# Patient Record
Sex: FEMALE | Race: WHITE | NOT HISPANIC OR LATINO
[De-identification: names, ages, dates, MRNs, and addresses within clinical notes are randomized per-mention and may not be internally consistent; named-entity substitution may affect disease eponyms.]

---

## 2017-02-13 ENCOUNTER — MEDICATION RENEWAL (OUTPATIENT)
Age: 82
End: 2017-02-13

## 2017-02-15 ENCOUNTER — APPOINTMENT (OUTPATIENT)
Dept: COLORECTAL SURGERY | Facility: CLINIC | Age: 82
End: 2017-02-15

## 2017-03-09 ENCOUNTER — OTHER (OUTPATIENT)
Age: 82
End: 2017-03-09

## 2017-03-09 ENCOUNTER — MEDICATION RENEWAL (OUTPATIENT)
Age: 82
End: 2017-03-09

## 2017-03-09 RX ORDER — LIDOCAINE 5 G/100G
5 OINTMENT TOPICAL
Qty: 1 | Refills: 3 | Status: ACTIVE | COMMUNITY
Start: 2017-03-09 | End: 1900-01-01

## 2017-04-05 ENCOUNTER — APPOINTMENT (OUTPATIENT)
Dept: INTERNAL MEDICINE | Facility: CLINIC | Age: 82
End: 2017-04-05

## 2017-04-05 VITALS
OXYGEN SATURATION: 97 % | BODY MASS INDEX: 23.39 KG/M2 | SYSTOLIC BLOOD PRESSURE: 160 MMHG | HEART RATE: 76 BPM | HEIGHT: 63 IN | DIASTOLIC BLOOD PRESSURE: 88 MMHG | WEIGHT: 132 LBS | RESPIRATION RATE: 14 BRPM

## 2017-04-05 LAB
INR PPP: 1.9 RATIO
POCT-PROTHROMBIN TIME: 22.4 SECS
QUALITY CONTROL: YES

## 2017-04-06 LAB
ALBUMIN SERPL ELPH-MCNC: 4.4 G/DL
ALP BLD-CCNC: 132 U/L
ALT SERPL-CCNC: 17 U/L
ANION GAP SERPL CALC-SCNC: 14 MMOL/L
AST SERPL-CCNC: 19 U/L
BASOPHILS # BLD AUTO: 0.03 K/UL
BASOPHILS NFR BLD AUTO: 0.3 %
BILIRUB SERPL-MCNC: 1.2 MG/DL
BUN SERPL-MCNC: 17 MG/DL
CALCIUM SERPL-MCNC: 9.2 MG/DL
CHLORIDE SERPL-SCNC: 102 MMOL/L
CHOLEST SERPL-MCNC: 123 MG/DL
CHOLEST/HDLC SERPL: 2.5 RATIO
CO2 SERPL-SCNC: 26 MMOL/L
CREAT SERPL-MCNC: 0.97 MG/DL
EOSINOPHIL # BLD AUTO: 0.02 K/UL
EOSINOPHIL NFR BLD AUTO: 0.2 %
FERRITIN SERPL-MCNC: 32.8 NG/ML
GLUCOSE SERPL-MCNC: 137 MG/DL
HBA1C MFR BLD HPLC: 6.5 %
HCT VFR BLD CALC: 51.9 %
HDLC SERPL-MCNC: 50 MG/DL
HGB BLD-MCNC: 15.7 G/DL
IMM GRANULOCYTES NFR BLD AUTO: 0.2 %
IRON SATN MFR SERPL: 6 %
IRON SERPL-MCNC: 42 UG/DL
LDLC SERPL CALC-MCNC: 59 MG/DL
LYMPHOCYTES # BLD AUTO: 1.25 K/UL
LYMPHOCYTES NFR BLD AUTO: 14 %
MAN DIFF?: NORMAL
MCHC RBC-ENTMCNC: 29 PG
MCHC RBC-ENTMCNC: 30.3 GM/DL
MCV RBC AUTO: 95.8 FL
MONOCYTES # BLD AUTO: 0.58 K/UL
MONOCYTES NFR BLD AUTO: 6.5 %
NEUTROPHILS # BLD AUTO: 7.01 K/UL
NEUTROPHILS NFR BLD AUTO: 78.8 %
PLATELET # BLD AUTO: 190 K/UL
POTASSIUM SERPL-SCNC: 4.9 MMOL/L
PROT SERPL-MCNC: 6.5 G/DL
RBC # BLD: 5.42 M/UL
RBC # FLD: 14.6 %
SODIUM SERPL-SCNC: 142 MMOL/L
TIBC SERPL-MCNC: 366 UG/DL
TRIGL SERPL-MCNC: 69 MG/DL
TSH SERPL-ACNC: 2.86 UIU/ML
UIBC SERPL-MCNC: 324 UG/DL
WBC # FLD AUTO: 8.91 K/UL

## 2017-04-11 ENCOUNTER — APPOINTMENT (OUTPATIENT)
Dept: COLORECTAL SURGERY | Facility: CLINIC | Age: 82
End: 2017-04-11

## 2017-04-11 VITALS
WEIGHT: 132 LBS | SYSTOLIC BLOOD PRESSURE: 162 MMHG | TEMPERATURE: 97.9 F | HEIGHT: 63 IN | BODY MASS INDEX: 23.39 KG/M2 | HEART RATE: 74 BPM | DIASTOLIC BLOOD PRESSURE: 84 MMHG

## 2017-04-11 RX ORDER — LIDOCAINE HYDROCHLORIDE 20 MG/ML
2 JELLY TOPICAL
Qty: 1 | Refills: 5 | Status: ACTIVE | COMMUNITY
Start: 2017-04-11 | End: 1900-01-01

## 2017-04-11 RX ORDER — HYDROCORTISONE 25 MG/G
2.5 CREAM TOPICAL
Qty: 1 | Refills: 5 | Status: ACTIVE | COMMUNITY
Start: 2017-04-11 | End: 1900-01-01

## 2017-04-14 LAB
VZV AB TITR SER: POSITIVE
VZV IGG SER IF-ACNC: 2166 INDEX

## 2017-04-15 ENCOUNTER — OTHER (OUTPATIENT)
Age: 82
End: 2017-04-15

## 2017-04-15 RX ORDER — NITROGLYCERIN 4 MG/G
0.4 OINTMENT RECTAL TWICE DAILY
Qty: 30 | Refills: 0 | Status: ACTIVE | COMMUNITY
Start: 2017-04-15 | End: 1900-01-01

## 2017-04-17 ENCOUNTER — MEDICATION RENEWAL (OUTPATIENT)
Age: 82
End: 2017-04-17

## 2017-05-12 ENCOUNTER — MEDICATION RENEWAL (OUTPATIENT)
Age: 82
End: 2017-05-12

## 2017-07-19 ENCOUNTER — APPOINTMENT (OUTPATIENT)
Dept: COLORECTAL SURGERY | Facility: CLINIC | Age: 82
End: 2017-07-19

## 2017-07-19 VITALS
HEIGHT: 63 IN | DIASTOLIC BLOOD PRESSURE: 64 MMHG | BODY MASS INDEX: 23.39 KG/M2 | WEIGHT: 132 LBS | SYSTOLIC BLOOD PRESSURE: 110 MMHG | TEMPERATURE: 98.1 F | HEART RATE: 72 BPM

## 2017-07-19 DIAGNOSIS — K64.8 OTHER HEMORRHOIDS: ICD-10-CM

## 2017-07-28 ENCOUNTER — APPOINTMENT (OUTPATIENT)
Dept: INTERNAL MEDICINE | Facility: CLINIC | Age: 82
End: 2017-07-28
Payer: MEDICARE

## 2017-07-28 VITALS
DIASTOLIC BLOOD PRESSURE: 72 MMHG | HEART RATE: 87 BPM | SYSTOLIC BLOOD PRESSURE: 118 MMHG | HEIGHT: 63 IN | BODY MASS INDEX: 23.57 KG/M2 | RESPIRATION RATE: 14 BRPM | WEIGHT: 133 LBS | TEMPERATURE: 98.2 F | OXYGEN SATURATION: 97 %

## 2017-07-28 LAB
INR PPP: 2.6 RATIO
POCT-PROTHROMBIN TIME: 30.7 SECS
QUALITY CONTROL: YES

## 2017-07-28 PROCEDURE — 99214 OFFICE O/P EST MOD 30 MIN: CPT

## 2017-07-31 ENCOUNTER — OUTPATIENT (OUTPATIENT)
Dept: OUTPATIENT SERVICES | Facility: HOSPITAL | Age: 82
LOS: 1 days | Discharge: HOME | End: 2017-07-31
Payer: MEDICARE

## 2017-07-31 VITALS
HEART RATE: 76 BPM | SYSTOLIC BLOOD PRESSURE: 170 MMHG | RESPIRATION RATE: 20 BRPM | WEIGHT: 132.06 LBS | HEIGHT: 63 IN | TEMPERATURE: 97 F | OXYGEN SATURATION: 99 % | DIASTOLIC BLOOD PRESSURE: 90 MMHG

## 2017-07-31 DIAGNOSIS — Z96.7 PRESENCE OF OTHER BONE AND TENDON IMPLANTS: Chronic | ICD-10-CM

## 2017-07-31 DIAGNOSIS — Z98.49 CATARACT EXTRACTION STATUS, UNSPECIFIED EYE: Chronic | ICD-10-CM

## 2017-07-31 DIAGNOSIS — K60.2 ANAL FISSURE, UNSPECIFIED: ICD-10-CM

## 2017-07-31 LAB
ABO RH CONFIRMATION: SIGNIFICANT CHANGE UP
ANION GAP SERPL CALC-SCNC: 6 MMOL/L — SIGNIFICANT CHANGE UP (ref 5–17)
BASOPHILS # BLD AUTO: 0.1 K/UL — SIGNIFICANT CHANGE UP (ref 0–0.2)
BASOPHILS NFR BLD AUTO: 1.1 % — SIGNIFICANT CHANGE UP (ref 0–2)
BLD GP AB SCN SERPL QL: SIGNIFICANT CHANGE UP
BUN SERPL-MCNC: 22 MG/DL — SIGNIFICANT CHANGE UP (ref 7–23)
CALCIUM SERPL-MCNC: 9.4 MG/DL — SIGNIFICANT CHANGE UP (ref 8.5–10.1)
CHLORIDE SERPL-SCNC: 104 MMOL/L — SIGNIFICANT CHANGE UP (ref 96–108)
CO2 SERPL-SCNC: 28 MMOL/L — SIGNIFICANT CHANGE UP (ref 22–31)
CREAT SERPL-MCNC: 1.13 MG/DL — SIGNIFICANT CHANGE UP (ref 0.5–1.3)
EOSINOPHIL # BLD AUTO: 0 K/UL — SIGNIFICANT CHANGE UP (ref 0–0.5)
EOSINOPHIL NFR BLD AUTO: 0.3 % — SIGNIFICANT CHANGE UP (ref 0–6)
GLUCOSE SERPL-MCNC: 305 MG/DL — HIGH (ref 70–99)
HCT VFR BLD CALC: 51.9 % — HIGH (ref 34.5–45)
HGB BLD-MCNC: 15.8 G/DL — HIGH (ref 11.5–15.5)
LYMPHOCYTES # BLD AUTO: 1 K/UL — SIGNIFICANT CHANGE UP (ref 1–3.3)
LYMPHOCYTES # BLD AUTO: 9.7 % — LOW (ref 13–44)
MCHC RBC-ENTMCNC: 27.3 PG — SIGNIFICANT CHANGE UP (ref 27–34)
MCHC RBC-ENTMCNC: 30.5 GM/DL — LOW (ref 32–36)
MCV RBC AUTO: 89.7 FL — SIGNIFICANT CHANGE UP (ref 80–100)
MONOCYTES # BLD AUTO: 0.7 K/UL — SIGNIFICANT CHANGE UP (ref 0–0.9)
MONOCYTES NFR BLD AUTO: 6.7 % — SIGNIFICANT CHANGE UP (ref 2–14)
NEUTROPHILS # BLD AUTO: 8.4 K/UL — HIGH (ref 1.8–7.4)
NEUTROPHILS NFR BLD AUTO: 82.2 % — HIGH (ref 43–77)
PLATELET # BLD AUTO: 210 K/UL — SIGNIFICANT CHANGE UP (ref 150–400)
POTASSIUM SERPL-MCNC: 4.4 MMOL/L — SIGNIFICANT CHANGE UP (ref 3.5–5.3)
POTASSIUM SERPL-SCNC: 4.4 MMOL/L — SIGNIFICANT CHANGE UP (ref 3.5–5.3)
RBC # BLD: 5.78 M/UL — HIGH (ref 3.8–5.2)
RBC # FLD: 16.2 % — HIGH (ref 10.3–14.5)
SODIUM SERPL-SCNC: 138 MMOL/L — SIGNIFICANT CHANGE UP (ref 135–145)
TYPE + AB SCN PNL BLD: SIGNIFICANT CHANGE UP
WBC # BLD: 10.3 K/UL — SIGNIFICANT CHANGE UP (ref 3.8–10.5)
WBC # FLD AUTO: 10.3 K/UL — SIGNIFICANT CHANGE UP (ref 3.8–10.5)

## 2017-07-31 PROCEDURE — 93010 ELECTROCARDIOGRAM REPORT: CPT

## 2017-07-31 NOTE — H&P PST ADULT - ASSESSMENT
88 y/o female with hemorrhoids, anal fissure. Scheduled for hemorrhoidectomy, fissurectomy, sphincterotomy, proctoplasty with Dr. Quesada.    Plan  1. Stop all NSAIDS, herbal supplements and vitamins for 7 days.  2. NPO at midnight.  3. Hold coumadin and aspirin as instructed by surgeon and PMD.

## 2017-07-31 NOTE — H&P PST ADULT - NSANTHOSAYNRD_GEN_A_CORE
No. BAO screening performed.  STOP BANG Legend: 0-2 = LOW Risk; 3-4 = INTERMEDIATE Risk; 5-8 = HIGH Risk

## 2017-07-31 NOTE — H&P PST ADULT - PMH
Atrial fibrillation    Elevated red blood cell count    Hearing loss    HTN (hypertension)    Hypercholesterolemia    Myocardial infarction  8 yrs ago  Skin cancer  of back - removed  Type 2 diabetes mellitus Anal fissure    Atrial fibrillation    Elevated red blood cell count    Hearing loss    HTN (hypertension)    Hypercholesterolemia    Myocardial infarction  8 yrs ago  Skin cancer  of back - removed  Type 2 diabetes mellitus

## 2017-07-31 NOTE — H&P PST ADULT - PSH
History of cataract surgery  03/2016,  06/2016  S/P ORIF (open reduction internal fixation) fracture  right hip pinning  - 5 yrs ago

## 2017-07-31 NOTE — H&P PST ADULT - HISTORY OF PRESENT ILLNESS
88 y/o female with hemorrhoids, anal fissure. Complain of rectal pain. Here today for PST for hemorrhoidectomy, fissurectomy, sphincterotomy, proctoplasty.

## 2017-07-31 NOTE — H&P PST ADULT - FAMILY HISTORY
Mother  Still living? No  Family history of cancer, Age at diagnosis: Age Unknown     Father  Still living? No  Family history of heart failure, Age at diagnosis: Age Unknown     Sibling  Still living? No  Family history of cancer, Age at diagnosis: Age Unknown

## 2017-08-03 ENCOUNTER — RESULT REVIEW (OUTPATIENT)
Age: 82
End: 2017-08-03

## 2017-08-03 ENCOUNTER — APPOINTMENT (OUTPATIENT)
Dept: COLORECTAL SURGERY | Facility: HOSPITAL | Age: 82
End: 2017-08-03
Payer: MEDICARE

## 2017-08-03 ENCOUNTER — OUTPATIENT (OUTPATIENT)
Dept: OUTPATIENT SERVICES | Facility: HOSPITAL | Age: 82
LOS: 1 days | Discharge: HOME | End: 2017-08-03
Payer: MEDICARE

## 2017-08-03 VITALS
HEART RATE: 97 BPM | SYSTOLIC BLOOD PRESSURE: 132 MMHG | RESPIRATION RATE: 18 BRPM | OXYGEN SATURATION: 98 % | DIASTOLIC BLOOD PRESSURE: 73 MMHG | TEMPERATURE: 98 F

## 2017-08-03 VITALS
TEMPERATURE: 97 F | HEART RATE: 89 BPM | SYSTOLIC BLOOD PRESSURE: 147 MMHG | OXYGEN SATURATION: 100 % | RESPIRATION RATE: 17 BRPM | HEIGHT: 63 IN | WEIGHT: 133.38 LBS | DIASTOLIC BLOOD PRESSURE: 68 MMHG

## 2017-08-03 DIAGNOSIS — Z96.7 PRESENCE OF OTHER BONE AND TENDON IMPLANTS: Chronic | ICD-10-CM

## 2017-08-03 DIAGNOSIS — Z98.49 CATARACT EXTRACTION STATUS, UNSPECIFIED EYE: Chronic | ICD-10-CM

## 2017-08-03 LAB
APTT BLD: 43 SEC — HIGH (ref 27.5–37.4)
GLUCOSE SERPL-MCNC: 154 MG/DL — HIGH (ref 70–99)
INR BLD: 1.29 RATIO — HIGH (ref 0.88–1.16)
PROTHROM AB SERPL-ACNC: 14 SEC — HIGH (ref 9.8–12.7)

## 2017-08-03 PROCEDURE — 45990 SURG DX EXAM ANORECTAL: CPT | Mod: 59

## 2017-08-03 PROCEDURE — 46260 REMOVE IN/EX HEM GROUPS 2+: CPT

## 2017-08-03 PROCEDURE — 88304 TISSUE EXAM BY PATHOLOGIST: CPT | Mod: 26

## 2017-08-03 PROCEDURE — 46200 REMOVAL OF ANAL FISSURE: CPT | Mod: 59

## 2017-08-03 RX ORDER — OXYCODONE HYDROCHLORIDE 5 MG/1
1 TABLET ORAL
Qty: 20 | Refills: 0
Start: 2017-08-03

## 2017-08-03 RX ORDER — ONDANSETRON 8 MG/1
4 TABLET, FILM COATED ORAL ONCE
Qty: 0 | Refills: 0 | Status: DISCONTINUED | OUTPATIENT
Start: 2017-08-03 | End: 2017-08-03

## 2017-08-03 RX ORDER — FENTANYL CITRATE 50 UG/ML
50 INJECTION INTRAVENOUS
Qty: 0 | Refills: 0 | Status: DISCONTINUED | OUTPATIENT
Start: 2017-08-03 | End: 2017-08-03

## 2017-08-03 RX ORDER — OXYCODONE HYDROCHLORIDE 5 MG/1
10 TABLET ORAL EVERY 6 HOURS
Qty: 0 | Refills: 0 | Status: DISCONTINUED | OUTPATIENT
Start: 2017-08-03 | End: 2017-08-03

## 2017-08-03 RX ORDER — HYDRALAZINE HCL 50 MG
5 TABLET ORAL
Qty: 0 | Refills: 0 | Status: DISCONTINUED | OUTPATIENT
Start: 2017-08-03 | End: 2017-08-18

## 2017-08-03 RX ORDER — SODIUM CHLORIDE 9 MG/ML
1000 INJECTION INTRAMUSCULAR; INTRAVENOUS; SUBCUTANEOUS
Qty: 0 | Refills: 0 | Status: DISCONTINUED | OUTPATIENT
Start: 2017-08-03 | End: 2017-08-18

## 2017-08-03 RX ORDER — SODIUM CHLORIDE 9 MG/ML
3 INJECTION INTRAMUSCULAR; INTRAVENOUS; SUBCUTANEOUS EVERY 8 HOURS
Qty: 0 | Refills: 0 | Status: DISCONTINUED | OUTPATIENT
Start: 2017-08-03 | End: 2017-08-03

## 2017-08-03 RX ADMIN — SODIUM CHLORIDE 75 MILLILITER(S): 9 INJECTION INTRAMUSCULAR; INTRAVENOUS; SUBCUTANEOUS at 11:51

## 2017-08-03 RX ADMIN — Medication 5 MILLIGRAM(S): at 11:12

## 2017-08-03 RX ADMIN — Medication 5 MILLIGRAM(S): at 11:01

## 2017-08-03 NOTE — ASU DISCHARGE PLAN (ADULT/PEDIATRIC). - MEDICATION SUMMARY - MEDICATIONS TO TAKE
I will START or STAY ON the medications listed below when I get home from the hospital:    metamucil 1 teaspoon  --   once a day  -- Indication: For home med    aspirin 81 mg oral tablet  -- 1 tab(s) by mouth once a day    hold/last dose 07/27/2017  -- Indication: For home med    acetaminophen-oxycodone 325 mg-5 mg oral tablet  -- 1 tab(s) by mouth every 6 hours, As Needed -for moderate pain [id number]  -- Caution federal law prohibits the transfer of this drug to any person other  than the person for whom it was prescribed.  May cause drowsiness.  Alcohol may intensify this effect.  Use care when operating dangerous machinery.  This prescription cannot be refilled.  This product contains acetaminophen.  Do not use  with any other product containing acetaminophen to prevent possible liver damage.  Using more of this medication than prescribed may cause serious breathing problems.    -- Indication: For pain    ramipril 5 mg oral capsule  -- 1 cap(s) by mouth once a day  -- Indication: For home med    warfarin 2 mg oral tablet  -- 1 tab(s) by mouth once a day    Mon - Wed - Fri    last dose 07/27/2017  -- Indication: For home med    warfarin 3 mg oral tablet  -- 1 tab(s) by mouth 4 times a week    Tues- Thurs- Sat  - Sun    last dose 07/27/2017  -- Indication: For home med    glimepiride 1 mg oral tablet  -- 1 tab(s) by mouth once a day  -- Indication: For home med    atorvastatin 40 mg oral tablet  -- 1 tab(s) by mouth once a day  -- Indication: For home med    hydroxyurea 500 mg oral capsule  --  by mouth once a day  -- Indication: For home med    Colace 100 mg oral capsule  -- 1 cap(s) by mouth once a day  -- Indication: For home med    lansoprazole 30 mg oral delayed release capsule  -- 1 cap(s) by mouth once a day  -- Indication: For home med    Vitamin D3 1000 intl units oral capsule  -- 1 cap(s) by mouth once a day  -- Indication: For home med

## 2017-08-03 NOTE — ASU PATIENT PROFILE, ADULT - PMH
Anal fissure    Atrial fibrillation    Elevated red blood cell count    Hearing loss    HTN (hypertension)    Hypercholesterolemia    Myocardial infarction  8 yrs ago  Skin cancer  of back - removed  Type 2 diabetes mellitus

## 2017-08-03 NOTE — BRIEF OPERATIVE NOTE - PROCEDURE
Hemorrhoidectomy, in prone position  08/03/2017    Active  MBERRONESJR  Fissurectomy, anal, including sphincterotomy  08/03/2017    Active  MBERRONESJR

## 2017-08-03 NOTE — CHART NOTE - NSCHARTNOTEFT_GEN_A_CORE
Patient seen and evaluated in PACU for rectal bleeding following an EUA, LIS, fissurectomy, hemorrhoidectomy.  Nurses in PACU called about rectal bleeding  VS: 161/70 86 99%  NAD  rectal exam revealed bleeding at sphincterotomy site.  a vicryl suture applied and bleeding stopped, gauze applied.  Pt stable, will observe in PACU. if stable dc home today.    Pt seen and examined at bedside and procedure performed with Dr. Quesada

## 2017-08-03 NOTE — BRIEF OPERATIVE NOTE - POST-OP DX
Anal fissure  08/03/2017    Active  Gildardo Nicole  Anal pain  08/03/2017    Active  Gildardo Nicole  Hemorrhoids, unspecified hemorrhoid type  08/03/2017    Active  Gildardo Nicole

## 2017-08-03 NOTE — ASU DISCHARGE PLAN (ADULT/PEDIATRIC). - NOTIFY
Bleeding that does not stop/Fever greater than 101/Pain not relieved by Medications/Persistent Nausea and Vomiting/Excessive Diarrhea

## 2017-08-03 NOTE — ASU DISCHARGE PLAN (ADULT/PEDIATRIC). - SPECIAL INSTRUCTIONS
May apply ice packs to area. RESUME COUMADIN ON 8/6/17. PLEASE TAKE MILK OF MAGNESIA AND COLACE FOR CONSTIPATION.

## 2017-08-04 LAB — SURGICAL PATHOLOGY FINAL REPORT - CH: SIGNIFICANT CHANGE UP

## 2017-08-09 DIAGNOSIS — E11.9 TYPE 2 DIABETES MELLITUS WITHOUT COMPLICATIONS: ICD-10-CM

## 2017-08-09 DIAGNOSIS — I48.91 UNSPECIFIED ATRIAL FIBRILLATION: ICD-10-CM

## 2017-08-09 DIAGNOSIS — I10 ESSENTIAL (PRIMARY) HYPERTENSION: ICD-10-CM

## 2017-08-09 DIAGNOSIS — K60.2 ANAL FISSURE, UNSPECIFIED: ICD-10-CM

## 2017-08-09 DIAGNOSIS — I25.2 OLD MYOCARDIAL INFARCTION: ICD-10-CM

## 2017-08-09 DIAGNOSIS — K64.8 OTHER HEMORRHOIDS: ICD-10-CM

## 2017-08-09 DIAGNOSIS — Z79.01 LONG TERM (CURRENT) USE OF ANTICOAGULANTS: ICD-10-CM

## 2017-08-09 DIAGNOSIS — Z79.82 LONG TERM (CURRENT) USE OF ASPIRIN: ICD-10-CM

## 2017-08-09 DIAGNOSIS — J44.9 CHRONIC OBSTRUCTIVE PULMONARY DISEASE, UNSPECIFIED: ICD-10-CM

## 2017-08-09 DIAGNOSIS — I25.10 ATHEROSCLEROTIC HEART DISEASE OF NATIVE CORONARY ARTERY WITHOUT ANGINA PECTORIS: ICD-10-CM

## 2017-08-14 ENCOUNTER — RX RENEWAL (OUTPATIENT)
Age: 82
End: 2017-08-14

## 2017-08-21 ENCOUNTER — OTHER (OUTPATIENT)
Age: 82
End: 2017-08-21

## 2017-09-05 ENCOUNTER — RX RENEWAL (OUTPATIENT)
Age: 82
End: 2017-09-05

## 2017-09-06 ENCOUNTER — APPOINTMENT (OUTPATIENT)
Dept: COLORECTAL SURGERY | Facility: CLINIC | Age: 82
End: 2017-09-06
Payer: MEDICARE

## 2017-09-06 VITALS
HEIGHT: 63 IN | WEIGHT: 134 LBS | TEMPERATURE: 98.1 F | SYSTOLIC BLOOD PRESSURE: 124 MMHG | BODY MASS INDEX: 23.74 KG/M2 | DIASTOLIC BLOOD PRESSURE: 74 MMHG | HEART RATE: 84 BPM

## 2017-09-06 DIAGNOSIS — K64.4 RESIDUAL HEMORRHOIDAL SKIN TAGS: ICD-10-CM

## 2017-09-06 PROCEDURE — 99024 POSTOP FOLLOW-UP VISIT: CPT

## 2017-09-07 ENCOUNTER — MEDICATION RENEWAL (OUTPATIENT)
Age: 82
End: 2017-09-07

## 2017-09-12 ENCOUNTER — RX RENEWAL (OUTPATIENT)
Age: 82
End: 2017-09-12

## 2017-09-20 ENCOUNTER — APPOINTMENT (OUTPATIENT)
Dept: CARDIOLOGY | Facility: CLINIC | Age: 82
End: 2017-09-20
Payer: MEDICARE

## 2017-09-20 ENCOUNTER — NON-APPOINTMENT (OUTPATIENT)
Age: 82
End: 2017-09-20

## 2017-09-20 VITALS
BODY MASS INDEX: 23.39 KG/M2 | OXYGEN SATURATION: 95 % | SYSTOLIC BLOOD PRESSURE: 178 MMHG | DIASTOLIC BLOOD PRESSURE: 82 MMHG | HEIGHT: 63 IN | WEIGHT: 132 LBS | HEART RATE: 80 BPM

## 2017-09-20 VITALS — DIASTOLIC BLOOD PRESSURE: 80 MMHG | SYSTOLIC BLOOD PRESSURE: 140 MMHG

## 2017-09-20 LAB
INR PPP: 2 RATIO
QUALITY CONTROL: YES

## 2017-09-20 PROCEDURE — 93000 ELECTROCARDIOGRAM COMPLETE: CPT

## 2017-09-20 PROCEDURE — 85610 PROTHROMBIN TIME: CPT | Mod: QW

## 2017-09-20 PROCEDURE — 99215 OFFICE O/P EST HI 40 MIN: CPT

## 2017-10-03 VITALS — BODY MASS INDEX: 23.39 KG/M2 | WEIGHT: 132 LBS | HEIGHT: 63 IN | HEART RATE: 80 BPM

## 2017-10-04 ENCOUNTER — APPOINTMENT (OUTPATIENT)
Dept: COLORECTAL SURGERY | Facility: CLINIC | Age: 82
End: 2017-10-04
Payer: MEDICARE

## 2017-10-04 DIAGNOSIS — K60.2 ANAL FISSURE, UNSPECIFIED: ICD-10-CM

## 2017-10-04 PROCEDURE — 99024 POSTOP FOLLOW-UP VISIT: CPT

## 2017-10-05 PROBLEM — K60.2 ANAL FISSURE: Status: ACTIVE | Noted: 2017-04-14

## 2017-11-10 ENCOUNTER — RX RENEWAL (OUTPATIENT)
Age: 82
End: 2017-11-10

## 2017-12-27 ENCOUNTER — APPOINTMENT (OUTPATIENT)
Dept: INTERNAL MEDICINE | Facility: CLINIC | Age: 82
End: 2017-12-27
Payer: MEDICARE

## 2017-12-27 VITALS
BODY MASS INDEX: 23.57 KG/M2 | DIASTOLIC BLOOD PRESSURE: 64 MMHG | TEMPERATURE: 97.6 F | HEART RATE: 98 BPM | HEIGHT: 63 IN | RESPIRATION RATE: 14 BRPM | OXYGEN SATURATION: 97 % | SYSTOLIC BLOOD PRESSURE: 120 MMHG | WEIGHT: 133 LBS

## 2017-12-27 PROCEDURE — 99214 OFFICE O/P EST MOD 30 MIN: CPT | Mod: 25

## 2017-12-27 PROCEDURE — 90686 IIV4 VACC NO PRSV 0.5 ML IM: CPT

## 2017-12-27 PROCEDURE — G0008: CPT

## 2018-01-05 ENCOUNTER — APPOINTMENT (OUTPATIENT)
Dept: COLORECTAL SURGERY | Facility: CLINIC | Age: 83
End: 2018-01-05
Payer: MEDICARE

## 2018-01-05 VITALS
DIASTOLIC BLOOD PRESSURE: 78 MMHG | WEIGHT: 133 LBS | HEIGHT: 63 IN | TEMPERATURE: 98.1 F | SYSTOLIC BLOOD PRESSURE: 132 MMHG | BODY MASS INDEX: 23.57 KG/M2 | HEART RATE: 96 BPM

## 2018-01-05 DIAGNOSIS — K59.09 OTHER CONSTIPATION: ICD-10-CM

## 2018-01-05 PROCEDURE — 99214 OFFICE O/P EST MOD 30 MIN: CPT | Mod: 25

## 2018-01-05 PROCEDURE — 46600 DIAGNOSTIC ANOSCOPY SPX: CPT

## 2018-01-08 ENCOUNTER — MEDICATION RENEWAL (OUTPATIENT)
Age: 83
End: 2018-01-08

## 2018-01-08 LAB
INR PPP: 2.5 RATIO
POCT-PROTHROMBIN TIME: 30.1 SECS
QUALITY CONTROL: YES

## 2018-01-09 ENCOUNTER — RX RENEWAL (OUTPATIENT)
Age: 83
End: 2018-01-09

## 2018-02-07 ENCOUNTER — RX RENEWAL (OUTPATIENT)
Age: 83
End: 2018-02-07

## 2018-05-01 ENCOUNTER — RX RENEWAL (OUTPATIENT)
Age: 83
End: 2018-05-01

## 2018-05-06 ENCOUNTER — RX RENEWAL (OUTPATIENT)
Age: 83
End: 2018-05-06

## 2018-05-21 ENCOUNTER — RX RENEWAL (OUTPATIENT)
Age: 83
End: 2018-05-21

## 2018-06-18 ENCOUNTER — APPOINTMENT (OUTPATIENT)
Dept: INTERNAL MEDICINE | Facility: CLINIC | Age: 83
End: 2018-06-18
Payer: MEDICARE

## 2018-06-18 VITALS
HEART RATE: 84 BPM | RESPIRATION RATE: 14 BRPM | TEMPERATURE: 98.3 F | HEIGHT: 63 IN | SYSTOLIC BLOOD PRESSURE: 144 MMHG | DIASTOLIC BLOOD PRESSURE: 72 MMHG | WEIGHT: 133 LBS | BODY MASS INDEX: 23.57 KG/M2 | OXYGEN SATURATION: 98 %

## 2018-06-18 PROCEDURE — 99214 OFFICE O/P EST MOD 30 MIN: CPT

## 2018-06-18 NOTE — HISTORY OF PRESENT ILLNESS
[Other: _____] : [unfilled] [FreeTextEntry1] : left eye pain [de-identified] : Pt c/o waking up with pain around left eye with redness. No blurred vision.\par \par Her anal area is much better since most recent procedure for her hemorrhoids.

## 2018-06-18 NOTE — PHYSICAL EXAM
[No Acute Distress] : no acute distress [Well Nourished] : well nourished [Well Developed] : well developed [Normal Sclera/Conjunctiva] : normal sclera/conjunctiva [PERRL] : pupils equal round and reactive to light [EOMI] : extraocular movements intact [No Respiratory Distress] : no respiratory distress  [Clear to Auscultation] : lungs were clear to auscultation bilaterally [Normal Rate] : normal rate  [Regular Rhythm] : with a regular rhythm [No Edema] : there was no peripheral edema [Soft] : abdomen soft [Non Tender] : non-tender [Normal Gait] : normal gait [Coordination Grossly Intact] : coordination grossly intact [Normal Affect] : the affect was normal [Normal Insight/Judgement] : insight and judgment were intact [de-identified] : erythema under left eye with soft edema upper cheek

## 2018-06-26 LAB
INR PPP: 2.6 RATIO
POCT-PROTHROMBIN TIME: 31.2 SECS
QUALITY CONTROL: YES

## 2018-07-16 ENCOUNTER — RX RENEWAL (OUTPATIENT)
Age: 83
End: 2018-07-16

## 2018-08-14 ENCOUNTER — RX RENEWAL (OUTPATIENT)
Age: 83
End: 2018-08-14

## 2018-08-28 PROBLEM — I21.3 ST ELEVATION (STEMI) MYOCARDIAL INFARCTION OF UNSPECIFIED SITE: Chronic | Status: ACTIVE | Noted: 2017-07-31

## 2018-08-28 PROBLEM — H91.90 UNSPECIFIED HEARING LOSS, UNSPECIFIED EAR: Chronic | Status: ACTIVE | Noted: 2017-07-31

## 2018-08-28 PROBLEM — R71.8 OTHER ABNORMALITY OF RED BLOOD CELLS: Chronic | Status: ACTIVE | Noted: 2017-07-31

## 2018-08-28 PROBLEM — C44.90 UNSPECIFIED MALIGNANT NEOPLASM OF SKIN, UNSPECIFIED: Chronic | Status: ACTIVE | Noted: 2017-07-31

## 2018-08-28 PROBLEM — I10 ESSENTIAL (PRIMARY) HYPERTENSION: Chronic | Status: ACTIVE | Noted: 2017-07-31

## 2018-08-28 PROBLEM — E78.00 PURE HYPERCHOLESTEROLEMIA, UNSPECIFIED: Chronic | Status: ACTIVE | Noted: 2017-07-31

## 2018-08-28 PROBLEM — K60.2 ANAL FISSURE, UNSPECIFIED: Chronic | Status: ACTIVE | Noted: 2017-07-31

## 2018-08-30 ENCOUNTER — APPOINTMENT (OUTPATIENT)
Dept: INTERNAL MEDICINE | Facility: CLINIC | Age: 83
End: 2018-08-30
Payer: MEDICARE

## 2018-08-30 ENCOUNTER — RX RENEWAL (OUTPATIENT)
Age: 83
End: 2018-08-30

## 2018-08-30 VITALS
SYSTOLIC BLOOD PRESSURE: 130 MMHG | BODY MASS INDEX: 22.68 KG/M2 | TEMPERATURE: 98.1 F | HEIGHT: 63 IN | HEART RATE: 88 BPM | RESPIRATION RATE: 14 BRPM | OXYGEN SATURATION: 98 % | WEIGHT: 128 LBS | DIASTOLIC BLOOD PRESSURE: 64 MMHG

## 2018-08-30 DIAGNOSIS — H00.011 HORDEOLUM EXTERNUM RIGHT UPPER EYELID: ICD-10-CM

## 2018-08-30 PROCEDURE — 99214 OFFICE O/P EST MOD 30 MIN: CPT

## 2018-08-30 NOTE — HISTORY OF PRESENT ILLNESS
[FreeTextEntry8] : CC: eyelid swelling\par \par c/o right upper lid with edema. no pain or itch. has been like this for one week. \par \par Had it on left and went away with antibiotics. \par \par Has DM and glucose has been high.\par \par c/o fatigue.  [Other: _____] : [unfilled]

## 2018-08-30 NOTE — PHYSICAL EXAM
[No Acute Distress] : no acute distress [Well Nourished] : well nourished [Well Developed] : well developed [Well-Appearing] : well-appearing [Normal Sclera/Conjunctiva] : normal sclera/conjunctiva [PERRL] : pupils equal round and reactive to light [EOMI] : extraocular movements intact [No Respiratory Distress] : no respiratory distress  [Clear to Auscultation] : lungs were clear to auscultation bilaterally [Normal Rate] : normal rate  [Regular Rhythm] : with a regular rhythm [No Edema] : there was no peripheral edema [Soft] : abdomen soft [Non Tender] : non-tender [Normal Affect] : the affect was normal [Normal Insight/Judgement] : insight and judgment were intact [de-identified] : right upper lid with edema, nontender, erythema of lid.

## 2018-09-07 ENCOUNTER — RX RENEWAL (OUTPATIENT)
Age: 83
End: 2018-09-07

## 2018-09-12 LAB
INR PPP: 4.3 RATIO
POCT-PROTHROMBIN TIME: 51 SECS
QUALITY CONTROL: YES

## 2018-09-26 ENCOUNTER — RX RENEWAL (OUTPATIENT)
Age: 83
End: 2018-09-26

## 2018-10-24 ENCOUNTER — RX RENEWAL (OUTPATIENT)
Age: 83
End: 2018-10-24

## 2018-10-24 ENCOUNTER — MEDICATION RENEWAL (OUTPATIENT)
Age: 83
End: 2018-10-24

## 2018-10-25 NOTE — ASU PATIENT PROFILE, ADULT - NS PRO AD PATIENT TYPE ON CHART
Patient has abnormal cell on her pap smear that are not cancerous and likely related to her being post-menopausal and having a vaginal area that is just a little bit drier than is used to be. Dr. Fierro tested for virus called HPV that can cause cervical cancer and this was negative. I would not worry about this, but should repeat pap in one year or sooner if bleeding, pelvic pain or other issues. Health Care Proxy (HCP)

## 2018-10-29 ENCOUNTER — APPOINTMENT (OUTPATIENT)
Dept: INTERNAL MEDICINE | Facility: CLINIC | Age: 83
End: 2018-10-29
Payer: MEDICARE

## 2018-10-29 ENCOUNTER — MEDICATION RENEWAL (OUTPATIENT)
Age: 83
End: 2018-10-29

## 2018-10-29 VITALS
TEMPERATURE: 97.4 F | SYSTOLIC BLOOD PRESSURE: 130 MMHG | DIASTOLIC BLOOD PRESSURE: 70 MMHG | RESPIRATION RATE: 14 BRPM | HEIGHT: 63 IN | OXYGEN SATURATION: 95 % | HEART RATE: 78 BPM | BODY MASS INDEX: 22.68 KG/M2 | WEIGHT: 128 LBS

## 2018-10-29 LAB
INR PPP: 3.2 RATIO
POCT-PROTHROMBIN TIME: 39 SECS
QUALITY CONTROL: YES

## 2018-10-29 PROCEDURE — 99214 OFFICE O/P EST MOD 30 MIN: CPT

## 2018-10-29 NOTE — REVIEW OF SYSTEMS
[Fatigue] : fatigue [Chest Pain] : chest pain [Palpitations] : palpitations [Shortness Of Breath] : shortness of breath [Cough] : cough [Dyspnea on Exertion] : dyspnea on exertion [Negative] : Heme/Lymph

## 2018-10-29 NOTE — PHYSICAL EXAM
[Well Nourished] : well nourished [Well Developed] : well developed [Normal TMs] : both tympanic membranes were normal [No Respiratory Distress] : no respiratory distress  [Clear to Auscultation] : lungs were clear to auscultation bilaterally [Normal Rate] : normal rate  [Regular Rhythm] : with a regular rhythm [No Edema] : there was no peripheral edema [Soft] : abdomen soft [Non Tender] : non-tender [Normal Affect] : the affect was normal [Normal Insight/Judgement] : insight and judgment were intact [de-identified] : mild dyspnea

## 2018-10-29 NOTE — HISTORY OF PRESENT ILLNESS
[Other: _____] : [unfilled] [FreeTextEntry1] : sob [de-identified] : Pt c/o trouble breathing on and off for a week or so. Sometimes she is fine. Does not sleep well, but not new. She does fall asleep during the day. Always sleeps with 3 pillows. No pain. Coughs which is not new. Has a lot of mucus in her throat.

## 2018-10-30 LAB
ALBUMIN SERPL ELPH-MCNC: 4.4 G/DL
ALP BLD-CCNC: 133 U/L
ALT SERPL-CCNC: 7 U/L
ANION GAP SERPL CALC-SCNC: 14 MMOL/L
AST SERPL-CCNC: 17 U/L
BILIRUB SERPL-MCNC: 0.8 MG/DL
BUN SERPL-MCNC: 17 MG/DL
CALCIUM SERPL-MCNC: 9.7 MG/DL
CHLORIDE SERPL-SCNC: 103 MMOL/L
CHOLEST SERPL-MCNC: 101 MG/DL
CHOLEST/HDLC SERPL: 3.2 RATIO
CO2 SERPL-SCNC: 23 MMOL/L
CREAT SERPL-MCNC: 0.9 MG/DL
CREAT SPEC-SCNC: 120 MG/DL
GLUCOSE SERPL-MCNC: 256 MG/DL
HBA1C MFR BLD HPLC: 7.9 %
HDLC SERPL-MCNC: 32 MG/DL
LDLC SERPL CALC-MCNC: 52 MG/DL
MICROALBUMIN 24H UR DL<=1MG/L-MCNC: 16.2 MG/DL
MICROALBUMIN/CREAT 24H UR-RTO: 135 MG/G
POTASSIUM SERPL-SCNC: 4.9 MMOL/L
PROT SERPL-MCNC: 6.8 G/DL
SODIUM SERPL-SCNC: 140 MMOL/L
TRIGL SERPL-MCNC: 85 MG/DL

## 2018-11-20 ENCOUNTER — FORM ENCOUNTER (OUTPATIENT)
Age: 83
End: 2018-11-20

## 2018-11-21 ENCOUNTER — APPOINTMENT (OUTPATIENT)
Dept: RADIOLOGY | Facility: CLINIC | Age: 83
End: 2018-11-21

## 2018-11-21 ENCOUNTER — OUTPATIENT (OUTPATIENT)
Dept: OUTPATIENT SERVICES | Facility: HOSPITAL | Age: 83
LOS: 1 days | End: 2018-11-21
Payer: COMMERCIAL

## 2018-11-21 DIAGNOSIS — Z98.49 CATARACT EXTRACTION STATUS, UNSPECIFIED EYE: Chronic | ICD-10-CM

## 2018-11-21 DIAGNOSIS — Z00.8 ENCOUNTER FOR OTHER GENERAL EXAMINATION: ICD-10-CM

## 2018-11-21 DIAGNOSIS — Z96.7 PRESENCE OF OTHER BONE AND TENDON IMPLANTS: Chronic | ICD-10-CM

## 2018-11-21 PROCEDURE — 71046 X-RAY EXAM CHEST 2 VIEWS: CPT | Mod: 26

## 2018-11-21 PROCEDURE — 71046 X-RAY EXAM CHEST 2 VIEWS: CPT

## 2018-11-29 ENCOUNTER — RX RENEWAL (OUTPATIENT)
Age: 83
End: 2018-11-29

## 2019-01-21 ENCOUNTER — RX RENEWAL (OUTPATIENT)
Age: 84
End: 2019-01-21

## 2019-04-13 ENCOUNTER — RX RENEWAL (OUTPATIENT)
Age: 84
End: 2019-04-13

## 2019-04-25 ENCOUNTER — FORM ENCOUNTER (OUTPATIENT)
Age: 84
End: 2019-04-25

## 2019-04-26 ENCOUNTER — OUTPATIENT (OUTPATIENT)
Dept: OUTPATIENT SERVICES | Facility: HOSPITAL | Age: 84
LOS: 1 days | End: 2019-04-26
Payer: COMMERCIAL

## 2019-04-26 ENCOUNTER — APPOINTMENT (OUTPATIENT)
Dept: RADIOLOGY | Facility: CLINIC | Age: 84
End: 2019-04-26
Payer: MEDICARE

## 2019-04-26 ENCOUNTER — APPOINTMENT (OUTPATIENT)
Dept: INTERNAL MEDICINE | Facility: CLINIC | Age: 84
End: 2019-04-26
Payer: MEDICARE

## 2019-04-26 VITALS
WEIGHT: 125 LBS | OXYGEN SATURATION: 90 % | HEIGHT: 63 IN | RESPIRATION RATE: 14 BRPM | BODY MASS INDEX: 22.15 KG/M2 | DIASTOLIC BLOOD PRESSURE: 78 MMHG | HEART RATE: 93 BPM | SYSTOLIC BLOOD PRESSURE: 140 MMHG | TEMPERATURE: 97.9 F

## 2019-04-26 VITALS — OXYGEN SATURATION: 95 %

## 2019-04-26 DIAGNOSIS — Z96.7 PRESENCE OF OTHER BONE AND TENDON IMPLANTS: Chronic | ICD-10-CM

## 2019-04-26 DIAGNOSIS — Z98.49 CATARACT EXTRACTION STATUS, UNSPECIFIED EYE: Chronic | ICD-10-CM

## 2019-04-26 DIAGNOSIS — Z00.8 ENCOUNTER FOR OTHER GENERAL EXAMINATION: ICD-10-CM

## 2019-04-26 LAB
INR PPP: 33.4 RATIO
POCT-PROTHROMBIN TIME: 2.8 SECS
QUALITY CONTROL: YES

## 2019-04-26 PROCEDURE — 85610 PROTHROMBIN TIME: CPT | Mod: QW

## 2019-04-26 PROCEDURE — 71046 X-RAY EXAM CHEST 2 VIEWS: CPT | Mod: 26

## 2019-04-26 PROCEDURE — 99214 OFFICE O/P EST MOD 30 MIN: CPT | Mod: 25

## 2019-04-26 PROCEDURE — 71046 X-RAY EXAM CHEST 2 VIEWS: CPT

## 2019-04-26 NOTE — PHYSICAL EXAM
[Normal Oropharynx] : the oropharynx was normal [Supple] : supple [Normal TMs] : both tympanic membranes were normal [No Lymphadenopathy] : no lymphadenopathy [Clear to Auscultation] : lungs were clear to auscultation bilaterally [No Respiratory Distress] : no respiratory distress  [Normal Rate] : normal rate  [No Edema] : there was no peripheral edema [Non Tender] : non-tender [Soft] : abdomen soft [Normal Affect] : the affect was normal [Normal Insight/Judgement] : insight and judgment were intact [de-identified] : appears dyspneic [de-identified] : irreg, irreg

## 2019-04-26 NOTE — HISTORY OF PRESENT ILLNESS
[FreeTextEntry8] : cc: sob and cough\par \par pt c/o a week of sob and coughing which is productive. Sleeping a lot during the day and has trouble sleeping at night which has been her pattern. Her son is with her and he says a week ago she was fine, the next day she was fine. sob seems to come and go.

## 2019-04-26 NOTE — REVIEW OF SYSTEMS
[Shortness Of Breath] : shortness of breath [Cough] : cough [Dyspnea on Exertion] : dyspnea on exertion [Negative] : Gastrointestinal

## 2019-05-09 ENCOUNTER — NON-APPOINTMENT (OUTPATIENT)
Age: 84
End: 2019-05-09

## 2019-05-09 ENCOUNTER — APPOINTMENT (OUTPATIENT)
Dept: CARDIOLOGY | Facility: CLINIC | Age: 84
End: 2019-05-09
Payer: MEDICARE

## 2019-05-09 VITALS
OXYGEN SATURATION: 99 % | HEART RATE: 81 BPM | HEIGHT: 63 IN | DIASTOLIC BLOOD PRESSURE: 79 MMHG | SYSTOLIC BLOOD PRESSURE: 157 MMHG | BODY MASS INDEX: 21.26 KG/M2 | WEIGHT: 120 LBS

## 2019-05-09 VITALS — DIASTOLIC BLOOD PRESSURE: 80 MMHG | SYSTOLIC BLOOD PRESSURE: 140 MMHG

## 2019-05-09 PROCEDURE — 99215 OFFICE O/P EST HI 40 MIN: CPT

## 2019-05-09 PROCEDURE — 93000 ELECTROCARDIOGRAM COMPLETE: CPT

## 2019-05-09 RX ORDER — CEFDINIR 300 MG/1
300 CAPSULE ORAL TWICE DAILY
Qty: 14 | Refills: 0 | Status: DISCONTINUED | COMMUNITY
Start: 2019-04-29 | End: 2019-05-09

## 2019-05-09 RX ORDER — CEPHALEXIN 500 MG/1
500 CAPSULE ORAL 3 TIMES DAILY
Qty: 21 | Refills: 0 | Status: DISCONTINUED | COMMUNITY
Start: 2018-06-18 | End: 2019-05-09

## 2019-05-09 RX ORDER — CEFUROXIME AXETIL 500 MG/1
500 TABLET ORAL
Qty: 14 | Refills: 0 | Status: DISCONTINUED | COMMUNITY
Start: 2019-04-26 | End: 2019-05-09

## 2019-05-18 ENCOUNTER — RX RENEWAL (OUTPATIENT)
Age: 84
End: 2019-05-18

## 2019-05-20 ENCOUNTER — APPOINTMENT (OUTPATIENT)
Dept: CARDIOLOGY | Facility: CLINIC | Age: 84
End: 2019-05-20
Payer: MEDICARE

## 2019-05-20 PROCEDURE — 93306 TTE W/DOPPLER COMPLETE: CPT

## 2019-05-31 ENCOUNTER — APPOINTMENT (OUTPATIENT)
Dept: CARDIOLOGY | Facility: CLINIC | Age: 84
End: 2019-05-31
Payer: MEDICARE

## 2019-05-31 PROCEDURE — 93015 CV STRESS TEST SUPVJ I&R: CPT

## 2019-05-31 PROCEDURE — A9500: CPT

## 2019-05-31 PROCEDURE — 78452 HT MUSCLE IMAGE SPECT MULT: CPT

## 2019-06-03 ENCOUNTER — RX RENEWAL (OUTPATIENT)
Age: 84
End: 2019-06-03

## 2019-06-03 ENCOUNTER — MEDICATION RENEWAL (OUTPATIENT)
Age: 84
End: 2019-06-03

## 2019-06-18 ENCOUNTER — APPOINTMENT (OUTPATIENT)
Dept: CARDIOLOGY | Facility: CLINIC | Age: 84
End: 2019-06-18
Payer: MEDICARE

## 2019-06-18 ENCOUNTER — NON-APPOINTMENT (OUTPATIENT)
Age: 84
End: 2019-06-18

## 2019-06-18 VITALS
WEIGHT: 122 LBS | HEIGHT: 63 IN | HEART RATE: 76 BPM | BODY MASS INDEX: 21.62 KG/M2 | OXYGEN SATURATION: 96 % | DIASTOLIC BLOOD PRESSURE: 76 MMHG | SYSTOLIC BLOOD PRESSURE: 158 MMHG

## 2019-06-18 PROCEDURE — 93000 ELECTROCARDIOGRAM COMPLETE: CPT

## 2019-06-18 PROCEDURE — 99215 OFFICE O/P EST HI 40 MIN: CPT

## 2019-07-09 ENCOUNTER — APPOINTMENT (OUTPATIENT)
Dept: INTERNAL MEDICINE | Facility: CLINIC | Age: 84
End: 2019-07-09
Payer: MEDICARE

## 2019-07-09 ENCOUNTER — INPATIENT (INPATIENT)
Facility: HOSPITAL | Age: 84
LOS: 5 days | Discharge: ANOTHER HOSPITAL | DRG: 303 | End: 2019-07-15
Attending: FAMILY MEDICINE | Admitting: HOSPITALIST
Payer: COMMERCIAL

## 2019-07-09 VITALS
HEART RATE: 75 BPM | OXYGEN SATURATION: 95 % | DIASTOLIC BLOOD PRESSURE: 80 MMHG | TEMPERATURE: 98 F | RESPIRATION RATE: 17 BRPM | WEIGHT: 122 LBS | BODY MASS INDEX: 21.61 KG/M2 | SYSTOLIC BLOOD PRESSURE: 164 MMHG

## 2019-07-09 VITALS
HEIGHT: 64 IN | DIASTOLIC BLOOD PRESSURE: 73 MMHG | RESPIRATION RATE: 26 BRPM | TEMPERATURE: 98 F | WEIGHT: 121.92 LBS | OXYGEN SATURATION: 99 % | SYSTOLIC BLOOD PRESSURE: 171 MMHG | HEART RATE: 76 BPM

## 2019-07-09 DIAGNOSIS — I48.91 UNSPECIFIED ATRIAL FIBRILLATION: ICD-10-CM

## 2019-07-09 DIAGNOSIS — R06.02 SHORTNESS OF BREATH: ICD-10-CM

## 2019-07-09 DIAGNOSIS — E11.9 TYPE 2 DIABETES MELLITUS WITHOUT COMPLICATIONS: ICD-10-CM

## 2019-07-09 DIAGNOSIS — Z29.9 ENCOUNTER FOR PROPHYLACTIC MEASURES, UNSPECIFIED: ICD-10-CM

## 2019-07-09 DIAGNOSIS — D72.829 ELEVATED WHITE BLOOD CELL COUNT, UNSPECIFIED: ICD-10-CM

## 2019-07-09 DIAGNOSIS — Z98.49 CATARACT EXTRACTION STATUS, UNSPECIFIED EYE: Chronic | ICD-10-CM

## 2019-07-09 DIAGNOSIS — D47.1 CHRONIC MYELOPROLIFERATIVE DISEASE: ICD-10-CM

## 2019-07-09 DIAGNOSIS — I25.10 ATHEROSCLEROTIC HEART DISEASE OF NATIVE CORONARY ARTERY WITHOUT ANGINA PECTORIS: ICD-10-CM

## 2019-07-09 DIAGNOSIS — R10.9 UNSPECIFIED ABDOMINAL PAIN: ICD-10-CM

## 2019-07-09 DIAGNOSIS — I10 ESSENTIAL (PRIMARY) HYPERTENSION: ICD-10-CM

## 2019-07-09 DIAGNOSIS — E78.00 PURE HYPERCHOLESTEROLEMIA, UNSPECIFIED: ICD-10-CM

## 2019-07-09 DIAGNOSIS — Z96.7 PRESENCE OF OTHER BONE AND TENDON IMPLANTS: Chronic | ICD-10-CM

## 2019-07-09 LAB
ALBUMIN SERPL ELPH-MCNC: 3.7 G/DL — SIGNIFICANT CHANGE UP (ref 3.3–5)
ALP SERPL-CCNC: 189 U/L — HIGH (ref 40–120)
ALT FLD-CCNC: 20 U/L — SIGNIFICANT CHANGE UP (ref 12–78)
ANION GAP SERPL CALC-SCNC: 8 MMOL/L — SIGNIFICANT CHANGE UP (ref 5–17)
APPEARANCE UR: CLEAR — SIGNIFICANT CHANGE UP
APTT BLD: 46.6 SEC — HIGH (ref 27.5–36.3)
AST SERPL-CCNC: 12 U/L — LOW (ref 15–37)
BASOPHILS # BLD AUTO: 0.1 K/UL — SIGNIFICANT CHANGE UP (ref 0–0.2)
BASOPHILS NFR BLD AUTO: 0.5 % — SIGNIFICANT CHANGE UP (ref 0–2)
BILIRUB SERPL-MCNC: 1.8 MG/DL — HIGH (ref 0.2–1.2)
BILIRUB UR-MCNC: NEGATIVE — SIGNIFICANT CHANGE UP
BUN SERPL-MCNC: 16 MG/DL — SIGNIFICANT CHANGE UP (ref 7–23)
CALCIUM SERPL-MCNC: 8.6 MG/DL — SIGNIFICANT CHANGE UP (ref 8.5–10.1)
CHLORIDE SERPL-SCNC: 106 MMOL/L — SIGNIFICANT CHANGE UP (ref 96–108)
CO2 SERPL-SCNC: 26 MMOL/L — SIGNIFICANT CHANGE UP (ref 22–31)
COLOR SPEC: YELLOW — SIGNIFICANT CHANGE UP
CREAT SERPL-MCNC: 0.88 MG/DL — SIGNIFICANT CHANGE UP (ref 0.5–1.3)
DIFF PNL FLD: ABNORMAL
EOSINOPHIL # BLD AUTO: 0.06 K/UL — SIGNIFICANT CHANGE UP (ref 0–0.5)
EOSINOPHIL NFR BLD AUTO: 0.3 % — SIGNIFICANT CHANGE UP (ref 0–6)
GLUCOSE SERPL-MCNC: 209 MG/DL — HIGH (ref 70–99)
GLUCOSE UR QL: NEGATIVE — SIGNIFICANT CHANGE UP
HCT VFR BLD CALC: 44.8 % — SIGNIFICANT CHANGE UP (ref 34.5–45)
HGB BLD-MCNC: 12.5 G/DL — SIGNIFICANT CHANGE UP (ref 11.5–15.5)
IMM GRANULOCYTES NFR BLD AUTO: 1.7 % — HIGH (ref 0–1.5)
INR BLD: 2.25 RATIO — HIGH (ref 0.88–1.16)
KETONES UR-MCNC: NEGATIVE — SIGNIFICANT CHANGE UP
LEUKOCYTE ESTERASE UR-ACNC: ABNORMAL
LIDOCAIN IGE QN: 154 U/L — SIGNIFICANT CHANGE UP (ref 73–393)
LYMPHOCYTES # BLD AUTO: 0.78 K/UL — LOW (ref 1–3.3)
LYMPHOCYTES # BLD AUTO: 3.6 % — LOW (ref 13–44)
MAGNESIUM SERPL-MCNC: 2.1 MG/DL — SIGNIFICANT CHANGE UP (ref 1.6–2.6)
MCHC RBC-ENTMCNC: 24.1 PG — LOW (ref 27–34)
MCHC RBC-ENTMCNC: 27.9 GM/DL — LOW (ref 32–36)
MCV RBC AUTO: 86.5 FL — SIGNIFICANT CHANGE UP (ref 80–100)
MONOCYTES # BLD AUTO: 0.61 K/UL — SIGNIFICANT CHANGE UP (ref 0–0.9)
MONOCYTES NFR BLD AUTO: 2.8 % — SIGNIFICANT CHANGE UP (ref 2–14)
NEUTROPHILS # BLD AUTO: 19.72 K/UL — HIGH (ref 1.8–7.4)
NEUTROPHILS NFR BLD AUTO: 91.1 % — HIGH (ref 43–77)
NITRITE UR-MCNC: POSITIVE
NRBC # BLD: 0 /100 WBCS — SIGNIFICANT CHANGE UP (ref 0–0)
NT-PROBNP SERPL-SCNC: 2567 PG/ML — HIGH (ref 0–450)
PH UR: 5 — SIGNIFICANT CHANGE UP (ref 5–8)
PLATELET # BLD AUTO: 215 K/UL — SIGNIFICANT CHANGE UP (ref 150–400)
POTASSIUM SERPL-MCNC: 4.4 MMOL/L — SIGNIFICANT CHANGE UP (ref 3.5–5.3)
POTASSIUM SERPL-SCNC: 4.4 MMOL/L — SIGNIFICANT CHANGE UP (ref 3.5–5.3)
PROT SERPL-MCNC: 6.7 G/DL — SIGNIFICANT CHANGE UP (ref 6–8.3)
PROT UR-MCNC: NEGATIVE — SIGNIFICANT CHANGE UP
PROTHROM AB SERPL-ACNC: 26.1 SEC — HIGH (ref 10–12.9)
RBC # BLD: 5.18 M/UL — SIGNIFICANT CHANGE UP (ref 3.8–5.2)
RBC # FLD: 18.2 % — HIGH (ref 10.3–14.5)
SODIUM SERPL-SCNC: 140 MMOL/L — SIGNIFICANT CHANGE UP (ref 135–145)
SP GR SPEC: 1 — LOW (ref 1.01–1.02)
TROPONIN I SERPL-MCNC: <.015 NG/ML — SIGNIFICANT CHANGE UP (ref 0.01–0.04)
UROBILINOGEN FLD QL: NEGATIVE — SIGNIFICANT CHANGE UP
WBC # BLD: 21.64 K/UL — HIGH (ref 3.8–10.5)
WBC # FLD AUTO: 21.64 K/UL — HIGH (ref 3.8–10.5)

## 2019-07-09 PROCEDURE — 93010 ELECTROCARDIOGRAM REPORT: CPT

## 2019-07-09 PROCEDURE — 71045 X-RAY EXAM CHEST 1 VIEW: CPT | Mod: 26

## 2019-07-09 PROCEDURE — 99223 1ST HOSP IP/OBS HIGH 75: CPT | Mod: GC

## 2019-07-09 PROCEDURE — 99214 OFFICE O/P EST MOD 30 MIN: CPT

## 2019-07-09 PROCEDURE — 99223 1ST HOSP IP/OBS HIGH 75: CPT

## 2019-07-09 PROCEDURE — 99285 EMERGENCY DEPT VISIT HI MDM: CPT

## 2019-07-09 PROCEDURE — 74177 CT ABD & PELVIS W/CONTRAST: CPT | Mod: 26

## 2019-07-09 PROCEDURE — 71260 CT THORAX DX C+: CPT | Mod: 26

## 2019-07-09 RX ORDER — IOHEXOL 300 MG/ML
30 INJECTION, SOLUTION INTRAVENOUS ONCE
Refills: 0 | Status: COMPLETED | OUTPATIENT
Start: 2019-07-09 | End: 2019-07-09

## 2019-07-09 RX ORDER — PANTOPRAZOLE SODIUM 20 MG/1
40 TABLET, DELAYED RELEASE ORAL
Refills: 0 | Status: DISCONTINUED | OUTPATIENT
Start: 2019-07-09 | End: 2019-07-15

## 2019-07-09 RX ORDER — GLUCAGON INJECTION, SOLUTION 0.5 MG/.1ML
1 INJECTION, SOLUTION SUBCUTANEOUS ONCE
Refills: 0 | Status: DISCONTINUED | OUTPATIENT
Start: 2019-07-09 | End: 2019-07-15

## 2019-07-09 RX ORDER — PIPERACILLIN AND TAZOBACTAM 4; .5 G/20ML; G/20ML
3.38 INJECTION, POWDER, LYOPHILIZED, FOR SOLUTION INTRAVENOUS EVERY 8 HOURS
Refills: 0 | Status: DISCONTINUED | OUTPATIENT
Start: 2019-07-10 | End: 2019-07-12

## 2019-07-09 RX ORDER — DEXTROSE 50 % IN WATER 50 %
15 SYRINGE (ML) INTRAVENOUS ONCE
Refills: 0 | Status: DISCONTINUED | OUTPATIENT
Start: 2019-07-09 | End: 2019-07-15

## 2019-07-09 RX ORDER — CHOLECALCIFEROL (VITAMIN D3) 125 MCG
1000 CAPSULE ORAL DAILY
Refills: 0 | Status: DISCONTINUED | OUTPATIENT
Start: 2019-07-09 | End: 2019-07-15

## 2019-07-09 RX ORDER — DOCUSATE SODIUM 100 MG
100 CAPSULE ORAL DAILY
Refills: 0 | Status: DISCONTINUED | OUTPATIENT
Start: 2019-07-09 | End: 2019-07-11

## 2019-07-09 RX ORDER — PSYLLIUM SEED (WITH DEXTROSE)
1 POWDER (GRAM) ORAL DAILY
Refills: 0 | Status: DISCONTINUED | OUTPATIENT
Start: 2019-07-09 | End: 2019-07-09

## 2019-07-09 RX ORDER — HYDROXYUREA 500 MG/1
500 CAPSULE ORAL DAILY
Refills: 0 | Status: DISCONTINUED | OUTPATIENT
Start: 2019-07-10 | End: 2019-07-15

## 2019-07-09 RX ORDER — SODIUM CHLORIDE 9 MG/ML
1000 INJECTION, SOLUTION INTRAVENOUS
Refills: 0 | Status: DISCONTINUED | OUTPATIENT
Start: 2019-07-09 | End: 2019-07-15

## 2019-07-09 RX ORDER — INSULIN LISPRO 100/ML
VIAL (ML) SUBCUTANEOUS
Refills: 0 | Status: DISCONTINUED | OUTPATIENT
Start: 2019-07-09 | End: 2019-07-15

## 2019-07-09 RX ORDER — DEXTROSE 50 % IN WATER 50 %
25 SYRINGE (ML) INTRAVENOUS ONCE
Refills: 0 | Status: DISCONTINUED | OUTPATIENT
Start: 2019-07-09 | End: 2019-07-15

## 2019-07-09 RX ORDER — PSYLLIUM SEED (WITH DEXTROSE)
1 POWDER (GRAM) ORAL
Qty: 0 | Refills: 0 | DISCHARGE

## 2019-07-09 RX ORDER — LISINOPRIL 2.5 MG/1
20 TABLET ORAL DAILY
Refills: 0 | Status: DISCONTINUED | OUTPATIENT
Start: 2019-07-09 | End: 2019-07-15

## 2019-07-09 RX ORDER — POLYETHYLENE GLYCOL 3350 17 G/17G
17 POWDER, FOR SOLUTION ORAL DAILY
Refills: 0 | Status: DISCONTINUED | OUTPATIENT
Start: 2019-07-09 | End: 2019-07-11

## 2019-07-09 RX ORDER — ATORVASTATIN CALCIUM 80 MG/1
40 TABLET, FILM COATED ORAL AT BEDTIME
Refills: 0 | Status: DISCONTINUED | OUTPATIENT
Start: 2019-07-09 | End: 2019-07-15

## 2019-07-09 RX ORDER — WARFARIN SODIUM 2.5 MG/1
3 TABLET ORAL ONCE
Refills: 0 | Status: DISCONTINUED | OUTPATIENT
Start: 2019-07-09 | End: 2019-07-09

## 2019-07-09 RX ORDER — HYDROXYUREA 500 MG/1
0 CAPSULE ORAL
Qty: 0 | Refills: 0 | DISCHARGE

## 2019-07-09 RX ORDER — WARFARIN SODIUM 2.5 MG/1
2.5 TABLET ORAL ONCE
Refills: 0 | Status: COMPLETED | OUTPATIENT
Start: 2019-07-09 | End: 2019-07-09

## 2019-07-09 RX ORDER — DEXTROSE 50 % IN WATER 50 %
12.5 SYRINGE (ML) INTRAVENOUS ONCE
Refills: 0 | Status: DISCONTINUED | OUTPATIENT
Start: 2019-07-09 | End: 2019-07-15

## 2019-07-09 RX ORDER — INSULIN LISPRO 100/ML
VIAL (ML) SUBCUTANEOUS AT BEDTIME
Refills: 0 | Status: DISCONTINUED | OUTPATIENT
Start: 2019-07-09 | End: 2019-07-15

## 2019-07-09 RX ORDER — ASPIRIN/CALCIUM CARB/MAGNESIUM 324 MG
81 TABLET ORAL DAILY
Refills: 0 | Status: DISCONTINUED | OUTPATIENT
Start: 2019-07-09 | End: 2019-07-15

## 2019-07-09 RX ORDER — PIPERACILLIN AND TAZOBACTAM 4; .5 G/20ML; G/20ML
3.38 INJECTION, POWDER, LYOPHILIZED, FOR SOLUTION INTRAVENOUS ONCE
Refills: 0 | Status: COMPLETED | OUTPATIENT
Start: 2019-07-09 | End: 2019-07-09

## 2019-07-09 RX ORDER — METOPROLOL TARTRATE 50 MG
25 TABLET ORAL DAILY
Refills: 0 | Status: DISCONTINUED | OUTPATIENT
Start: 2019-07-09 | End: 2019-07-15

## 2019-07-09 RX ADMIN — PIPERACILLIN AND TAZOBACTAM 200 GRAM(S): 4; .5 INJECTION, POWDER, LYOPHILIZED, FOR SOLUTION INTRAVENOUS at 20:29

## 2019-07-09 RX ADMIN — PIPERACILLIN AND TAZOBACTAM 3.38 GRAM(S): 4; .5 INJECTION, POWDER, LYOPHILIZED, FOR SOLUTION INTRAVENOUS at 21:55

## 2019-07-09 RX ADMIN — IOHEXOL 30 MILLILITER(S): 300 INJECTION, SOLUTION INTRAVENOUS at 18:00

## 2019-07-09 NOTE — H&P ADULT - NSICDXPASTMEDICALHX_GEN_ALL_CORE_FT
PAST MEDICAL HISTORY:  Anal fissure     Atrial fibrillation on coumadin    Elevated red blood cell count     Hearing loss     HTN (hypertension)     Hypercholesterolemia     Myeloproliferative disease     Myocardial infarction 8 yrs ago    Skin cancer of back - removed    Type 2 diabetes mellitus not on insulin

## 2019-07-09 NOTE — PHYSICAL EXAM
[No Acute Distress] : no acute distress [Normal Sclera/Conjunctiva] : normal sclera/conjunctiva [PERRL] : pupils equal round and reactive to light [EOMI] : extraocular movements intact [No Respiratory Distress] : no respiratory distress  [No Accessory Muscle Use] : no accessory muscle use [Clear to Auscultation] : lungs were clear to auscultation bilaterally [Normal Rate] : normal rate  [Regular Rhythm] : with a regular rhythm [Normal S1, S2] : normal S1 and S2 [No Edema] : there was no peripheral edema [Soft] : abdomen soft [Non Tender] : non-tender [Normal Bowel Sounds] : normal bowel sounds [de-identified] : systolic murmur

## 2019-07-09 NOTE — REVIEW OF SYSTEMS
[Fever] : no fever [Chills] : no chills [Night Sweats] : no night sweats [Chest Pain] : chest pain [Palpitations] : no palpitations [Lower Ext Edema] : no lower extremity edema [Shortness Of Breath] : shortness of breath [Cough] : cough [Abdominal Pain] : no abdominal pain [Nausea] : no nausea [Constipation] : no constipation [Diarrhea] : diarrhea [Vomiting] : no vomiting [Dysuria] : no dysuria

## 2019-07-09 NOTE — HISTORY OF PRESENT ILLNESS
[Initial Evaluation] : an initial evaluation of [Chest Pain] : chest pain [Shortness of Breath] : shortness of breath [Cough] : cough [Peripheral Edema] : no peripheral edema [Palpitations] : no palpitations [Currently Experiencing] : The patient is currently experiencing symptoms. [Substernal Area] : substernal area [Aching] : aching [Sudden] : sudden [___ Day(s) Ago] : [unfilled] day(s) ago [Constant] : constantly [Daily] : occur daily [Moderate] : moderate in severity [Worsening] : worsening [Coughing] : coughing [Syncope] : no syncope [Nausea] : no nausea [Vomiting] : no vomiting [Leg Swelling] : no leg swelling

## 2019-07-09 NOTE — ED ADULT NURSE NOTE - NSIMPLEMENTINTERV_GEN_ALL_ED
Implemented All Fall Risk Interventions:  [place] to call system. Call bell, personal items and telephone within reach. Instruct patient to call for assistance. Room bathroom lighting operational. Non-slip footwear when patient is off stretcher. Physically safe environment: no spills, clutter or unnecessary equipment. Stretcher in lowest position, wheels locked, appropriate side rails in place. Provide visual cue, wrist band, yellow gown, etc. Monitor gait and stability. Monitor for mental status changes and reorient to person, place, and time. Review medications for side effects contributing to fall risk. Reinforce activity limits and safety measures with patient and family.

## 2019-07-09 NOTE — H&P ADULT - PROBLEM SELECTOR PLAN 4
HR stable  - Continue to monitor on telemetry  - Continue Toprol XL 25mg qd with hold parameters  - Pt takes Coumadin 2 mg MWF and 3 mg T, Th, Sa, Su. INR therapeutic 2.25.  Will give 3mg tonight to maintain INR between 2-3. HR stable  - Continue to monitor on telemetry  - Continue Toprol XL 25mg qd with hold parameters  - Pt takes Coumadin 2 mg MWF and 3 mg T, Th, Sa, Su. INR therapeutic 2.25.  Will give 2.5mg tonight (as per coumadin initiative) to maintain INR between 2-3.

## 2019-07-09 NOTE — H&P ADULT - NSICDXPASTSURGICALHX_GEN_ALL_CORE_FT
PAST SURGICAL HISTORY:  History of cataract surgery 03/2016,  06/2016    S/P ORIF (open reduction internal fixation) fracture right hip pinning  - 5 yrs ago

## 2019-07-09 NOTE — H&P ADULT - PROBLEM SELECTOR PLAN 8
Elevated in ER. Likely 2/2 abdominal pain, sob or consider anxiety componenet  - Continue Ramipril with therapeutic interchange to Lisinopril 20mg qd  - Continue Metoprolol 25mg qd

## 2019-07-09 NOTE — H&P ADULT - HISTORY OF PRESENT ILLNESS
89F w/pmh of Afib on coumadin, Myeloproliferative disorder on hydroxyurea, HTN, HLD, CAD s/p prior MI (8 years ago) with a known chronically occluded RCA, moderate disease in LCX and LAD managed medically, normal LV and Type 2 DM not on insulin presenting with episode of dyspnea on exertion and abdominal pain this morning. States that she generally has shortness of breath with exertion but the abdominal discomfort was new today. Admits to productive cough. Difficult for her to describe abdominal pain but states that it was epigastric, non-radiating and felt as if she needed to go to the bathroom but could not. She has a history of constipation and admits to recent diarrhea but takes miralax and colace daily. Last bowel movement this morning. Denies eating anything out of the ordinary. Also admits that she felt alone this morning when these symptoms occurred. Denies headache, fevers/chills, n/v, chest pain, palpitations, diarrhea, dysuria, hematuria, edema or weakness. Denies recent use of steroids. Denies recent falls, sick contacts or recent travel. Of note, patient's symptoms improved upon arrival to ER. Unknown last colonoscopy.     In the ED, patient's vitals were: T97.9F, HR 76, /73, RR 26 and SpO2 99% on room air. Significant labs include: WBC 21.64, INR 2.25, Tbili 1.8, alk phos 189, trops neg x1, proBNP 2567.   CT A/P: Nonspecific geographic groundglass airspace opacity, suggestive of air   trapping. Numerous predominantly small volume mediastinal and bilateral hilar   nodes, of unclear etiology. Colonic diverticulosis without diverticulitis or evidence of acute inflammation. No bowel obstruction. Splenomegaly.  CXR: stable chest  Pt given Zosyn x1.   EKG: Afib, HR 68, T wave inversion in V1 89F w/pmh of Afib on coumadin, Myeloproliferative disorder on hydroxyurea, HTN, HLD, CAD s/p prior MI (8 years ago) with a known chronically occluded RCA, moderate disease in LCX and LAD managed medically, normal LV and Type 2 DM not on insulin presenting with episode of dyspnea on exertion and abdominal pain this morning. States that she generally has shortness of breath with exertion but the abdominal discomfort was new today. Admits to productive cough. Difficult for her to describe abdominal pain but states that it was epigastric, non-radiating and felt as if she needed to go to the bathroom but could not. She has a history of constipation and admits to recent diarrhea but takes miralax and colace daily. Last bowel movement this morning. Denies eating anything out of the ordinary. Also admits that she felt alone this morning when these symptoms occurred. Denies headache, fevers/chills, n/v, chest pain, palpitations, diarrhea, dysuria, hematuria, edema or weakness. Denies recent use of steroids. Denies recent falls, sick contacts or recent travel. Of note, patient's symptoms improved upon arrival to ER. Unknown last colonoscopy.     In the ED, patient's vitals were: T97.9F, HR 76, /73, RR 26 and SpO2 99% on room air. Significant labs include: WBC 21.64, INR 2.25, Tbili 1.8, alk phos 189, trops neg x1, proBNP 2567.   CT A/P: Nonspecific geographic groundglass airspace opacity, suggestive of air   trapping. Numerous predominantly small volume mediastinal and bilateral hilar   nodes, of unclear etiology. Colonic diverticulosis without diverticulitis or evidence of acute inflammation. No bowel obstruction. Splenomegaly.  CXR: stable chest  Pt given Zosyn x1.   EKG: Afib, HR 68, T wave inversion in V1    U/A shows +nitrite

## 2019-07-09 NOTE — H&P ADULT - ASSESSMENT
89F w/pmh of Afib on coumadin, Myeloproliferative disorder on hydroxyurea, HTN, HLD, CAD s/p prior MI (8 years ago) with a known chronically occluded RCA, moderate disease in LCX and LAD managed medically, normal LV and Type 2 DM not on insulin presenting with episode of dyspnea on exertion and abdominal pain admitted with abdominal pain with possible diverticulitis, found to have leukocytosis. Also with sob, r/o ischemic heart disease.

## 2019-07-09 NOTE — H&P ADULT - NSHPPHYSICALEXAM_GEN_ALL_CORE
Vital Signs Last 24 Hrs  T(C): 36.6 (09 Jul 2019 16:19), Max: 36.6 (09 Jul 2019 16:19)  T(F): 97.9 (09 Jul 2019 16:19), Max: 97.9 (09 Jul 2019 16:19)  HR: 76 (09 Jul 2019 16:19) (76 - 76)  BP: 171/73 (09 Jul 2019 16:19) (171/73 - 171/73)  RR: 26 (09 Jul 2019 16:19) (26 - 26)  SpO2: 99% (09 Jul 2019 16:19) (99% - 99%)    General: Well developed, well nourished, NAD  HEENT: NCAT, PERRLA, EOMI bl, moist mucous membranes   Neurology: A&Ox3, nonfocal, sensation intact, no gait abnormalities   Respiratory: CTA B/L, No W/R/R  CV: irregular, +S1/S2, +systolic murmur, no rubs or gallops  Abdominal: Soft, NT, ND +BSx4, no guarding, no rebound, neg narvaez's sign  Extremities: No C/C/E, + peripheral pulses  MSK: Normal ROM, no joint erythema or warmth, no joint swelling   Skin: warm, dry, normal color

## 2019-07-09 NOTE — ED PROVIDER NOTE - NS ED ROS FT

## 2019-07-09 NOTE — H&P ADULT - PROBLEM SELECTOR PLAN 2
R/o ischemic heart disease. Pt with known occluded RCA , moderate disease in LCX and LAD with positive nuclear stress test. Low suspicion for PE as patient therapeutically anticoagulated.  - Trops neg x1. Continue to trend  - Supplemental O2 prn  - CT chest showed Nonspecific geographic groundglass airspace opacity, suggestive of air trapping. Numerous predominantly small volume mediastinal and bilateral hilar nodes, of unclear etiology.  - consider pulm consult if sob worsens R/o ischemic heart disease. Pt with known occluded RCA , moderate disease in LCX and LAD with positive nuclear stress test. Low suspicion for PE as patient therapeutically anticoagulated.  - Trops neg x1. Continue to trend  - Supplemental O2 prn  - CT chest showed Nonspecific geographic groundglass airspace opacity, suggestive of air trapping. Numerous predominantly small volume mediastinal and bilateral hilar nodes, of unclear etiology.  - consider pulm consult if sob worsens  - Check TSH R/o ischemic heart disease. Pt with known occluded RCA , moderate disease in LCX and LAD with positive nuclear stress test. Low suspicion for PE as patient therapeutically anticoagulated.  - Trops neg x1. Continue to trend  - Supplemental O2 prn  - CT chest showed Nonspecific geographic groundglass airspace opacity, suggestive of air trapping. Numerous predominantly small volume mediastinal and bilateral hilar nodes, of unclear etiology.  - consider pulm consult if sob worsens  - Check TSH  - Cardio- Dr. Rosela consulted

## 2019-07-09 NOTE — ED PROVIDER NOTE - CARE PLAN
Principal Discharge DX:	Shortness of breath Principal Discharge DX:	Shortness of breath  Secondary Diagnosis:	Diverticulosis  Secondary Diagnosis:	Leukocytosis

## 2019-07-09 NOTE — ASSESSMENT
[FreeTextEntry1] : Acute decompensated heart failure: Vs PNA. Discussed sending patient to [facility] ED for further workup of CHF vs PNA. Patient's son is taking her to ED. Discussed with triage nurse.

## 2019-07-09 NOTE — H&P ADULT - PROBLEM SELECTOR PLAN 10
IMPROVE VTE Individual Risk Assessment          RISK                                                          Points  [  ] Previous VTE                                                3  [  ] Thrombophilia                                             2  [  ] Lower limb paralysis                                   2        (unable to hold up >15 seconds)    [  ] Current Cancer                                             2         (within 6 months)  [  ] Immobilization > 24 hrs                              1  [  ] ICU/CCU stay > 24 hours                             1  [ x ] Age > 60                                                         1    IMPROVE VTE Score: 1  DVT ppx: On coumadin

## 2019-07-09 NOTE — H&P ADULT - PROBLEM SELECTOR PLAN 6
Hx of MI 8 years ago. No stents  - Continue asa 81mg qd, Atorvastatin 40mg qhs, Ramipril 5mg with therapeutic interchange with lisinopril 20mg qd

## 2019-07-09 NOTE — H&P ADULT - NSICDXFAMILYHX_GEN_ALL_CORE_FT
FAMILY HISTORY:  Family history of cancer, mother - pancreatic ca  Family history of heart failure, father    Sibling  Still living? No  Family history of cancer, Age at diagnosis: Age Unknown

## 2019-07-09 NOTE — ED PROVIDER NOTE - OBJECTIVE STATEMENT
89 yo female 89 yo female hx of afib, htn, hld, MI, DM c/o shortness of breath that began this morning, worse with exertion. +productive cough. Nonradiating. Moderate. No chest pain.  No fever/chills.  Has appt to see Dr. Coley on Friday.  No leg swelling.    pmd Dr. Peña  cards Dr. méndez

## 2019-07-09 NOTE — H&P ADULT - NSHPSOCIALHISTORY_GEN_ALL_CORE
Lives with granddaughter. Ambulates with cane. Can perform all ADLs independently.  Tobacco: very remote history, no recent smoking  Etoh: rarely

## 2019-07-09 NOTE — H&P ADULT - PROBLEM SELECTOR PLAN 1
Admit to telemetry  - CT A/P showed colonic diverticulosis without diverticulitis or evidence of acute   inflammation. No bowel obstruction.  -Pain may be due to constipation in the setting of diverticulosis. Pain improved in hospital. Low suspicion for cholecystitis vs. uti vs. acid reflux.  - s/p Zosyn in ER. Continue Zosyn   - Follow up blood and urine cultures  - UA with trace LE and positive nitrites, occasional bacteria. Pt asymptomatic  - CLD  - PPI 40mg qd  - f/u procalcitonin  - For chronic constipation, continue colace and miralax  - GI consult- Dr. Ventura consulted Admit to telemetry  - CT A/P showed colonic diverticulosis without diverticulitis or evidence of acute   inflammation. No bowel obstruction.  -Pain may be due to constipation in the setting of diverticulosis. Pain improved in hospital. Low suspicion for cholecystitis vs. uti vs. acid reflux.  - s/p Zosyn in ER. Continue Zosyn   - Follow up blood and urine cultures  - T. bili elevated. CT showed normal gallbladder. f/u fractionated bilirubin  - UA with trace LE and positive nitrites, occasional bacteria. Pt asymptomatic  - CLD  - PPI 40mg qd  - f/u procalcitonin  - For chronic constipation, continue colace and miralax  - GI consult- Dr. Ventura consulted

## 2019-07-09 NOTE — H&P ADULT - PROBLEM SELECTOR PLAN 7
49 As per son, patient takes Hydroxyurea 500mg qd for elevated RBC count  - RBC stable   - Pt sees Heme/onc, Dr. Burgess. Consider consulting if leukocytosis persists without source

## 2019-07-09 NOTE — CONSULT NOTE ADULT - SUBJECTIVE AND OBJECTIVE BOX
[facility] - Elida Douglas, Ayaz Macias, Troy Coley Savella  Office Number: [phone]    Initial Consult Note    CHIEF COMPLAINT: Patient is a 89y old  Female who presents with a chief complaint of dyspnea    HPI:  This is a 89 year old woman with a history of CAD s/p prior MI with a known chronically occluded RCA, moderate disease in the LCX and LAD that has been managed medically, normal LV function, hypertension, AF on AC with Coumadin, DM who has been having increased dyspnea and presents to the ED with respiratory distress today.  I worked her up in the office, and her echo showed normal LV function with mild valvular heart disease.  She also had a nuclear stress test that showed mild anterior ischemia with TID.  I discussed this with them, and we elected to manage this medically, at least initially.  She today started having acute respiratory distress.  She She saw her PMD, and he sent her in.  She denies chest pain, does not have fever or increasing cough.  She denies PND, orthopnea, dizziness, or syncope.         PAST MEDICAL & SURGICAL HISTORY:  Anal fissure  Elevated red blood cell count  Atrial fibrillation  Skin cancer: of back - removed  Type 2 diabetes mellitus  Hypercholesterolemia  HTN (hypertension)  Myocardial infarction: 8 yrs ago  Hearing loss  S/P ORIF (open reduction internal fixation) fracture: right hip pinning  - 5 yrs ago  History of cataract surgery: 2016,  2016      SOCIAL HISTORY:  No tobacco, ethanol, or drug abuse.    FAMILY HISTORY:  Family history of cancer (Sibling): sister -   Family history of heart failure (Father): father  Family history of cancer (Mother): mother - pancreatic ca    MEDICATIONS  (STANDING):  Home medications reviewed with patient and son in detail        Allergies    No Known Allergies    Intolerances        REVIEW OF SYSTEMS:    All other review of systems is negative unless indicated above    VITAL SIGNS:   Vital Signs Last 24 Hrs  T(C): 36.6 (2019 16:19), Max: 36.6 (2019 16:19)  T(F): 97.9 (2019 16:19), Max: 97.9 (2019 16:19)  HR: 76 (2019 16:19) (76 - 76)  BP: 171/73 (2019 16:19) (171/73 - 171/73)  BP(mean): --  RR: 26 (2019 16:19) (26 - 26)  SpO2: 99% (2019 16:19) (99% - 99%)    I&O's Summary      On Exam:    Constitutional: NAD, alert and oriented x 3  Lungs:  Non-labored, breath sounds are clear bilaterally, No wheezing, rales or rhonchi  Cardiovascular: IRRR.  S1 and S2 positive.  1/6 systolic murmur  Gastrointestinal: Bowel Sounds present, soft, nontender.   Lymph: No peripheral edema. No cervical lymphadenopathy.  Neurological: Alert, no focal deficits  Skin: No rashes or ulcers   Psych:  Mood & affect appropriate.    LABS: All Labs Reviewed:                        12.5   21.64 )-----------( 215      ( 2019 18:04 )             44.8     2019 18:04    140    |  106    |  16     ----------------------------<  209    4.4     |  26     |  0.88     Ca    8.6        2019 18:04  Mg     2.1       2019 18:04    TPro  6.7    /  Alb  3.7    /  TBili  1.8    /  DBili  x      /  AST  12     /  ALT  20     /  AlkPhos  189    2019 18:04    PT/INR - ( 2019 18:04 )   PT: 26.1 sec;   INR: 2.25 ratio         PTT - ( 2019 18:04 )  PTT:46.6 sec  CARDIAC MARKERS ( 2019 18:04 )  <.015 ng/mL / x     / x     / x     / x          Blood Culture:   - @ 18:04  Pro Bnp [id number]        RADIOLOGY:  CXR clear    EKG:  AF.  Inferior T wave inversions.
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
## 2019-07-09 NOTE — H&P ADULT - NSHPREVIEWOFSYSTEMS_GEN_ALL_CORE
Constitutional: denies fever, chills, diaphoresis   HEENT: denies blurry vision, difficulty hearing  Respiratory: +SOB, +MUNOZ, + productive cough, +sputum production, denies wheezing, hemoptysis  Cardiovascular: denies CP, palpitations, edema, orthopnea  Gastrointestinal: +epigastric pain, +chronic constipation, denies nausea, vomiting, diarrhea, melena, hematochezia   Genitourinary: denies dysuria, frequency, urgency, hematuria   Skin/Breast: denies rash, itching  Musculoskeletal: denies myalgias, joint swelling, muscle weakness  Neurologic: denies headache, weakness, dizziness, paresthesias, numbness/tingling  Psychiatric: denies feeling anxious, depressed

## 2019-07-10 DIAGNOSIS — R06.00 DYSPNEA, UNSPECIFIED: ICD-10-CM

## 2019-07-10 LAB
ANION GAP SERPL CALC-SCNC: 8 MMOL/L — SIGNIFICANT CHANGE UP (ref 5–17)
APTT BLD: 45.8 SEC — HIGH (ref 28.5–37)
BASOPHILS # BLD AUTO: 0.18 K/UL — SIGNIFICANT CHANGE UP (ref 0–0.2)
BASOPHILS NFR BLD AUTO: 1 % — SIGNIFICANT CHANGE UP (ref 0–2)
BILIRUB DIRECT SERPL-MCNC: 0.5 MG/DL — HIGH (ref 0.05–0.2)
BILIRUB INDIRECT FLD-MCNC: 1.7 MG/DL — HIGH (ref 0.2–1)
BILIRUB SERPL-MCNC: 2.2 MG/DL — HIGH (ref 0.2–1.2)
BUN SERPL-MCNC: 12 MG/DL — SIGNIFICANT CHANGE UP (ref 7–23)
CALCIUM SERPL-MCNC: 8.9 MG/DL — SIGNIFICANT CHANGE UP (ref 8.5–10.1)
CHLORIDE SERPL-SCNC: 107 MMOL/L — SIGNIFICANT CHANGE UP (ref 96–108)
CK MB BLD-MCNC: 5.7 % — HIGH (ref 0–3.5)
CK MB BLD-MCNC: 6 % — HIGH (ref 0–3.5)
CK MB CFR SERPL CALC: 1.2 NG/ML — SIGNIFICANT CHANGE UP (ref 0–3.6)
CK MB CFR SERPL CALC: 1.2 NG/ML — SIGNIFICANT CHANGE UP (ref 0–3.6)
CK SERPL-CCNC: 20 U/L — LOW (ref 26–192)
CK SERPL-CCNC: 21 U/L — LOW (ref 26–192)
CO2 SERPL-SCNC: 26 MMOL/L — SIGNIFICANT CHANGE UP (ref 22–31)
CREAT SERPL-MCNC: 0.85 MG/DL — SIGNIFICANT CHANGE UP (ref 0.5–1.3)
CULTURE RESULTS: SIGNIFICANT CHANGE UP
EOSINOPHIL # BLD AUTO: 0 K/UL — SIGNIFICANT CHANGE UP (ref 0–0.5)
EOSINOPHIL NFR BLD AUTO: 0 % — SIGNIFICANT CHANGE UP (ref 0–6)
GLUCOSE SERPL-MCNC: 200 MG/DL — HIGH (ref 70–99)
HBA1C BLD-MCNC: 7.7 % — HIGH (ref 4–5.6)
HCT VFR BLD CALC: 41.1 % — SIGNIFICANT CHANGE UP (ref 34.5–45)
HGB BLD-MCNC: 11.6 G/DL — SIGNIFICANT CHANGE UP (ref 11.5–15.5)
INR BLD: 2.27 RATIO — HIGH (ref 0.88–1.16)
LYMPHOCYTES # BLD AUTO: 11 % — LOW (ref 13–44)
LYMPHOCYTES # BLD AUTO: 2.02 K/UL — SIGNIFICANT CHANGE UP (ref 1–3.3)
MCHC RBC-ENTMCNC: 23.9 PG — LOW (ref 27–34)
MCHC RBC-ENTMCNC: 28.2 GM/DL — LOW (ref 32–36)
MCV RBC AUTO: 84.7 FL — SIGNIFICANT CHANGE UP (ref 80–100)
MONOCYTES # BLD AUTO: 0.18 K/UL — SIGNIFICANT CHANGE UP (ref 0–0.9)
MONOCYTES NFR BLD AUTO: 1 % — LOW (ref 2–14)
NEUTROPHILS # BLD AUTO: 15.77 K/UL — HIGH (ref 1.8–7.4)
NEUTROPHILS NFR BLD AUTO: 85 % — HIGH (ref 43–77)
NRBC # BLD: SIGNIFICANT CHANGE UP /100 WBCS (ref 0–0)
PLATELET # BLD AUTO: 243 K/UL — SIGNIFICANT CHANGE UP (ref 150–400)
POTASSIUM SERPL-MCNC: 4.1 MMOL/L — SIGNIFICANT CHANGE UP (ref 3.5–5.3)
POTASSIUM SERPL-SCNC: 4.1 MMOL/L — SIGNIFICANT CHANGE UP (ref 3.5–5.3)
PROCALCITONIN SERPL-MCNC: 0.21 NG/ML — HIGH (ref 0–0.04)
PROTHROM AB SERPL-ACNC: 26.5 SEC — HIGH (ref 10–12.9)
RBC # BLD: 4.85 M/UL — SIGNIFICANT CHANGE UP (ref 3.8–5.2)
RBC # FLD: 18.3 % — HIGH (ref 10.3–14.5)
SODIUM SERPL-SCNC: 141 MMOL/L — SIGNIFICANT CHANGE UP (ref 135–145)
SPECIMEN SOURCE: SIGNIFICANT CHANGE UP
TROPONIN I SERPL-MCNC: <.015 NG/ML — SIGNIFICANT CHANGE UP (ref 0.01–0.04)
TROPONIN I SERPL-MCNC: <.015 NG/ML — SIGNIFICANT CHANGE UP (ref 0.01–0.04)
TSH SERPL-MCNC: 3.34 UIU/ML — SIGNIFICANT CHANGE UP (ref 0.36–3.74)
WBC # BLD: 18.34 K/UL — HIGH (ref 3.8–10.5)
WBC # FLD AUTO: 18.34 K/UL — HIGH (ref 3.8–10.5)

## 2019-07-10 PROCEDURE — 99232 SBSQ HOSP IP/OBS MODERATE 35: CPT

## 2019-07-10 PROCEDURE — 99233 SBSQ HOSP IP/OBS HIGH 50: CPT | Mod: GC

## 2019-07-10 RX ORDER — INSULIN LISPRO 100/ML
3 VIAL (ML) SUBCUTANEOUS ONCE
Refills: 0 | Status: COMPLETED | OUTPATIENT
Start: 2019-07-10 | End: 2019-07-10

## 2019-07-10 RX ORDER — WARFARIN SODIUM 2.5 MG/1
2 TABLET ORAL ONCE
Refills: 0 | Status: COMPLETED | OUTPATIENT
Start: 2019-07-10 | End: 2019-07-10

## 2019-07-10 RX ADMIN — Medication 1: at 21:15

## 2019-07-10 RX ADMIN — Medication 81 MILLIGRAM(S): at 12:11

## 2019-07-10 RX ADMIN — WARFARIN SODIUM 2.5 MILLIGRAM(S): 2.5 TABLET ORAL at 00:53

## 2019-07-10 RX ADMIN — Medication 25 MILLIGRAM(S): at 06:05

## 2019-07-10 RX ADMIN — Medication 100 MILLIGRAM(S): at 12:12

## 2019-07-10 RX ADMIN — PIPERACILLIN AND TAZOBACTAM 25 GRAM(S): 4; .5 INJECTION, POWDER, LYOPHILIZED, FOR SOLUTION INTRAVENOUS at 12:50

## 2019-07-10 RX ADMIN — Medication 2: at 17:44

## 2019-07-10 RX ADMIN — LISINOPRIL 20 MILLIGRAM(S): 2.5 TABLET ORAL at 06:05

## 2019-07-10 RX ADMIN — Medication 1000 UNIT(S): at 12:10

## 2019-07-10 RX ADMIN — PANTOPRAZOLE SODIUM 40 MILLIGRAM(S): 20 TABLET, DELAYED RELEASE ORAL at 08:33

## 2019-07-10 RX ADMIN — Medication 2: at 12:09

## 2019-07-10 RX ADMIN — POLYETHYLENE GLYCOL 3350 17 GRAM(S): 17 POWDER, FOR SOLUTION ORAL at 12:16

## 2019-07-10 RX ADMIN — Medication 1: at 08:33

## 2019-07-10 RX ADMIN — ATORVASTATIN CALCIUM 40 MILLIGRAM(S): 80 TABLET, FILM COATED ORAL at 21:16

## 2019-07-10 RX ADMIN — HYDROXYUREA 500 MILLIGRAM(S): 500 CAPSULE ORAL at 12:10

## 2019-07-10 RX ADMIN — Medication 3 UNIT(S): at 00:53

## 2019-07-10 RX ADMIN — WARFARIN SODIUM 2 MILLIGRAM(S): 2.5 TABLET ORAL at 21:15

## 2019-07-10 RX ADMIN — PIPERACILLIN AND TAZOBACTAM 25 GRAM(S): 4; .5 INJECTION, POWDER, LYOPHILIZED, FOR SOLUTION INTRAVENOUS at 21:13

## 2019-07-10 RX ADMIN — PIPERACILLIN AND TAZOBACTAM 25 GRAM(S): 4; .5 INJECTION, POWDER, LYOPHILIZED, FOR SOLUTION INTRAVENOUS at 06:05

## 2019-07-10 NOTE — PROGRESS NOTE ADULT - SUBJECTIVE AND OBJECTIVE BOX
[facility] -- Elida Douglas, Ayaz Macias Pannella, Patel, Savella  Office # [phone]    Follow Up:  SOB    Subjective/Observations: Seen and evaluated comfortable on RA.  Denies any further respiratory discomfort.  Denies CP or palpitations.  She states, however, had worsening MUNOZ recently    REVIEW OF SYSTEMS: All other review of systems is negative unless indicated above  PAST MEDICAL & SURGICAL HISTORY:  Myeloproliferative disease  Anal fissure  Elevated red blood cell count  Atrial fibrillation: on coumadin  Skin cancer: of back - removed  Type 2 diabetes mellitus: not on insulin  Hypercholesterolemia  HTN (hypertension)  Myocardial infarction: 8 yrs ago  Hearing loss  S/P ORIF (open reduction internal fixation) fracture: right hip pinning  - 5 yrs ago  History of cataract surgery: 03/2016,  06/2016    MEDICATIONS  (STANDING):  aspirin enteric coated 81 milliGRAM(s) Oral daily  atorvastatin 40 milliGRAM(s) Oral at bedtime  cholecalciferol 1000 Unit(s) Oral daily  dextrose 5%. 1000 milliLiter(s) (50 mL/Hr) IV Continuous <Continuous>  dextrose 50% Injectable 12.5 Gram(s) IV Push once  dextrose 50% Injectable 25 Gram(s) IV Push once  dextrose 50% Injectable 25 Gram(s) IV Push once  docusate sodium 100 milliGRAM(s) Oral daily  hydroxyurea 500 milliGRAM(s) Oral daily  insulin lispro (HumaLOG) corrective regimen sliding scale   SubCutaneous three times a day before meals  insulin lispro (HumaLOG) corrective regimen sliding scale   SubCutaneous at bedtime  lisinopril 20 milliGRAM(s) Oral daily  metoprolol succinate ER 25 milliGRAM(s) Oral daily  pantoprazole    Tablet 40 milliGRAM(s) Oral before breakfast  piperacillin/tazobactam IVPB.. 3.375 Gram(s) IV Intermittent every 8 hours  polyethylene glycol 3350 17 Gram(s) Oral daily    MEDICATIONS  (PRN):  dextrose 40% Gel 15 Gram(s) Oral once PRN Blood Glucose LESS THAN 70 milliGRAM(s)/deciliter  glucagon  Injectable 1 milliGRAM(s) IntraMuscular once PRN Glucose LESS THAN 70 milligrams/deciliter    Allergies    No Known Allergies    Intolerances    Vital Signs Last 24 Hrs  T(C): 36.7 (10 Jul 2019 06:02), Max: 36.7 (09 Jul 2019 19:05)  T(F): 98.1 (10 Jul 2019 06:02), Max: 98.1 (10 Jul 2019 06:02)  HR: 71 (10 Jul 2019 06:02) (68 - 79)  BP: 144/78 (10 Jul 2019 06:02) (130/64 - 171/73)  BP(mean): --  RR: 18 (10 Jul 2019 06:02) (18 - 26)  SpO2: 97% (10 Jul 2019 06:02) (96% - 99%)  I&O's Summary    Weight (kg): 55.3 (07-09 @ 16:19)  PHYSICAL EXAM:  TELE: Afib rate controlled  Constitutional: NAD, awake and alert, well-developed  HEENT: Moist Mucous Membranes, Anicteric  Pulmonary: Non-labored, breath sounds are clear bilaterally, No wheezing, rales or rhonchi  Cardiovascular: Irregularly irregular, S1 and S2, + murmurs.  No rubs, gallops or clicks  Gastrointestinal: Bowel Sounds present, soft, nontender.   Lymph: No peripheral edema. No lymphadenopathy.  Skin: No visible rashes or ulcers.  Psych:  Mood & affect appropriate  LABS: All Labs Reviewed:                        12.5   21.64 )-----------( 215      ( 09 Jul 2019 18:04 )             44.8     09 Jul 2019 18:04    140    |  106    |  16     ----------------------------<  209    4.4     |  26     |  0.88     Ca    8.6        09 Jul 2019 18:04  Mg     2.1       09 Jul 2019 18:04    TPro  x      /  Alb  x      /  TBili  2.2    /  DBili  .50    /  AST  x      /  ALT  x      /  AlkPhos  x      09 Jul 2019 23:40  TPro  6.7    /  Alb  3.7    /  TBili  1.8    /  DBili  x      /  AST  12     /  ALT  20     /  AlkPhos  189    09 Jul 2019 18:04    PT/INR - ( 09 Jul 2019 18:04 )   PT: 26.1 sec;   INR: 2.25 ratio    PTT - ( 09 Jul 2019 18:04 )  PTT:46.6 sec  CARDIAC MARKERS ( 09 Jul 2019 23:40 )  <.015 ng/mL / x     / 21 U/L / x     / 1.2 ng/mL  CARDIAC MARKERS ( 09 Jul 2019 18:04 )  <.015 ng/mL / x     / x     / x     / x        < from: CT Chest w/ IV Cont (07.09.19 @ 20:12) >    EXAM:  CT ABDOMEN AND PELVIS OC IC                          EXAM:  CT CHEST IC                          PROCEDURE DATE:  07/09/2019      INTERPRETATION:  CLINICAL INFORMATION: Shortness of breath. Left lower   quadrant abdominal pain and diarrhea.    COMPARISON: None.    PROCEDURE:   CT of the Chest, Abdomen and Pelvis was performed with intravenous   contrast.   Intravenous contrast: 100 ml Omnipaque 350. 0 ml discarded.  Oral contrast: Positive contrast was administered.  Sagittal and coronal reformats were performed.    FINDINGS:    CHEST:     LUNGS AND LARGE AIRWAYS: Patent central airways. Geographic areas of mild   groundglass air space opacity, may reflect air trapping. 3 mm left lower   lobe pulmonary nodule (series 2, image 56). In the absence of known risk   factors, no further routine follow-up is recommended.  PLEURA: No pleural effusion.  VESSELS: Atherosclerosis of the thoracic aorta which is otherwise normal   in caliber.  HEART: Heart size is normal. No pericardialeffusion. Aortic valvular,   coronary artery, and mitral annular calcification.  MEDIASTINUM AND RASTA: Numerous small volume mediastinal and bilateral   hilar nodes.  CHEST WALL AND LOWER NECK: Within normal limits.    ABDOMEN AND PELVIS:    LIVER: Within normal limits.  BILE DUCTS: Normal caliber.  GALLBLADDER: Within normal limits.  SPLEEN: Splenomegaly measuring up to 16.0 cm in the AP dimension..  PANCREAS: Within normal limits.  ADRENALS: Within normal limits.  KIDNEYS/URETERS: Bilateral renal cysts and low-attenuation lesions too   small to accurately characterize. No hydronephrosis.    BLADDER: Within normal limits.  REPRODUCTIVE ORGANS: Uterus and adnexa within normal limits.    BOWEL: No bowel obstruction. Colonic diverticulosis without evidence of   acute diverticulitis. Appendix is normal.  PERITONEUM: No ascites.  VESSELS:  Atherosclerosis of the abdominal aorta and its branch vessels.  RETROPERITONEUM: No lymphadenopathy.    ABDOMINAL WALL: Within normal limits.  BONES: Status post open reduction internal fixation of the right hip.   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 Multilevel degenerative changes   of the spine.    IMPRESSION:     Nonspecific geographic groundglass airspace opacity, suggestive of air   trapping.    Numerous predominantly small volume mediastinal and bilateral hilar   nodes, of unclear etiology.    Colonic diverticulosis without diverticulitis or evidence of acute   inflammation. No bowel obstruction.    Splenomegaly.    ANA NEWTON, ATTENDING RADIOLOGIST  This document has been electronically signed. Jul 9 2019  8:57PM      < end of copied text >    < from: CT Abdomen and Pelvis w/ Oral Cont and w/ IV Cont (07.09.19 @ 20:12) >    EXAM:  CT ABDOMEN AND PELVIS OC IC                          EXAM:  CT CHEST IC                          PROCEDURE DATE:  07/09/2019      INTERPRETATION:  CLINICAL INFORMATION: Shortness of breath. Left lower   quadrant abdominal pain and diarrhea.    COMPARISON: None.    PROCEDURE:   CT of the Chest, Abdomen and Pelvis was performed with intravenous   contrast.   Intravenous contrast: 100 ml Omnipaque 350. 0 ml discarded.  Oral contrast: Positive contrast was administered.  Sagittal and coronal reformats were performed.    FINDINGS:    CHEST:     LUNGS AND LARGE AIRWAYS: Patent central airways. Geographic areas of mild   groundglass air space opacity, may reflect air trapping. 3 mm left lower   lobe pulmonary nodule (series 2, image 56). In the absence of known risk   factors, no further routine follow-up is recommended.  PLEURA: No pleural effusion.  VESSELS: Atherosclerosis of the thoracic aorta which is otherwise normal   in caliber.  HEART: Heart size is normal. No pericardial effusion. Aortic valvular,   coronary artery, and mitral annular calcification.  MEDIASTINUM AND RASTA: Numerous small volume mediastinal and bilateral   hilar nodes.  CHEST WALL AND LOWER NECK: Within normal limits.    ABDOMEN AND PELVIS:    LIVER: Within normal limits.  BILE DUCTS: Normal caliber.  GALLBLADDER: Within normal limits.  SPLEEN: Splenomegaly measuring up to 16.0 cm in the AP dimension..  PANCREAS: Within normal limits.  ADRENALS: Within normal limits.  KIDNEYS/URETERS: Bilateral renal cysts and low-attenuation lesions too   small to accurately characterize. No hydronephrosis.    BLADDER: Within normal limits.  REPRODUCTIVE ORGANS: Uterus and adnexa within normal limits.    BOWEL: No bowel obstruction. Colonic diverticulosis without evidence of   acute diverticulitis. Appendix is normal.  PERITONEUM: No ascites.  VESSELS:  Atherosclerosis of the abdominal aorta and its branch vessels.  RETROPERITONEUM: No lymphadenopathy.    ABDOMINAL WALL: Within normal limits.  BONES: Status post open reduction internal fixation of the right hip.   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 Multilevel degenerative changes   of the spine.    IMPRESSION:     Nonspecific geographic groundglass airspace opacity, suggestive of air   trapping.    Numerous predominantly small volume mediastinal and bilateral hilar   nodes, of unclear etiology.    Colonic diverticulosis without diverticulitis or evidence of acute   inflammation. No bowel obstruction.    Splenomegaly.    STEVENKOHN M.D., ATTENDING RADIOLOGIST  This document has been electronically signed. Jul 9 2019  8:57PM     < end of copied text >    < from: 12 Lead ECG (07.31.17 @ 09:05) >    Ventricular Rate 84 BPM    Atrial Rate 70 BPM    QRS Duration 74 ms    Q-T Interval 386 ms    QTC Calculation(Bezet) 456 ms    R Axis -1 degrees    T Axis -5 degrees    Diagnosis Line Atrial fibrillation Nonspecific ST and T wave abnormality  Abnormal ECG  Confirmed by BERNARDINO VEGA MD ([id number]) on 8/1/2017 8:37:56 AM    < end of copied text >

## 2019-07-10 NOTE — PROGRESS NOTE ADULT - PROBLEM SELECTOR PLAN 5
- Started low dose insulin sliding scale  - HgA1c  - accuchecks  - POCT - chronic, stable  -  Started low dose insulin sliding scale  - HgA1c  - accuchecks  - POCT

## 2019-07-10 NOTE — DIETITIAN INITIAL EVALUATION ADULT. - PROBLEM SELECTOR PLAN 2
R/o ischemic heart disease. Pt with known occluded RCA , moderate disease in LCX and LAD with positive nuclear stress test. Low suspicion for PE as patient therapeutically anticoagulated.  - Trops neg x1. Continue to trend  - Supplemental O2 prn  - CT chest showed Nonspecific geographic groundglass airspace opacity, suggestive of air trapping. Numerous predominantly small volume mediastinal and bilateral hilar nodes, of unclear etiology.  - consider pulm consult if sob worsens  - Check TSH  - Cardio- Dr. Rosela consulted

## 2019-07-10 NOTE — PATIENT PROFILE ADULT - NSPROIMPLANTSMEDDEV_GEN_A_NUR
Lens implant/Artificial joint Artificial joint/Lens implant/Vascular stents/Clips/hx right hip replacement and hx of cataract surgery/Urinary sphincter

## 2019-07-10 NOTE — DIETITIAN INITIAL EVALUATION ADULT. - PROBLEM SELECTOR PLAN 4
HR stable  - Continue to monitor on telemetry  - Continue Toprol XL 25mg qd with hold parameters  - Pt takes Coumadin 2 mg MWF and 3 mg T, Th, Sa, Su. INR therapeutic 2.25.  Will give 2.5mg tonight (as per coumadin initiative) to maintain INR between 2-3.

## 2019-07-10 NOTE — CONSULT NOTE ADULT - SUBJECTIVE AND OBJECTIVE BOX
[facility]  Tyler Meeks PA-C  [address] [place] [address]  [phone]      Chief Complaint:  Patient is a 89y old  Female who presents with a chief complaint of sob/abdominal pain (10 Jul 2019 07:26)      HPI: 89F w/pmh of Afib on coumadin, Myeloproliferative disorder on hydroxyurea, HTN, HLD, CAD s/p prior MI (8 years ago) with a known chronically occluded RCA, moderate disease in LCX and LAD managed medically, normal LV and Type 2 DM not on insulin presenting with episode of dyspnea on exertion and abdominal pain this morning. States that she generally has shortness of breath with exertion but the abdominal discomfort was new today. Admits to productive cough. Difficult for her to describe abdominal pain but states that it was epigastric, non-radiating and felt as if she needed to go to the bathroom but could not. She has a history of constipation and admits to recent diarrhea but takes miralax and colace daily. Last bowel movement this morning. Denies eating anything out of the ordinary. Also admits that she felt alone this morning when these symptoms occurred. Denies headache, fevers/chills, n/v, chest pain, palpitations, diarrhea, dysuria, hematuria, edema or weakness. Denies recent use of steroids. Denies recent falls, sick contacts or recent travel. Of note, patient's symptoms improved upon arrival to ER. Unknown last colonoscopy.     Allergies:  No Known Allergies      Medications:  aspirin enteric coated 81 milliGRAM(s) Oral daily  atorvastatin 40 milliGRAM(s) Oral at bedtime  cholecalciferol 1000 Unit(s) Oral daily  dextrose 40% Gel 15 Gram(s) Oral once PRN  dextrose 5%. 1000 milliLiter(s) IV Continuous <Continuous>  dextrose 50% Injectable 12.5 Gram(s) IV Push once  dextrose 50% Injectable 25 Gram(s) IV Push once  dextrose 50% Injectable 25 Gram(s) IV Push once  docusate sodium 100 milliGRAM(s) Oral daily  glucagon  Injectable 1 milliGRAM(s) IntraMuscular once PRN  hydroxyurea 500 milliGRAM(s) Oral daily  insulin lispro (HumaLOG) corrective regimen sliding scale   SubCutaneous three times a day before meals  insulin lispro (HumaLOG) corrective regimen sliding scale   SubCutaneous at bedtime  lisinopril 20 milliGRAM(s) Oral daily  metoprolol succinate ER 25 milliGRAM(s) Oral daily  pantoprazole    Tablet 40 milliGRAM(s) Oral before breakfast  piperacillin/tazobactam IVPB.. 3.375 Gram(s) IV Intermittent every 8 hours  polyethylene glycol 3350 17 Gram(s) Oral daily  warfarin 2 milliGRAM(s) Oral once      PMHX/PSHX:  Myeloproliferative disease  Anal fissure  Elevated red blood cell count  Atrial fibrillation  Skin cancer  Type 2 diabetes mellitus  Hypercholesterolemia  HTN (hypertension)  Myocardial infarction  Hearing loss  S/P ORIF (open reduction internal fixation) fracture  History of cataract surgery      Family history:  Family history of cancer (Sibling)  Family history of heart failure  Family history of cancer      Social History:     ROS:     General:  No wt loss, fevers, chills, night sweats, fatigue,   Eyes:  Good vision, no reported pain  ENT:  No sore throat, pain, runny nose, dysphagia  CV:  No pain, palpitations, hypo/hypertension  Resp:  No dyspnea, cough, tachypnea, wheezing  GI:  No pain, No nausea, No vomiting, No diarrhea, No constipation, No weight loss, No fever, No pruritis, No rectal bleeding, No tarry stools, No dysphagia,  :  No pain, bleeding, incontinence, nocturia  Muscle:  No pain, weakness  Neuro:  No weakness, tingling, memory problems  Psych:  No fatigue, insomnia, mood problems, depression  Endocrine:  No polyuria, polydipsia, cold/heat intolerance  Heme:  No petechiae, ecchymosis, easy bruisability  Skin:  No rash, tattoos, scars, edema      PHYSICAL EXAM:   Vital Signs:  Vital Signs Last 24 Hrs  T(C): 30.7 (10 Jul 2019 12:38), Max: 36.8 (10 Jul 2019 10:20)  T(F): 87.3 (10 Jul 2019 12:38), Max: 98.2 (10 Jul 2019 10:20)  HR: 83 (10 Jul 2019 12:38) (61 - 83)  BP: 171/76 (10 Jul 2019 12:38) (130/64 - 171/76)  BP(mean): --  RR: 18 (10 Jul 2019 12:38) (18 - 26)  SpO2: 94% (10 Jul 2019 12:38) (94% - 99%)  Daily Height in cm: 162.56 (2019 16:19)    Daily     GENERAL:  Appears stated age, well-groomed, well-nourished, no distress  HEENT:  NC/AT,  conjunctivae clear and pink, no thyromegaly, nodules, adenopathy, no JVD, sclera -anicteric  CHEST:  Full & symmetric excursion, no increased effort, breath sounds clear  HEART:  Regular rhythm, S1, S2, no murmur/rub/S3/S4, no abdominal bruit, no edema  ABDOMEN:  Soft, non-tender, non-distended, normoactive bowel sounds,  no masses ,no hepato-splenomegaly, no signs of chronic liver disease  EXTEREMITIES:  no cyanosis,clubbing or edema  SKIN:  No rash/erythema/ecchymoses/petechiae/wounds/abscess/warm/dry  NEURO:  Alert, oriented, no asterixis, no tremor, no encephalopathy    LABS:                        11.6   18.34 )-----------( 243      ( 10 Jul 2019 07:23 )             41.1     07-10    141  |  107  |  12  ----------------------------<  200<H>  4.1   |  26  |  0.85    Ca    8.9      10 Jul 2019 07:23  Mg     2.1     07-09    TPro  x   /  Alb  x   /  TBili  2.2<H>  /  DBili  .50<H>  /  AST  x   /  ALT  x   /  AlkPhos  x   07-09    LIVER FUNCTIONS - ( 2019 18:04 )  Alb: 3.7 g/dL / Pro: 6.7 g/dL / ALK PHOS: 189 U/L / ALT: 20 U/L / AST: 12 U/L / GGT: x           PT/INR - ( 10 Jul 2019 07:23 )   PT: 26.5 sec;   INR: 2.27 ratio         PTT - ( 10 Jul 2019 07:23 )  PTT:45.8 sec  Urinalysis Basic - ( 2019 21:44 )    Color: Yellow / Appearance: Clear / S.000 / pH: x  Gluc: x / Ketone: Negative  / Bili: Negative / Urobili: Negative   Blood: x / Protein: Negative / Nitrite: Positive   Leuk Esterase: Trace / RBC: 0-2 /HPF / WBC 3-5   Sq Epi: x / Non Sq Epi: Occasional / Bacteria: Occasional      Amylase Serum--      Lipase [id number]       Ammonia--      Imaging:

## 2019-07-10 NOTE — PROGRESS NOTE ADULT - ASSESSMENT
89F w/pmh of Afib on coumadin, Myeloproliferative disorder on hydroxyurea, HTN, HLD, CAD s/p prior MI (8 years ago) with a known chronically occluded RCA, moderate disease in LCX and LAD managed medically, normal LV and Type 2 DM not on insulin presenting with episode of dyspnea on exertion and abdominal pain admitted with abdominal pain with possible diverticulitis, found to have leukocytosis. Also with sob, r/o ischemic heart disease. 89F w/pmh of Afib on coumadin, Myeloproliferative disorder on hydroxyurea, HTN, HLD, CAD s/p prior MI (8 years ago) with a known chronically occluded RCA, moderate disease in LCX and LAD managed medically, normal LV and Type 2 DM not on insulin presenting with episode of dyspnea on exertion and abdominal pain admitted with diverticulosis, sirs and worsening dyspnea on exertion sec to suspect CHF

## 2019-07-10 NOTE — CONSULT NOTE ADULT - SUBJECTIVE AND OBJECTIVE BOX
Date/Time Patient Seen:  		  Referring MD:   Data Reviewed	       Patient is a 89y old  Female who presents with a chief complaint of sob/abdominal pain (10 Jul 2019 13:07)      Subjective/HPI     PAST MEDICAL & SURGICAL HISTORY:  Myeloproliferative disease  Anal fissure  Elevated red blood cell count  Atrial fibrillation: on coumadin  Skin cancer: of back - removed  Type 2 diabetes mellitus: not on insulin  Hypercholesterolemia  HTN (hypertension)  Myocardial infarction: 8 yrs ago  Hearing loss  S/P ORIF (open reduction internal fixation) fracture: right hip pinning  - 5 yrs ago  History of cataract surgery: 03/2016,  06/2016        Medication list         MEDICATIONS  (STANDING):  aspirin enteric coated 81 milliGRAM(s) Oral daily  atorvastatin 40 milliGRAM(s) Oral at bedtime  cholecalciferol 1000 Unit(s) Oral daily  dextrose 5%. 1000 milliLiter(s) (50 mL/Hr) IV Continuous <Continuous>  dextrose 50% Injectable 12.5 Gram(s) IV Push once  dextrose 50% Injectable 25 Gram(s) IV Push once  dextrose 50% Injectable 25 Gram(s) IV Push once  docusate sodium 100 milliGRAM(s) Oral daily  hydroxyurea 500 milliGRAM(s) Oral daily  insulin lispro (HumaLOG) corrective regimen sliding scale   SubCutaneous three times a day before meals  insulin lispro (HumaLOG) corrective regimen sliding scale   SubCutaneous at bedtime  lisinopril 20 milliGRAM(s) Oral daily  metoprolol succinate ER 25 milliGRAM(s) Oral daily  pantoprazole    Tablet 40 milliGRAM(s) Oral before breakfast  piperacillin/tazobactam IVPB.. 3.375 Gram(s) IV Intermittent every 8 hours  polyethylene glycol 3350 17 Gram(s) Oral daily  warfarin 2 milliGRAM(s) Oral once    MEDICATIONS  (PRN):  dextrose 40% Gel 15 Gram(s) Oral once PRN Blood Glucose LESS THAN 70 milliGRAM(s)/deciliter  glucagon  Injectable 1 milliGRAM(s) IntraMuscular once PRN Glucose LESS THAN 70 milligrams/deciliter         Vitals log        ICU Vital Signs Last 24 Hrs  T(C): 30.7 (10 Jul 2019 12:38), Max: 36.8 (10 Jul 2019 10:20)  T(F): 87.3 (10 Jul 2019 12:38), Max: 98.2 (10 Jul 2019 10:20)  HR: 83 (10 Jul 2019 12:38) (61 - 83)  BP: 171/76 (10 Jul 2019 12:38) (130/64 - 171/76)  BP(mean): --  ABP: --  ABP(mean): --  RR: 18 (10 Jul 2019 12:38) (18 - 26)  SpO2: 94% (10 Jul 2019 12:38) (94% - 99%)           Input and Output:  I&O's Detail      Lab Data                        11.6   18.34 )-----------( 243      ( 10 Jul 2019 07:23 )             41.1     07-10    141  |  107  |  12  ----------------------------<  200<H>  4.1   |  26  |  0.85    Ca    8.9      10 Jul 2019 07:23  Mg     2.1     07-09    TPro  x   /  Alb  x   /  TBili  2.2<H>  /  DBili  .50<H>  /  AST  x   /  ALT  x   /  AlkPhos  x   07-09      CARDIAC MARKERS ( 10 Jul 2019 07:23 )  <.015 ng/mL / x     / 20 U/L / x     / 1.2 ng/mL  CARDIAC MARKERS ( 09 Jul 2019 23:40 )  <.015 ng/mL / x     / 21 U/L / x     / 1.2 ng/mL  CARDIAC MARKERS ( 09 Jul 2019 18:04 )  <.015 ng/mL / x     / x     / x     / x            Review of Systems	      Objective     Physical Examination        Pertinent Lab findings & Imaging      Silva:  NO   Adequate UO     I&O's Detail           Discussed with:     Cultures:	        Radiology          EXAM:  CT ABDOMEN AND PELVIS OC IC                          EXAM:  CT CHEST IC                            PROCEDURE DATE:  07/09/2019          INTERPRETATION:  CLINICAL INFORMATION: Shortness of breath. Left lower   quadrant abdominal pain and diarrhea.    COMPARISON: None.    PROCEDURE:   CT of the Chest, Abdomen and Pelvis was performed with intravenous   contrast.   Intravenous contrast: 100 ml Omnipaque 350. 0 ml discarded.  Oral contrast: Positive contrast was administered.  Sagittal and coronal reformats were performed.    FINDINGS:    CHEST:     LUNGS AND LARGE AIRWAYS: Patent central airways. Geographic areas of mild   groundglass air space opacity, may reflect air trapping. 3 mm left lower   lobe pulmonary nodule (series 2, image 56). In the absence of known risk   factors, no further routine follow-up is recommended.  PLEURA: No pleural effusion.  VESSELS: Atherosclerosis of the thoracic aorta which is otherwise normal   in caliber.  HEART: Heart size is normal. No pericardial effusion. Aortic valvular,   coronary artery, and mitral annular calcification.  MEDIASTINUM AND RASTA: Numerous small volume mediastinal and bilateral   hilar nodes.  CHEST WALL AND LOWER NECK: Within normal limits.    ABDOMEN AND PELVIS:    LIVER: Within normal limits.  BILE DUCTS: Normal caliber.  GALLBLADDER: Within normal limits.  SPLEEN: Splenomegaly measuring up to 16.0 cm in the AP dimension..  PANCREAS: Within normal limits.  ADRENALS: Within normal limits.  KIDNEYS/URETERS: Bilateral renal cysts and low-attenuation lesions too   small to accurately characterize. No hydronephrosis.    BLADDER: Within normal limits.  REPRODUCTIVE ORGANS: Uterus and adnexa within normal limits.    BOWEL: No bowel obstruction. Colonic diverticulosis without evidence of   acute diverticulitis. Appendix is normal.  PERITONEUM: No ascites.  VESSELS:  Atherosclerosis of the abdominal aorta and its branch vessels.  RETROPERITONEUM: No lymphadenopathy.    ABDOMINAL WALL: Within normal limits.  BONES: Status post open reduction internal fixation of the right hip.   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 Multilevel degenerative changes   of the spine.    IMPRESSION:     Nonspecific geographic groundglass airspace opacity, suggestive of air   trapping.    Numerous predominantly small volume mediastinal and bilateral hilar   nodes, of unclear etiology.    Colonic diverticulosis without diverticulitis or evidence of acute   inflammation. No bowel obstruction.    Splenomegaly.                    RISSA GRACE M.D., ATTENDING RADIOLOGIST  This document has been electronically signed. Jul 9 2019  8:57PM

## 2019-07-10 NOTE — ED ADULT NURSE REASSESSMENT NOTE - NS ED NURSE REASSESS COMMENT FT1
pt transported to CT scan via stretcher
Report taken from Nya OSORIO RN. Pt received alert and oriented x 4, denies any pain at this time. Stage 1 noted to sacrum. Awaiting bed. F/S 366; 3 units lispro given. VSS. 20G IV NOTED TO RAC.

## 2019-07-10 NOTE — PROGRESS NOTE ADULT - PROBLEM SELECTOR PLAN 7
As per son, patient takes Hydroxyurea 500mg qd for elevated RBC count  - RBC stable   - Pt sees Heme/onc, Dr. Burgess. Consider consulting if leukocytosis persists without source - chronic  - As per son, patient takes Hydroxyurea 500mg qd for elevated RBC count  - RBC stable   - Pt sees Heme/onc, Dr. Burgess. Consider consulting if leukocytosis persists without source

## 2019-07-10 NOTE — PROGRESS NOTE ADULT - ASSESSMENT
This is a 89 year old woman with a history of CAD s/p prior MI with a known chronically occluded RCA, moderate disease in the LCX and LAD that has been managed medically, normal LV function, hypertension, AF on AC with Coumadin, DM who has been having increased dyspnea and presents to the ED with respiratory distress today.    MUNOZ  - Unclear etiology of her dyspnea.  Though, her pro-BNP is elevated at >2500, she is clinically euvolemic.  Her dyspnea resolved in the ED with no specific intervention  - This could be her angina equivalent.  If this is related to ischemic heart disease, as she did have a positive nuclear stress test that we elected to manage medically  - Admit to telemetry  - Her CE's x 2 sets are negative  No further need to ternd  - CT chest not reflective of volume overload/pleural effusion, however, groundglass opacity noted  - Her CxR is unremarkable    CAD  - No evidence of acute ischemia per EKG  - Continue ASA, BB, ACEI, and statin  - No further cardiac enzymes needed at this point  - IF no other etiology found, will consider coronary angiography.  Discussed with patient and son    Atrial fibrillation  - Her rate is controlled  - Dose Coumadin for goal INR 2-3.  Will need to be held if we choose to send for cath  - Continue Toprol 25 QD  - Monitor and replete lytes    HTN  - Continue BB and ACEI    Further cardiac workup pending clinical cours  To follow closely with you    [facility]  Cardiology   Spectra #[phone]

## 2019-07-10 NOTE — ED ADULT NURSE REASSESSMENT NOTE - INTEGUMENTARY WDL
Color consistent with ethnicity/race, warm, dry intact, resilient. Non blanchable redness noted to sacrum.

## 2019-07-10 NOTE — DIETITIAN INITIAL EVALUATION ADULT. - PROBLEM SELECTOR PLAN 1
Admit to telemetry  - CT A/P showed colonic diverticulosis without diverticulitis or evidence of acute   inflammation. No bowel obstruction.  -Pain may be due to constipation in the setting of diverticulosis. Pain improved in hospital. Low suspicion for cholecystitis vs. uti vs. acid reflux.  - s/p Zosyn in ER. Continue Zosyn   - Follow up blood and urine cultures  - T. bili elevated. CT showed normal gallbladder. f/u fractionated bilirubin  - UA with trace LE and positive nitrites, occasional bacteria. Pt asymptomatic  - CLD  - PPI 40mg qd  - f/u procalcitonin  - For chronic constipation, continue colace and miralax  - GI consult- Dr. Ventura consulted

## 2019-07-10 NOTE — CONSULT NOTE ADULT - PROBLEM SELECTOR RECOMMENDATION 9
all abdominal symptoms resolved   no concerning pathology seen on CT   may advance diet as tolerated  monitor abdominal exam  no need for colonoscopy

## 2019-07-10 NOTE — PROGRESS NOTE ADULT - SUBJECTIVE AND OBJECTIVE BOX
Patient is a 89y old  Female who presents with a chief complaint of sob/abdominal pain (10 Jul 2019 15:02)    HPI:  89F w/pmh of Afib on coumadin, Myeloproliferative disorder on hydroxyurea, HTN, HLD, CAD s/p prior MI (8 years ago) with a known chronically occluded RCA, moderate disease in LCX and LAD managed medically, normal LV and Type 2 DM not on insulin presenting with episode of dyspnea on exertion and abdominal pain this morning. States that she generally has shortness of breath with exertion but the abdominal discomfort was new today. Admits to productive cough. Difficult for her to describe abdominal pain but states that it was epigastric, non-radiating and felt as if she needed to go to the bathroom but could not. She has a history of constipation and admits to recent diarrhea but takes miralax and colace daily. Last bowel movement this morning. Denies eating anything out of the ordinary. Also admits that she felt alone this morning when these symptoms occurred. Denies headache, fevers/chills, n/v, chest pain, palpitations, diarrhea, dysuria, hematuria, edema or weakness. Denies recent use of steroids. Denies recent falls, sick contacts or recent travel. Of note, patient's symptoms improved upon arrival to ER. Unknown last colonoscopy.     In the ED, patient's vitals were: T97.9F, HR 76, /73, RR 26 and SpO2 99% on room air. Significant labs include: WBC 21.64, INR 2.25, Tbili 1.8, alk phos 189, trops neg x1, proBNP 2567.   CT A/P: Nonspecific geographic groundglass airspace opacity, suggestive of air   trapping. Numerous predominantly small volume mediastinal and bilateral hilar   nodes, of unclear etiology. Colonic diverticulosis without diverticulitis or evidence of acute inflammation. No bowel obstruction. Splenomegaly.  CXR: stable chest  Pt given Zosyn x1.   EKG: Afib, HR 68, T wave inversion in V1    U/A shows +nitrite (2019 22:09)    -----------------------------------------------  INTERVAL HPI/OVERNIGHT EVENTS:  -----------------------------------------------  Patient examined at bedside in ED, with daughter present. Patient denies chest pain, N/V/D at this time. Patient admits to some abdominal pain. Patient denies dyspnea currently at rest. Advanced life directives were discussed with patient and daughter. INR is 2.27, Coumadin given 2mg M/W/F, and 3mg Tue/Thur/Sa/Sun.    T(C): 30.7 (07-10-19 @ 12:38), Max: 36.8 (07-10-19 @ 10:20)  HR: 83 (07-10-19 @ 12:38) (61 - 83)  BP: 171/76 (07-10-19 @ 12:38) (130/64 - 171/76)  RR: 18 (07-10-19 @ 12:38) (18 - 26)  SpO2: 94% (07-10-19 @ :38) (94% - 99%)  Wt(kg): --  I&O's Summary    ----------------------------  REVIEW OF SYSTEMS:  CONSTITUTIONAL: No fever, fatigue  EYES: No visual disturbances  ENMT:  No difficulty hearing; No throat pain  RESPIRATORY: No shortness of breath at rest. No cough, wheezing, chills or hemoptysis  CARDIOVASCULAR: No chest pain, palpitations.  GASTROINTESTINAL: No nausea, vomiting, or hematemesis. Admits to some abdominal pain.  GENITOURINARY: No dysuria, frequency, hematuria, or incontinence  NEUROLOGICAL: No headaches, dizziness, numbness, or tremors  MUSCULOSKELETAL: No joint pain or swelling;     ----------------------  PHYSICAL EXAM:  GENERAL: NAD, well-groomed, well-developed  HEAD:  Atraumatic, Normocephalic  NERVOUS SYSTEM:  Alert & Oriented X3  CHEST/LUNG: Clear to ausculation bilaterally; No rales, rhonchi, wheezing, or rubs  HEART: Regular rate and rhythm; No murmurs, rubs, or gallops  ABDOMEN: Soft, Bowel sounds present. LLQ elicited pain upon palpation, all other quadrants were nontender.  EXTREMITIES:   No clubbing, cyanosis, or edema  LYMPH: No lymphadenopathy noted  SKIN: No rashes or lesions    ----------------------  MEDICATIONS  (STANDING):  aspirin enteric coated 81 milliGRAM(s) Oral daily  atorvastatin 40 milliGRAM(s) Oral at bedtime  cholecalciferol 1000 Unit(s) Oral daily  dextrose 5%. 1000 milliLiter(s) (50 mL/Hr) IV Continuous <Continuous>  dextrose 50% Injectable 12.5 Gram(s) IV Push once  dextrose 50% Injectable 25 Gram(s) IV Push once  dextrose 50% Injectable 25 Gram(s) IV Push once  docusate sodium 100 milliGRAM(s) Oral daily  hydroxyurea 500 milliGRAM(s) Oral daily  insulin lispro (HumaLOG) corrective regimen sliding scale   SubCutaneous three times a day before meals  insulin lispro (HumaLOG) corrective regimen sliding scale   SubCutaneous at bedtime  lisinopril 20 milliGRAM(s) Oral daily  metoprolol succinate ER 25 milliGRAM(s) Oral daily  pantoprazole    Tablet 40 milliGRAM(s) Oral before breakfast  piperacillin/tazobactam IVPB.. 3.375 Gram(s) IV Intermittent every 8 hours  polyethylene glycol 3350 17 Gram(s) Oral daily  warfarin 2 milliGRAM(s) Oral once    MEDICATIONS  (PRN):  dextrose 40% Gel 15 Gram(s) Oral once PRN Blood Glucose LESS THAN 70 milliGRAM(s)/deciliter  glucagon  Injectable 1 milliGRAM(s) IntraMuscular once PRN Glucose LESS THAN 70 milligrams/deciliter      --------  LABS:                        11.6   18.34 )-----------( 243      ( 10 Jul 2019 07:23 )             41.1     07-10    141  |  107  |  12  ----------------------------<  200<H>  4.1   |  26  |  0.85    Ca    8.9      10 Jul 2019 07:23  Mg     2.1     07-09    TPro  x   /  Alb  x   /  TBili  2.2<H>  /  DBili  .50<H>  /  AST  x   /  ALT  x   /  AlkPhos  x   07-    PT/INR - ( 10 Jul 2019 07:23 )   PT: 26.5 sec;   INR: 2.27 ratio         PTT - ( 10 Jul 2019 07:23 )  PTT:45.8 sec  Urinalysis Basic - ( 2019 21:44 )    Color: Yellow / Appearance: Clear / S.000 / pH: x  Gluc: x / Ketone: Negative  / Bili: Negative / Urobili: Negative   Blood: x / Protein: Negative / Nitrite: Positive   Leuk Esterase: Trace / RBC: 0-2 /HPF / WBC 3-5   Sq Epi: x / Non Sq Epi: Occasional / Bacteria: Occasional      CAPILLARY BLOOD GLUCOSE      POCT Blood Glucose.: 250 mg/dL (10 Jul 2019 11:46)  POCT Blood Glucose.: 176 mg/dL (10 Jul 2019 07:51)  POCT Blood Glucose.: 366 mg/dL (10 Jul 2019 00:42)    ----------------------------------------------  RADIOLOGY & ADDITIONAL TESTS:  < from: CT Chest w/ IV Cont (19 @ 20:12) >  IMPRESSION:     Nonspecific geographic groundglass airspace opacity, suggestive of air   trapping.    Numerous predominantly small volume mediastinal and bilateral hilar   nodes, of unclear etiology.    Colonic diverticulosis without diverticulitis or evidence of acute   inflammation. No bowel obstruction.    Splenomegaly.    < end of copied text >

## 2019-07-10 NOTE — GOALS OF CARE CONVERSATION - PERSONAL ADVANCE DIRECTIVE - CONVERSATION DETAILS
met pt with son, Chuy, pt [place], pt has hcp but unable to locate at this time: pt consented to completing hcp, son, Chuy is hcp. further discussion of pt directives: pt wants resuscitation, leaves to son further decisions , son will make decisions based on quality of life, as circumstances arise. pt is full code at this time.  PC RN contact # given.

## 2019-07-10 NOTE — PROGRESS NOTE ADULT - PROBLEM SELECTOR PLAN 1
- CT A/P showed colonic diverticulosis without diverticulitis or evidence of acute inflammation. No bowel obstruction.  - Continue Zosyn.  - Elevated T. jeb. CT showed normal gallbladder. Sandoval sign negative on physical exam. Low suspicion for cholecystitis or other gallbladder pathology.  - Procalcitonin 0.021  - UA with trace LE, + nitrites, occasional bacteria. Asymptomatic.  - PPI 40mg qd  - CLD  - Chronic constipation: Continue colace and miralax  - GI consult- Dr. Mckinney consult appreciated. - acute, resolving   -CT A/P showed colonic diverticulosis without diverticulitis or evidence of acute inflammation. No bowel obstruction.  - Continue Zosyn.  - Elevated T. jeb. CT showed normal gallbladder. Sandoval sign negative on physical exam. Low suspicion for cholecystitis or other gallbladder pathology.  - Procalcitonin 0.021  - UA with trace LE, + nitrites, occasional bacteria. Asymptomatic.  - PPI 40mg qd  - CLD  - Chronic constipation: Continue colace and miralax  - GI consult- Dr. Mckinney consult appreciated.

## 2019-07-10 NOTE — PROGRESS NOTE ADULT - PROBLEM SELECTOR PLAN 8
Elevated in ER. Likely 2/2 abdominal pain, sob or consider anxiety componenet  - Continue Ramipril with therapeutic interchange to Lisinopril 20mg qd  - Continue Metoprolol 25mg qd -chronic, stable  -Likely 2/2 abdominal pain, sob or consider anxiety componenet  - Continue Ramipril with therapeutic interchange to Lisinopril 20mg qd  - Continue Metoprolol 25mg qd

## 2019-07-10 NOTE — PROGRESS NOTE ADULT - PROBLEM SELECTOR PLAN 2
- R/o ischemic heart disease. Pt with known occluded RCA , moderate disease in LCX and LAD with positive nuclear stress test. Low suspicion for PE as patient therapeutically anticoagulated.  - Trops negative x 3  -  TSH normal   - Supplemental O2  - CT chest: Ground Tor airspace opacity, suggestive of air trapping. Numerous predominantly small volume mediastinal and bilateral hilar nodes, of unclear etiology. - suspected chf as worsened with exertion  - R/o ischemic heart disease. Pt with known occluded RCA , moderate disease in LCX and LAD with positive nuclear stress test. Low suspicion for PE as patient therapeutically anticoagulated.  - Trops negative x 3  -  TSH normal   - Supplemental O2  - CT chest: Ground Tor airspace opacity, suggestive of air trapping. Numerous predominantly small volume mediastinal and bilateral hilar nodes, of unclear etiology.  -apprec pulm recs Dr. Sheikh

## 2019-07-10 NOTE — PROGRESS NOTE ADULT - PROBLEM SELECTOR PLAN 4
HR stable  - Continue to monitor on telemetry  - Continue Toprol XL 25mg qd with hold parameters  - Pt takes Coumadin 2 mg MWF and 3 mg T, Th, Sa, Su. INR therapeutic 2.25.  - 7/10, Will give 2.5mg tonight (as per coumadin initiative) to maintain INR between 2-3. -chronic  - Continue to monitor on telemetry  - Continue Toprol XL 25mg qd with hold parameters  - Pt takes Coumadin 2 mg MWF and 3 mg T, Th, Sa, Su. INR therapeutic 2.25.  - 7/10, Will give 2.5mg tonight (as per coumadin initiative) to maintain INR between 2-3.

## 2019-07-10 NOTE — DIETITIAN INITIAL EVALUATION ADULT. - OTHER INFO
89F w/pmh of Afib on coumadin, Myeloproliferative disorder on hydroxyurea, HTN, HLD, CAD s/p prior MI (8 years ago) with a known chronically occluded RCA, moderate disease in LCX and LAD managed medically, normal LV and Type 2 DM not on insulin presenting with episode of dyspnea on exertion and abdominal pain this morning.  CT ab reveals no acute pathology.  Symptoms largely resolved.  Pt seen alert, up in bed post lunch.  100% meal eaten.  Pt reports eats well, 3 meals + snacks.  Pt follows Kosher diet loosely, willing to accept facility served non Kosher breakfast.   Pt admits to ~10# wt loss but cannot discern the time frame.  Has been very gradual over past ? 3 years since she had her rectal surgery per pt.  Family does food shopping for her, she is able to prepare her meals.  Pt admits to not being able to keep house as well as a year ago.

## 2019-07-10 NOTE — DIETITIAN INITIAL EVALUATION ADULT. - PROBLEM SELECTOR PLAN 7
As per son, patient takes Hydroxyurea 500mg qd for elevated RBC count  - RBC stable   - Pt sees Heme/onc, Dr. Burgess. Consider consulting if leukocytosis persists without source

## 2019-07-10 NOTE — PATIENT PROFILE ADULT - NSPROHMDIABETMGMTSTRAT_GEN_A_NUR
adequate rest/diet modification/routine screenings/activity/blood glucose testing/medication therapy

## 2019-07-11 DIAGNOSIS — R06.09 OTHER FORMS OF DYSPNEA: ICD-10-CM

## 2019-07-11 LAB
ANION GAP SERPL CALC-SCNC: 8 MMOL/L — SIGNIFICANT CHANGE UP (ref 5–17)
BUN SERPL-MCNC: 13 MG/DL — SIGNIFICANT CHANGE UP (ref 7–23)
CALCIUM SERPL-MCNC: 8.6 MG/DL — SIGNIFICANT CHANGE UP (ref 8.5–10.1)
CHLORIDE SERPL-SCNC: 104 MMOL/L — SIGNIFICANT CHANGE UP (ref 96–108)
CO2 SERPL-SCNC: 25 MMOL/L — SIGNIFICANT CHANGE UP (ref 22–31)
CREAT SERPL-MCNC: 0.96 MG/DL — SIGNIFICANT CHANGE UP (ref 0.5–1.3)
GLUCOSE SERPL-MCNC: 347 MG/DL — HIGH (ref 70–99)
HCT VFR BLD CALC: 41.4 % — SIGNIFICANT CHANGE UP (ref 34.5–45)
HGB BLD-MCNC: 11.7 G/DL — SIGNIFICANT CHANGE UP (ref 11.5–15.5)
INR BLD: 2.36 RATIO — HIGH (ref 0.88–1.16)
MCHC RBC-ENTMCNC: 24.2 PG — LOW (ref 27–34)
MCHC RBC-ENTMCNC: 28.3 GM/DL — LOW (ref 32–36)
MCV RBC AUTO: 85.7 FL — SIGNIFICANT CHANGE UP (ref 80–100)
NRBC # BLD: 0 /100 WBCS — SIGNIFICANT CHANGE UP (ref 0–0)
PLATELET # BLD AUTO: 213 K/UL — SIGNIFICANT CHANGE UP (ref 150–400)
POTASSIUM SERPL-MCNC: 4.4 MMOL/L — SIGNIFICANT CHANGE UP (ref 3.5–5.3)
POTASSIUM SERPL-SCNC: 4.4 MMOL/L — SIGNIFICANT CHANGE UP (ref 3.5–5.3)
PROTHROM AB SERPL-ACNC: 27.4 SEC — HIGH (ref 10–12.9)
RBC # BLD: 4.83 M/UL — SIGNIFICANT CHANGE UP (ref 3.8–5.2)
RBC # FLD: 18.5 % — HIGH (ref 10.3–14.5)
SODIUM SERPL-SCNC: 137 MMOL/L — SIGNIFICANT CHANGE UP (ref 135–145)
WBC # BLD: 20.6 K/UL — HIGH (ref 3.8–10.5)
WBC # FLD AUTO: 20.6 K/UL — HIGH (ref 3.8–10.5)

## 2019-07-11 PROCEDURE — 93306 TTE W/DOPPLER COMPLETE: CPT | Mod: 26

## 2019-07-11 PROCEDURE — 99233 SBSQ HOSP IP/OBS HIGH 50: CPT

## 2019-07-11 PROCEDURE — 99233 SBSQ HOSP IP/OBS HIGH 50: CPT | Mod: GC

## 2019-07-11 RX ORDER — WARFARIN SODIUM 2.5 MG/1
3 TABLET ORAL ONCE
Refills: 0 | Status: DISCONTINUED | OUTPATIENT
Start: 2019-07-11 | End: 2019-07-11

## 2019-07-11 RX ORDER — ALBUTEROL 90 UG/1
2.5 AEROSOL, METERED ORAL EVERY 12 HOURS
Refills: 0 | Status: DISCONTINUED | OUTPATIENT
Start: 2019-07-11 | End: 2019-07-15

## 2019-07-11 RX ADMIN — Medication 2: at 17:37

## 2019-07-11 RX ADMIN — LISINOPRIL 20 MILLIGRAM(S): 2.5 TABLET ORAL at 06:10

## 2019-07-11 RX ADMIN — Medication 1000 UNIT(S): at 12:35

## 2019-07-11 RX ADMIN — Medication 25 MILLIGRAM(S): at 06:10

## 2019-07-11 RX ADMIN — PIPERACILLIN AND TAZOBACTAM 25 GRAM(S): 4; .5 INJECTION, POWDER, LYOPHILIZED, FOR SOLUTION INTRAVENOUS at 13:59

## 2019-07-11 RX ADMIN — PANTOPRAZOLE SODIUM 40 MILLIGRAM(S): 20 TABLET, DELAYED RELEASE ORAL at 06:10

## 2019-07-11 RX ADMIN — ALBUTEROL 2.5 MILLIGRAM(S): 90 AEROSOL, METERED ORAL at 19:16

## 2019-07-11 RX ADMIN — ATORVASTATIN CALCIUM 40 MILLIGRAM(S): 80 TABLET, FILM COATED ORAL at 21:15

## 2019-07-11 RX ADMIN — PIPERACILLIN AND TAZOBACTAM 25 GRAM(S): 4; .5 INJECTION, POWDER, LYOPHILIZED, FOR SOLUTION INTRAVENOUS at 21:14

## 2019-07-11 RX ADMIN — Medication 2: at 08:55

## 2019-07-11 RX ADMIN — Medication 2: at 12:36

## 2019-07-11 RX ADMIN — HYDROXYUREA 500 MILLIGRAM(S): 500 CAPSULE ORAL at 14:00

## 2019-07-11 RX ADMIN — PIPERACILLIN AND TAZOBACTAM 25 GRAM(S): 4; .5 INJECTION, POWDER, LYOPHILIZED, FOR SOLUTION INTRAVENOUS at 06:10

## 2019-07-11 RX ADMIN — Medication 81 MILLIGRAM(S): at 12:35

## 2019-07-11 NOTE — PROGRESS NOTE ADULT - PROBLEM SELECTOR PLAN 1
asymptomatic, tolerating po  no concerning pathology seen on CT   bld cxs ngtd  repeat lfts  diet as tolerated  monitor abdominal exam  no need for colonoscopy  will follow

## 2019-07-11 NOTE — SWALLOW BEDSIDE ASSESSMENT ADULT - SWALLOW EVAL: DIAGNOSIS
1. The patient demonstrated a mild oral dysphagia for puree, solid, and thin liquid textures marked by delayed bolus collection, transfer, and posterior transport. 2. The patient demonstrated a mild pharyngeal dysphagia for puree, solid, and thin liquid textures marked by a delayed pharyngeal swallow trigger with reduced hyolaryngeal elevation upon digital palpation w/o evidence of airway penetration.

## 2019-07-11 NOTE — PROGRESS NOTE ADULT - SUBJECTIVE AND OBJECTIVE BOX
Date/Time Patient Seen:  		  Referring MD:   Data Reviewed	       Patient is a 89y old  Female who presents with a chief complaint of sob/abdominal pain (10 Jul 2019 15:31)      Subjective/HPI     PAST MEDICAL & SURGICAL HISTORY:  Myeloproliferative disease  Anal fissure  Elevated red blood cell count  Atrial fibrillation: on coumadin  Skin cancer: of back - removed  Type 2 diabetes mellitus: not on insulin  Hypercholesterolemia  HTN (hypertension)  Myocardial infarction: 8 yrs ago  Hearing loss  S/P ORIF (open reduction internal fixation) fracture: right hip pinning  - 5 yrs ago  History of cataract surgery: 03/2016,  06/2016        Medication list         MEDICATIONS  (STANDING):  aspirin enteric coated 81 milliGRAM(s) Oral daily  atorvastatin 40 milliGRAM(s) Oral at bedtime  cholecalciferol 1000 Unit(s) Oral daily  dextrose 5%. 1000 milliLiter(s) (50 mL/Hr) IV Continuous <Continuous>  dextrose 50% Injectable 12.5 Gram(s) IV Push once  dextrose 50% Injectable 25 Gram(s) IV Push once  dextrose 50% Injectable 25 Gram(s) IV Push once  docusate sodium 100 milliGRAM(s) Oral daily  hydroxyurea 500 milliGRAM(s) Oral daily  insulin lispro (HumaLOG) corrective regimen sliding scale   SubCutaneous three times a day before meals  insulin lispro (HumaLOG) corrective regimen sliding scale   SubCutaneous at bedtime  lisinopril 20 milliGRAM(s) Oral daily  metoprolol succinate ER 25 milliGRAM(s) Oral daily  pantoprazole    Tablet 40 milliGRAM(s) Oral before breakfast  piperacillin/tazobactam IVPB.. 3.375 Gram(s) IV Intermittent every 8 hours  polyethylene glycol 3350 17 Gram(s) Oral daily    MEDICATIONS  (PRN):  dextrose 40% Gel 15 Gram(s) Oral once PRN Blood Glucose LESS THAN 70 milliGRAM(s)/deciliter  glucagon  Injectable 1 milliGRAM(s) IntraMuscular once PRN Glucose LESS THAN 70 milligrams/deciliter         Vitals log        ICU Vital Signs Last 24 Hrs  T(C): 36.8 (11 Jul 2019 07:44), Max: 37.1 (10 Jul 2019 19:59)  T(F): 98.3 (11 Jul 2019 07:44), Max: 98.8 (10 Jul 2019 19:59)  HR: 63 (11 Jul 2019 07:44) (61 - 83)  BP: 126/75 (11 Jul 2019 07:44) (126/75 - 171/76)  BP(mean): --  ABP: --  ABP(mean): --  RR: 18 (11 Jul 2019 07:44) (17 - 18)  SpO2: 98% (11 Jul 2019 07:44) (94% - 98%)           Input and Output:  I&O's Detail    10 Jul 2019 07:01  -  11 Jul 2019 07:00  --------------------------------------------------------  IN:    Solution: 150 mL  Total IN: 150 mL    OUT:  Total OUT: 0 mL    Total NET: 150 mL          Lab Data                        11.6   18.34 )-----------( 243      ( 10 Jul 2019 07:23 )             41.1     07-10    141  |  107  |  12  ----------------------------<  200<H>  4.1   |  26  |  0.85    Ca    8.9      10 Jul 2019 07:23  Mg     2.1     07-09    TPro  x   /  Alb  x   /  TBili  2.2<H>  /  DBili  .50<H>  /  AST  x   /  ALT  x   /  AlkPhos  x   07-09      CARDIAC MARKERS ( 10 Jul 2019 07:23 )  <.015 ng/mL / x     / 20 U/L / x     / 1.2 ng/mL  CARDIAC MARKERS ( 09 Jul 2019 23:40 )  <.015 ng/mL / x     / 21 U/L / x     / 1.2 ng/mL  CARDIAC MARKERS ( 09 Jul 2019 18:04 )  <.015 ng/mL / x     / x     / x     / x            Review of Systems	      Objective     Physical Examination    heart s1s2  lung dec BS  abd soft      Pertinent Lab findings & Imaging      Shira:  NO   Adequate UO     I&O's Detail    10 Jul 2019 07:01  -  11 Jul 2019 07:00  --------------------------------------------------------  IN:    Solution: 150 mL  Total IN: 150 mL    OUT:  Total OUT: 0 mL    Total NET: 150 mL               Discussed with:     Cultures:	        Radiology

## 2019-07-11 NOTE — PROGRESS NOTE ADULT - ASSESSMENT
89F w/pmh of Afib on coumadin, Myeloproliferative disorder on hydroxyurea, HTN, HLD, CAD s/p prior MI (8 years ago) with a known chronically occluded RCA, moderate disease in LCX and LAD managed medically, normal LV and Type 2 DM not on insulin presenting with episode of dyspnea on exertion and abdominal pain.          *CXR negative  *    - Unclear etiology of her dyspnea.  Though, her pro-BNP is elevated at >2500, she is clinically euvolemic.    - This could be her angina equivalent.  If this is related to ischemic heart disease, as she did have a positive nuclear stress test that we elected to manage medically  - Her CE's x 2 sets are negative  No further need to trend  - CT chest not reflective of volume overload/pleural effusion, however, groundglass opacity noted  - Her CxR is unremarkable  - telemetry 89F w/pmh of Afib on coumadin, Myeloproliferative disorder on hydroxyurea, HTN, HLD, CAD s/p prior MI (8 years ago) with a known chronically occluded RCA, moderate disease in LCX and LAD managed medically, normal LV and Type 2 DM not on insulin presenting with episode of dyspnea on exertion and abdominal pain.            *CT abdomen negative for pathology  *Per GI, colonoscopy uncessary       no concerning pathology seen on CT   bld cxs ngtd  repeat lfts  diet as tolerated  monitor abdominal exam  no need for colonoscopy 89F w/pmh of Afib on coumadin, Myeloproliferative disorder on hydroxyurea, HTN, HLD, CAD s/p prior MI (8 years ago) with a known chronically occluded RCA, moderate disease in LCX and LAD managed medically, normal LV and Type 2 DM not on insulin presenting with episode of dyspnea on exertion and abdominal pain. 89F w/pmh of Afib on coumadin, Myeloproliferative disorder on hydroxyurea, HTN, HLD, CAD s/p prior MI (8 years ago) with a known chronically occluded RCA, moderate disease in LCX and LAD managed medically, normal LV and Type 2 DM not on insulin presenting with episode of worsening dyspnea on exertion sec to worsening CAD requriing cath and resolving abdominal pain from acute diverticulosis.

## 2019-07-11 NOTE — PROGRESS NOTE ADULT - PROBLEM SELECTOR PLAN 4
- Her rate is controlled  - Would hold coumadin in anticipation of CC  - Continue Toprol 25 QD  - Monitor and replete lytes *Coumadin restarted, M/W/F = 2mg, Tu/Th/Sa/Sun = 3mg  *Continue metoprolol 25mg  *Watch electrolytes and replete as necessary  *Stable, controlled *Coumadin held for potential cath on 7/12.  *Normal schedule M/W/F = 2mg, Tu/Th/Sa/Sun = 3mg  *INR today is 2.36, must come down for potential cath.  *Continue metoprolol 25mg  *Stable, controlled -chronic, rate controlled  -Coumadin held for potential cath on 7/12.  *Normal schedule M/W/F = 2mg, Tu/Th/Sa/Sun = 3mg  *INR today is 2.36, must come down for potential cath.  *Continue metoprolol 25mg  *Stable, controlled

## 2019-07-11 NOTE — SWALLOW BEDSIDE ASSESSMENT ADULT - COMMENTS
The patient was seen this AM for an initial assessment of swallow function, at which time she was alert and cooperative. Upon clinician arrival, patient self-feeding breakfast consisting of regular solids with thin liquids without any reported difficulty. Per charting, the patient is a "89 y/o F w/pmh of Afib on coumadin, Myeloproliferative disorder on hydroxyurea, HTN, HLD, CAD s/p prior MI (8 years ago) with a known chronically occluded RCA, moderate disease in LCX and LAD managed medically, normal LV and Type 2 DM not on insulin presenting with episode of dyspnea on exertion and abdominal pain this morning. States that she generally has shortness of breath with exertion but the abdominal discomfort was new today. Admits to productive cough. Difficult for her to describe abdominal pain but states that it was epigastric, non-radiating and felt as if she needed to go to the bathroom but could not. She has a history of constipation and admits to recent diarrhea but takes miralax and colace daily." Recent CT of the chest revealed, "Nonspecific geographic groundglass airspace opacity, suggestive of air trapping. Numerous predominantly small volume mediastinal and bilateral hilar nodes, of unclear etiology. Colonic diverticulosis without diverticulitis or evidence of acute inflammation. No bowel obstruction. Splenomegaly." Discussed results and recommendations from this evaluation with the patient and call out to MD and RN.

## 2019-07-11 NOTE — PROGRESS NOTE ADULT - PROBLEM SELECTOR PLAN 1
poss LRTI  abnormal CT scan - nodule, GGO, LN, - will need rpt CT chest in 8 - 12 weeks  cont emp ABX  cvs rx regimen  Cardio and GI eval and follow up noted  will check Swallow eval  monitor WBC and labs   monitor vs and HD and Sat  keep sat > 88 pct  out of bed as tolerated  GOC documented - pt is full code  will follow

## 2019-07-11 NOTE — SWALLOW BEDSIDE ASSESSMENT ADULT - ASR SWALLOW ASPIRATION MONITOR
throat clearing/upper respiratory infection/change of breathing pattern/position upright (89Y)/oral hygiene/cough/fever/gurgly voice/pneumonia

## 2019-07-11 NOTE — PROGRESS NOTE ADULT - ASSESSMENT
This is a 89 year old woman with a history of CAD s/p prior MI with a known chronically occluded RCA, moderate disease in the LCX and LAD that has been managed medically, normal LV function, hypertension, AF on AC with Coumadin, DM who has been having increased dyspnea and presents to the ED with respiratory distress and abdominal pain.    MUNOZ  - Unclear etiology of her dyspnea.  Though, her pro-BNP is elevated at >2500, she is clinically euvolemic.    - This could be her angina equivalent.  If this is related to ischemic heart disease, as she did have a positive nuclear stress test that we elected to manage medically  - Her CE's x 2 sets are negative  No further need to trend  - CT chest not reflective of volume overload/pleural effusion, however, groundglass opacity noted  - Her CxR is unremarkable  -CW telemetry    CAD  - No evidence of acute ischemia per EKG  - Continue ASA, BB, ACEI, and statin  - No further cardiac enzymes needed at this point  - IF no other etiology found, will consider coronary angiography.  Discussed with patient and son  -TTE pending    Atrial fibrillation  - Her rate is controlled  - Would hold coumadin in anticipation of CC  - Continue Toprol 25 QD  - Monitor and replete lytes    HTN  - Continue BB and ACEI    VTE ppx  - On AC     Monitor and replete electrolytes. Keep K>4.0 and Mg>2.0.   Further cardiac workup pending clinical course  To follow closely with you  Ralf Jeronimo [facility]  Cardiology   Spectra #[phone]

## 2019-07-11 NOTE — PROGRESS NOTE ADULT - PROBLEM SELECTOR PLAN 6
no concerning pathology seen on CT   bld cxs ngtd  repeat lfts  diet as tolerated  monitor abdominal exam  no need for colonoscopy *CT abdomen negative for pathology  *Per GI, colonoscopy uncessary  *monitor abdominal exam  *Diet as tolerated -resolving  -CT abdomen diverticulosis otherwise negative for pathology  *Per GI, colonoscopy not indicated at this time  *monitor abdominal exam  *Diet as tolerated

## 2019-07-11 NOTE — PHYSICAL THERAPY INITIAL EVALUATION ADULT - ADDITIONAL COMMENTS
Pt states she lives with granddaughter in [facility] with 2STE with no HR, uses [facility] and has RW. Pt states she was fully independent functionally and with ADL's, states her son takes her to appointments and assists as needed.

## 2019-07-11 NOTE — PROGRESS NOTE ADULT - PROBLEM SELECTOR PLAN 3
- No evidence of acute ischemia per EKG  - Continue ASA, BB, ACEI, and statin  - No further cardiac enzymes needed at this point  - IF no other etiology found, will consider coronary angiography.  Discussed with patient and son  -TTE pending *Cardiac enzymes negative x 3  *Continue ASA, metoprolol, lisinopril, atorvastatin   *EKG negative for acute ischemia  *F/u with pending echo  *Possible CTA angio if no other etiology of SOB found -worsening, acute on chronic  -apprec cardio recs will likely need cath once inr normalizes  -Cardiac enzymes negative x 3  *Continue ASA, metoprolol, lisinopril, atorvastatin   *EKG negative for acute ischemia  *F/u with pending echo  consider CTA if symptoms worsen

## 2019-07-11 NOTE — PHYSICAL THERAPY INITIAL EVALUATION ADULT - PERTINENT HX OF CURRENT PROBLEM, REHAB EVAL
Pt is 89 y.o. F presenting with episode of dyspnea on exertion and abdominal pain with productive cough, found to have leukocytosis. Pt admitted to tele for SOB and r/o ischemic heart dz.

## 2019-07-11 NOTE — PROGRESS NOTE ADULT - SUBJECTIVE AND OBJECTIVE BOX
INTERVAL HPI/OVERNIGHT EVENTS:    pt seen and examined  denies n/v/abd pain  reports bm yesterday  states shes eating    MEDICATIONS  (STANDING):  ALBUTerol    0.083% 2.5 milliGRAM(s) Nebulizer every 12 hours  aspirin enteric coated 81 milliGRAM(s) Oral daily  atorvastatin 40 milliGRAM(s) Oral at bedtime  cholecalciferol 1000 Unit(s) Oral daily  dextrose 5%. 1000 milliLiter(s) (50 mL/Hr) IV Continuous <Continuous>  dextrose 50% Injectable 12.5 Gram(s) IV Push once  dextrose 50% Injectable 25 Gram(s) IV Push once  dextrose 50% Injectable 25 Gram(s) IV Push once  hydroxyurea 500 milliGRAM(s) Oral daily  insulin lispro (HumaLOG) corrective regimen sliding scale   SubCutaneous three times a day before meals  insulin lispro (HumaLOG) corrective regimen sliding scale   SubCutaneous at bedtime  lisinopril 20 milliGRAM(s) Oral daily  metoprolol succinate ER 25 milliGRAM(s) Oral daily  pantoprazole    Tablet 40 milliGRAM(s) Oral before breakfast  piperacillin/tazobactam IVPB.. 3.375 Gram(s) IV Intermittent every 8 hours    MEDICATIONS  (PRN):  dextrose 40% Gel 15 Gram(s) Oral once PRN Blood Glucose LESS THAN 70 milliGRAM(s)/deciliter  glucagon  Injectable 1 milliGRAM(s) IntraMuscular once PRN Glucose LESS THAN 70 milligrams/deciliter      Allergies    No Known Allergies    Intolerances        Review of Systems:    General:  No wt loss, fevers, chills, night sweats, fatigue   Eyes:  Good vision, no reported pain  ENT:  No sore throat, pain, runny nose, dysphagia  CV:  No pain, palpitations, hypo/hypertension  Resp:  No dyspnea, cough, tachypnea, wheezing  GI:  No pain, No nausea, No vomiting, No diarrhea, No constipation, No weight loss, No fever, No pruritis, No rectal bleeding, No melena, No dysphagia  :  No pain, bleeding, incontinence, nocturia  Muscle:  No pain, weakness  Neuro:  No weakness, tingling, memory problems  Psych:  No fatigue, insomnia, mood problems, depression  Endocrine:  No polyuria, polydypsia, cold/heat intolerance  Heme:  No petechiae, ecchymosis, easy bruisability  Skin:  No rash, tattoos, scars, edema      Vital Signs Last 24 Hrs  T(C): 36.8 (2019 07:44), Max: 37.1 (10 Jul 2019 19:59)  T(F): 98.3 (2019 07:44), Max: 98.8 (10 Jul 2019 19:59)  HR: 63 (2019 07:44) (63 - 83)  BP: 126/75 (2019 07:44) (126/75 - 171/76)  BP(mean): --  RR: 18 (2019 07:44) (17 - 18)  SpO2: 98% (2019 07:44) (94% - 98%)    PHYSICAL EXAM:    Constitutional: NAD  HEENT: ncat  Neck: No LAD  Respiratory: dec bs  Cardiovascular: S1 and S2, RRR  Gastrointestinal: soft nt mild dt  Extremities: No peripheral edema  Vascular: 2+ peripheral pulses  Neurological: awake alert  Skin: No rashes      LABS:                        11.7   20.60 )-----------( 213      ( 2019 09:53 )             41.4     07-11    137  |  104  |  13  ----------------------------<  347<H>  4.4   |  25  |  0.96    Ca    8.6      2019 09:53  Mg     2.1     07-    TPro  x   /  Alb  x   /  TBili  2.2<H>  /  DBili  .50<H>  /  AST  x   /  ALT  x   /  AlkPhos  x   07-09    PT/INR - ( 2019 07:06 )   PT: 27.4 sec;   INR: 2.36 ratio         PTT - ( 10 Jul 2019 07:23 )  PTT:45.8 sec  Urinalysis Basic - ( 2019 21:44 )    Color: Yellow / Appearance: Clear / S.000 / pH: x  Gluc: x / Ketone: Negative  / Bili: Negative / Urobili: Negative   Blood: x / Protein: Negative / Nitrite: Positive   Leuk Esterase: Trace / RBC: 0-2 /HPF / WBC 3-5   Sq Epi: x / Non Sq Epi: Occasional / Bacteria: Occasional        RADIOLOGY & ADDITIONAL TESTS:

## 2019-07-11 NOTE — PROGRESS NOTE ADULT - PROBLEM SELECTOR PLAN 1
- Unclear etiology of her dyspnea.  Though, her pro-BNP is elevated at >2500, she is clinically euvolemic.    - This could be her angina equivalent.  If this is related to ischemic heart disease, as she did have a positive nuclear stress test that we elected to manage medically  - Her CE's x 2 sets are negative  No further need to trend  - CT chest not reflective of volume overload/pleural effusion, however, groundglass opacity noted  - Her CxR is unremarkable  - telemetry *CXR negative  *Cardiac enzymes negative x 3  *CT chest negative for pleural effusion or volume overload. Ground glass opacities noted.  *possible s/p angina. H/o positive nuclear stress test per cardio. -acute  -CXR negative  *Cardiac enzymes negative x 3  *CT chest negative for pleural effusion or volume overload. Ground glass opacities noted.  *possible s/p angina. H/o positive nuclear stress test per cardio. 10-Jul-2017

## 2019-07-11 NOTE — PROGRESS NOTE ADULT - PROBLEM SELECTOR PLAN 2
Plan: abx/further w/u per primary. *Leukocytosis trending higher. As of 7/11, WBC trending higher to 20, within neutrophillic predominance increasing.  *Continue Zosyn -may be reactive, pct low elevated  -Leukocytosis trending higher. As of 7/11, WBC trending higher to 20, within neutrophillic predominance increasing.  *Continue Zosyn, will likely descalate, apprec ID recw

## 2019-07-11 NOTE — PROGRESS NOTE ADULT - SUBJECTIVE AND OBJECTIVE BOX
[facility] -- Elida Douglas, Ayaz Macias, Troy Coley Savella  Office # [phone]      Follow Up:    SOB/Dypsnea   Subjective/Observations:   No events overnight resting comfortably in bed.  No complaints of chest pain,  or palpitations reported. No signs of orthopnea or PND. Pt c/o MUNOZ    REVIEW OF SYSTEMS: All other review of systems is negative unless indicated above    PAST MEDICAL & SURGICAL HISTORY:  Myeloproliferative disease  Anal fissure  Elevated red blood cell count  Atrial fibrillation: on coumadin  Skin cancer: of back - removed  Type 2 diabetes mellitus: not on insulin  Hypercholesterolemia  HTN (hypertension)  Myocardial infarction: 8 yrs ago  Hearing loss  S/P ORIF (open reduction internal fixation) fracture: right hip pinning  - 5 yrs ago  History of cataract surgery: 03/2016,  06/2016      MEDICATIONS  (STANDING):  ALBUTerol    0.083% 2.5 milliGRAM(s) Nebulizer every 12 hours  aspirin enteric coated 81 milliGRAM(s) Oral daily  atorvastatin 40 milliGRAM(s) Oral at bedtime  cholecalciferol 1000 Unit(s) Oral daily  dextrose 5%. 1000 milliLiter(s) (50 mL/Hr) IV Continuous <Continuous>  dextrose 50% Injectable 12.5 Gram(s) IV Push once  dextrose 50% Injectable 25 Gram(s) IV Push once  dextrose 50% Injectable 25 Gram(s) IV Push once  hydroxyurea 500 milliGRAM(s) Oral daily  insulin lispro (HumaLOG) corrective regimen sliding scale   SubCutaneous three times a day before meals  insulin lispro (HumaLOG) corrective regimen sliding scale   SubCutaneous at bedtime  lisinopril 20 milliGRAM(s) Oral daily  metoprolol succinate ER 25 milliGRAM(s) Oral daily  pantoprazole    Tablet 40 milliGRAM(s) Oral before breakfast  piperacillin/tazobactam IVPB.. 3.375 Gram(s) IV Intermittent every 8 hours    MEDICATIONS  (PRN):  dextrose 40% Gel 15 Gram(s) Oral once PRN Blood Glucose LESS THAN 70 milliGRAM(s)/deciliter  glucagon  Injectable 1 milliGRAM(s) IntraMuscular once PRN Glucose LESS THAN 70 milligrams/deciliter      Allergies    No Known Allergies    Intolerances        Vital Signs Last 24 Hrs  T(C): 36.6 (11 Jul 2019 12:18), Max: 37.1 (10 Jul 2019 19:59)  T(F): 97.9 (11 Jul 2019 12:18), Max: 98.8 (10 Jul 2019 19:59)  HR: 59 (11 Jul 2019 12:18) (59 - 83)  BP: 162/76 (11 Jul 2019 12:18) (126/75 - 171/76)  BP(mean): --  RR: 18 (11 Jul 2019 12:18) (17 - 18)  SpO2: 94% (11 Jul 2019 12:18) (94% - 98%)    I&O's Summary    10 Jul 2019 07:01  -  11 Jul 2019 07:00  --------------------------------------------------------  IN: 150 mL / OUT: 0 mL / NET: 150 mL          PHYSICAL EXAM:  TELE: Afib   Constitutional: NAD, awake and alert, well-developed  HEENT: Moist Mucous Membranes, Anicteric  Pulmonary: Non-labored, breath sounds with diminished & crackles at R base  No  wheezes, crackles or rhonchi   Cardiovascular: Irregular, S1 and S2 nl, No murmurs, rubs, gallops or clicks  Gastrointestinal: Bowel Sounds present, soft, nontender.   Lymph: No lymphadenopathy. No peripheral edema.  Skin: No visible rashes or ulcers.  Psych:  Mood & affect appropriate    LABS: All Labs Reviewed:                        11.7   20.60 )-----------( 213      ( 11 Jul 2019 09:53 )             41.4                         11.6   18.34 )-----------( 243      ( 10 Jul 2019 07:23 )             41.1                         12.5   21.64 )-----------( 215      ( 09 Jul 2019 18:04 )             44.8     11 Jul 2019 09:53    137    |  104    |  13     ----------------------------<  347    4.4     |  25     |  0.96   10 Jul 2019 07:23    141    |  107    |  12     ----------------------------<  200    4.1     |  26     |  0.85   09 Jul 2019 18:04    140    |  106    |  16     ----------------------------<  209    4.4     |  26     |  0.88     Ca    8.6        11 Jul 2019 09:53  Ca    8.9        10 Jul 2019 07:23  Ca    8.6        09 Jul 2019 18:04  Mg     2.1       09 Jul 2019 18:04    TPro  x      /  Alb  x      /  TBili  2.2    /  DBili  .50    /  AST  x      /  ALT  x      /  AlkPhos  x      09 Jul 2019 23:40  TPro  6.7    /  Alb  3.7    /  TBili  1.8    /  DBili  x      /  AST  12     /  ALT  20     /  AlkPhos  189    09 Jul 2019 18:04    PT/INR - ( 11 Jul 2019 07:06 )   PT: 27.4 sec;   INR: 2.36 ratio         PTT - ( 10 Jul 2019 07:23 )  PTT:45.8 sec  CARDIAC MARKERS ( 10 Jul 2019 07:23 )  <.015 ng/mL / x     / 20 U/L / x     / 1.2 ng/mL  CARDIAC MARKERS ( 09 Jul 2019 23:40 )  <.015 ng/mL / x     / 21 U/L / x     / 1.2 ng/mL  CARDIAC MARKERS ( 09 Jul 2019 18:04 )  <.015 ng/mL / x     / x     / x     / x             ECG:  < from: 12 Lead ECG (07.09.19 @ 18:13) >  Ventricular Rate 68 BPM    Atrial Rate 234 BPM    QRS Duration 76 ms    Q-T Interval 402 ms    QTC Calculation(Bezet) 427 ms    R Axis -2 degrees    T Axis -17 degrees    Diagnosis Line Atrial fibrillation  T wave abnormality, consider inferior ischemia  Abnormal ECG  When compared with ECG of 14-DEC-2012 14:23,  No significant change was found  Confirmed by Gilberto SERRA, Ralf ([id number]) on 7/10/2019 12:01:24 PM    < end of copied text >    Echo:  pending interpretation   Radiology:  < from: CT Chest w/ IV Cont (07.09.19 @ 20:12) >  EXAM:  CT ABDOMEN AND PELVIS OC IC                          EXAM:  CT CHEST IC                            PROCEDURE DATE:  07/09/2019          INTERPRETATION:  CLINICAL INFORMATION: Shortness of breath. Left lower   quadrant abdominal pain and diarrhea.    COMPARISON: None.    PROCEDURE:   CT of the Chest, Abdomen and Pelvis was performed with intravenous   contrast.   Intravenous contrast: 100 ml Omnipaque 350. 0 ml discarded.  Oral contrast: Positive contrast was administered.  Sagittal and coronal reformats were performed.    FINDINGS:    CHEST:     LUNGS AND LARGE AIRWAYS: Patent central airways. Geographic areas of mild   groundglass air space opacity, may reflect air trapping. 3 mm left lower   lobe pulmonary nodule (series 2, image 56). In the absence of known risk   factors, no further routine follow-up is recommended.  PLEURA: No pleural effusion.  VESSELS: Atherosclerosis of the thoracic aorta which is otherwise normal   in caliber.  HEART: Heart size is normal. No pericardialeffusion. Aortic valvular,   coronary artery, and mitral annular calcification.  MEDIASTINUM AND RASTA: Numerous small volume mediastinal and bilateral   hilar nodes.  CHEST WALL AND LOWER NECK: Within normal limits.    ABDOMEN AND PELVIS:    LIVER: Within normal limits.  BILE DUCTS: Normal caliber.  GALLBLADDER: Within normal limits.  SPLEEN: Splenomegaly measuring up to 16.0 cm in the AP dimension..  PANCREAS: Within normal limits.  ADRENALS: Within normal limits.  KIDNEYS/URETERS: Bilateral renal cysts and low-attenuation lesions too   small to accurately characterize. No hydronephrosis.    BLADDER: Within normal limits.  REPRODUCTIVE ORGANS: Uterus and adnexa within normal limits.    BOWEL: No bowel obstruction. Colonic diverticulosis without evidence of   acute diverticulitis. Appendix is normal.  PERITONEUM: No ascites.  VESSELS:  Atherosclerosis of the abdominal aorta and its branch vessels.  RETROPERITONEUM: No lymphadenopathy.    ABDOMINAL WALL: Within normal limits.  BONES: Status post open reduction internal fixation of the right hip.   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 Multilevel degenerative changes   of the spine.    IMPRESSION:     Nonspecific geographic groundglass airspace opacity, suggestive of air   trapping.    Numerous predominantly small volume mediastinal and bilateral hilar   nodes, of unclear etiology.    Colonic diverticulosis without diverticulitis or evidence of acute   inflammation. No bowel obstruction.    Splenomegaly.      ANA NEWTON, ATTENDING RADIOLOGIST  This document has been electronically signed. Jul 9 2019  8:57PM        < end of copied text >           Ralf Jeronimo [facility]   Cardiology

## 2019-07-11 NOTE — PROGRESS NOTE ADULT - SUBJECTIVE AND OBJECTIVE BOX
Patient is a 89y old  Female who presents with a chief complaint of sob/abdominal pain (2019 12:26)    HPI:  89F w/pmh of Afib on coumadin, Myeloproliferative disorder on hydroxyurea, HTN, HLD, CAD s/p prior MI (8 years ago) with a known chronically occluded RCA, moderate disease in LCX and LAD managed medically, normal LV and Type 2 DM not on insulin presenting with episode of dyspnea on exertion and abdominal pain this morning. States that she generally has shortness of breath with exertion but the abdominal discomfort was new today. Admits to productive cough. Difficult for her to describe abdominal pain but states that it was epigastric, non-radiating and felt as if she needed to go to the bathroom but could not. She has a history of constipation and admits to recent diarrhea but takes miralax and colace daily. Last bowel movement this morning. Denies eating anything out of the ordinary. Also admits that she felt alone this morning when these symptoms occurred. Denies headache, fevers/chills, n/v, chest pain, palpitations, diarrhea, dysuria, hematuria, edema or weakness. Denies recent use of steroids. Denies recent falls, sick contacts or recent travel. Of note, patient's symptoms improved upon arrival to ER. Unknown last colonoscopy.     In the ED, patient's vitals were: T97.9F, HR 76, /73, RR 26 and SpO2 99% on room air. Significant labs include: WBC 21.64, INR 2.25, Tbili 1.8, alk phos 189, trops neg x1, proBNP 2567.   CT A/P: Nonspecific geographic groundglass airspace opacity, suggestive of air   trapping. Numerous predominantly small volume mediastinal and bilateral hilar   nodes, of unclear etiology. Colonic diverticulosis without diverticulitis or evidence of acute inflammation. No bowel obstruction. Splenomegaly.  CXR: stable chest  Pt given Zosyn x1.   EKG: Afib, HR 68, T wave inversion in V1    U/A shows +nitrite (2019 22:09)    -----------------------------------------------  INTERVAL HPI/OVERNIGHT EVENTS:  -----------------------------------------------  Patient examined at bedside. Patient slept well, and ate recently. Patient denies chest pain, abdominal pain, N/V/D at this time. Patient admits to .     T(C): 36.6 (19 @ 12:18), Max: 37.1 (07-10-19 @ 19:59)  HR: 59 (19 @ 12:18) (59 - 73)  BP: 162/76 (19 @ 12:18) (126/75 - 162/76)  RR: 18 (19 @ 12:18) (17 - 18)  SpO2: 94% (19 @ 12:18) (94% - 98%)  Wt(kg): --  I&O's Summary    10 Jul 2019 07:01  -  2019 07:00  --------------------------------------------------------  IN: 150 mL / OUT: 0 mL / NET: 150 mL      ----------------------------  REVIEW OF SYSTEMS:  CONSTITUTIONAL: No fever, fatigue  EYES: No visual disturbances  ENMT:  No difficulty hearing; No throat pain  RESPIRATORY: No shortness of breath. No cough, wheezing, chills or hemoptysis  CARDIOVASCULAR: No chest pain, palpitations,   GASTROINTESTINAL: No abdominal or epigastric pain. No nausea, vomiting, or hematemesis; No diarrhea or constipation. No melena or hematochezia.  GENITOURINARY: No dysuria, frequency, hematuria, or incontinence  NEUROLOGICAL: No headaches, dizziness, numbness, or tremors  MUSCULOSKELETAL: No joint pain or swelling;     ----------------------  PHYSICAL EXAM:  GENERAL: NAD, well-groomed, well-developed  HEAD:  Atraumatic, Normocephalic  EYES: EOMI, PERRLA, conjunctiva and sclera clear  ENMT: No tonsillar erythema, exudates, or enlargement; Moist mucous membranes, Good dentition, No lesions  NECK: Supple, No JVD, Normal thyroid  NERVOUS SYSTEM:  Alert & Oriented X3  CHEST/LUNG: Clear to ausculation bilaterally; No rales, rhonchi, wheezing, or rubs  HEART: Regular rate and rhythm; No murmurs, rubs, or gallops  ABDOMEN: Soft, Nontender, Nondistended; Bowel sounds present  EXTREMITIES:  2+ Peripheral Pulses, No clubbing, cyanosis, or edema  LYMPH: No lymphadenopathy noted  SKIN: No rashes or lesions    ----------------------  MEDICATIONS  (STANDING):  ALBUTerol    0.083% 2.5 milliGRAM(s) Nebulizer every 12 hours  aspirin enteric coated 81 milliGRAM(s) Oral daily  atorvastatin 40 milliGRAM(s) Oral at bedtime  cholecalciferol 1000 Unit(s) Oral daily  dextrose 5%. 1000 milliLiter(s) (50 mL/Hr) IV Continuous <Continuous>  dextrose 50% Injectable 12.5 Gram(s) IV Push once  dextrose 50% Injectable 25 Gram(s) IV Push once  dextrose 50% Injectable 25 Gram(s) IV Push once  hydroxyurea 500 milliGRAM(s) Oral daily  insulin lispro (HumaLOG) corrective regimen sliding scale   SubCutaneous three times a day before meals  insulin lispro (HumaLOG) corrective regimen sliding scale   SubCutaneous at bedtime  lisinopril 20 milliGRAM(s) Oral daily  metoprolol succinate ER 25 milliGRAM(s) Oral daily  pantoprazole    Tablet 40 milliGRAM(s) Oral before breakfast  piperacillin/tazobactam IVPB.. 3.375 Gram(s) IV Intermittent every 8 hours    MEDICATIONS  (PRN):  dextrose 40% Gel 15 Gram(s) Oral once PRN Blood Glucose LESS THAN 70 milliGRAM(s)/deciliter  glucagon  Injectable 1 milliGRAM(s) IntraMuscular once PRN Glucose LESS THAN 70 milligrams/deciliter      --------  LABS:                        11.7   20.60 )-----------( 213      ( 2019 09:53 )             41.4     07-11    137  |  104  |  13  ----------------------------<  347<H>  4.4   |  25  |  0.96    Ca    8.6      2019 09:53  Mg     2.1         TPro  x   /  Alb  x   /  TBili  2.2<H>  /  DBili  .50<H>  /  AST  x   /  ALT  x   /  AlkPhos  x       PT/INR - ( 2019 07:06 )   PT: 27.4 sec;   INR: 2.36 ratio         PTT - ( 10 Jul 2019 07:23 )  PTT:45.8 sec  Urinalysis Basic - ( 2019 21:44 )    Color: Yellow / Appearance: Clear / S.000 / pH: x  Gluc: x / Ketone: Negative  / Bili: Negative / Urobili: Negative   Blood: x / Protein: Negative / Nitrite: Positive   Leuk Esterase: Trace / RBC: 0-2 /HPF / WBC 3-5   Sq Epi: x / Non Sq Epi: Occasional / Bacteria: Occasional      CAPILLARY BLOOD GLUCOSE      POCT Blood Glucose.: 241 mg/dL (2019 12:15)  POCT Blood Glucose.: 243 mg/dL (2019 08:12)  POCT Blood Glucose.: 274 mg/dL (10 Jul 2019 21:00)  POCT Blood Glucose.: 207 mg/dL (10 Jul 2019 17:11)      07-10 @ 09:40   No growth to date.  --  --   @ 23:11   <10,000 CFU/mL Normal Urogenital Gabi  --  --          ----------------------------------------------  RADIOLOGY & ADDITIONAL TESTS: Patient is a 89y old  Female who presents with a chief complaint of sob/abdominal pain (2019 12:26)    HPI:  89F w/pmh of Afib on coumadin, Myeloproliferative disorder on hydroxyurea, HTN, HLD, CAD s/p prior MI (8 years ago) with a known chronically occluded RCA, moderate disease in LCX and LAD managed medically, normal LV and Type 2 DM not on insulin presenting with episode of dyspnea on exertion and abdominal pain this morning. States that she generally has shortness of breath with exertion but the abdominal discomfort was new today. Admits to productive cough. Difficult for her to describe abdominal pain but states that it was epigastric, non-radiating and felt as if she needed to go to the bathroom but could not. She has a history of constipation and admits to recent diarrhea but takes miralax and colace daily. Last bowel movement this morning. Denies eating anything out of the ordinary. Also admits that she felt alone this morning when these symptoms occurred. Denies headache, fevers/chills, n/v, chest pain, palpitations, diarrhea, dysuria, hematuria, edema or weakness. Denies recent use of steroids. Denies recent falls, sick contacts or recent travel. Of note, patient's symptoms improved upon arrival to ER. Unknown last colonoscopy.     In the ED, patient's vitals were: T97.9F, HR 76, /73, RR 26 and SpO2 99% on room air. Significant labs include: WBC 21.64, INR 2.25, Tbili 1.8, alk phos 189, trops neg x1, proBNP 2567.   CT A/P: Nonspecific geographic groundglass airspace opacity, suggestive of air   trapping. Numerous predominantly small volume mediastinal and bilateral hilar   nodes, of unclear etiology. Colonic diverticulosis without diverticulitis or evidence of acute inflammation. No bowel obstruction. Splenomegaly.  CXR: stable chest  Pt given Zosyn x1.   EKG: Afib, HR 68, T wave inversion in V1    U/A shows +nitrite (2019 22:09)    -----------------------------------------------  INTERVAL HPI/OVERNIGHT EVENTS:  -----------------------------------------------  Patient examined at bedside. Patient slept well, and ate recently. Patient denies chest pain, abdominal pain, N/V at this time. Patient admits to an episode of diarrhea last night.    T(C): 36.6 (19 @ 12:18), Max: 37.1 (07-10-19 @ 19:59)  HR: 59 (19 @ 12:18) (59 - 73)  BP: 162/76 (19 @ 12:18) (126/75 - 162/76)  RR: 18 (19 @ 12:18) (17 - 18)  SpO2: 94% (19 @ 12:18) (94% - 98%)  Wt(kg): --  I&O's Summary    10 Jul 2019 07:01  -  2019 07:00  --------------------------------------------------------  IN: 150 mL / OUT: 0 mL / NET: 150 mL      ----------------------------  REVIEW OF SYSTEMS:  CONSTITUTIONAL: No fever, fatigue  EYES: No visual disturbances  ENMT:  No difficulty hearing; No throat pain  RESPIRATORY: No shortness of breath. No cough, wheezing, chills or hemoptysis  CARDIOVASCULAR: No chest pain, palpitations,   GASTROINTESTINAL: No abdominal or epigastric pain. No nausea, vomiting, or hematemesis; No diarrhea or constipation. No melena or hematochezia.  GENITOURINARY: No dysuria, frequency, hematuria, or incontinence  NEUROLOGICAL: No headaches, dizziness, numbness, or tremors  MUSCULOSKELETAL: No joint pain or swelling;     ----------------------  PHYSICAL EXAM:  GENERAL: NAD, well-groomed, well-developed  HEAD:  Atraumatic, Normocephalic  EYES: EOMI, PERRLA, conjunctiva and sclera clear  ENMT: No tonsillar erythema, exudates, or enlargement; Moist mucous membranes, Good dentition, No lesions  NECK: Supple, No JVD, Normal thyroid  NERVOUS SYSTEM:  Alert & Oriented X3  CHEST/LUNG: Clear to ausculation bilaterally; No rales, rhonchi, wheezing, or rubs  HEART: Regular rate and rhythm; No murmurs, rubs, or gallops  ABDOMEN: Soft, Nontender, Nondistended; Bowel sounds present  EXTREMITIES:  2+ Peripheral Pulses, No clubbing, cyanosis, or edema  LYMPH: No lymphadenopathy noted  SKIN: No rashes or lesions    ----------------------  MEDICATIONS  (STANDING):  ALBUTerol    0.083% 2.5 milliGRAM(s) Nebulizer every 12 hours  aspirin enteric coated 81 milliGRAM(s) Oral daily  atorvastatin 40 milliGRAM(s) Oral at bedtime  cholecalciferol 1000 Unit(s) Oral daily  dextrose 5%. 1000 milliLiter(s) (50 mL/Hr) IV Continuous <Continuous>  dextrose 50% Injectable 12.5 Gram(s) IV Push once  dextrose 50% Injectable 25 Gram(s) IV Push once  dextrose 50% Injectable 25 Gram(s) IV Push once  hydroxyurea 500 milliGRAM(s) Oral daily  insulin lispro (HumaLOG) corrective regimen sliding scale   SubCutaneous three times a day before meals  insulin lispro (HumaLOG) corrective regimen sliding scale   SubCutaneous at bedtime  lisinopril 20 milliGRAM(s) Oral daily  metoprolol succinate ER 25 milliGRAM(s) Oral daily  pantoprazole    Tablet 40 milliGRAM(s) Oral before breakfast  piperacillin/tazobactam IVPB.. 3.375 Gram(s) IV Intermittent every 8 hours    MEDICATIONS  (PRN):  dextrose 40% Gel 15 Gram(s) Oral once PRN Blood Glucose LESS THAN 70 milliGRAM(s)/deciliter  glucagon  Injectable 1 milliGRAM(s) IntraMuscular once PRN Glucose LESS THAN 70 milligrams/deciliter      --------  LABS:                        11.7   20.60 )-----------( 213      ( 2019 09:53 )             41.4     07-11    137  |  104  |  13  ----------------------------<  347<H>  4.4   |  25  |  0.96    Ca    8.6      2019 09:53  Mg     2.1         TPro  x   /  Alb  x   /  TBili  2.2<H>  /  DBili  .50<H>  /  AST  x   /  ALT  x   /  AlkPhos  x       PT/INR - ( 2019 07:06 )   PT: 27.4 sec;   INR: 2.36 ratio         PTT - ( 10 Jul 2019 07:23 )  PTT:45.8 sec  Urinalysis Basic - ( 2019 21:44 )    Color: Yellow / Appearance: Clear / S.000 / pH: x  Gluc: x / Ketone: Negative  / Bili: Negative / Urobili: Negative   Blood: x / Protein: Negative / Nitrite: Positive   Leuk Esterase: Trace / RBC: 0-2 /HPF / WBC 3-5   Sq Epi: x / Non Sq Epi: Occasional / Bacteria: Occasional      CAPILLARY BLOOD GLUCOSE      POCT Blood Glucose.: 241 mg/dL (2019 12:15)  POCT Blood Glucose.: 243 mg/dL (2019 08:12)  POCT Blood Glucose.: 274 mg/dL (10 Jul 2019 21:00)  POCT Blood Glucose.: 207 mg/dL (10 Jul 2019 17:11)      07-10 @ 09:40   No growth to date.  --  --   @ 23:11   <10,000 CFU/mL Normal Urogenital Gabi  --  --          ----------------------------------------------  RADIOLOGY & ADDITIONAL TESTS: Patient is a 89y old  Female who presents with a chief complaint of sob/abdominal pain (2019 12:26)    HPI:  89F w/pmh of Afib on coumadin, Myeloproliferative disorder on hydroxyurea, HTN, HLD, CAD s/p prior MI (8 years ago) with a known chronically occluded RCA, moderate disease in LCX and LAD managed medically, normal LV and Type 2 DM not on insulin presenting with episode of dyspnea on exertion and abdominal pain this morning. States that she generally has shortness of breath with exertion but the abdominal discomfort was new today. Admits to productive cough. Difficult for her to describe abdominal pain but states that it was epigastric, non-radiating and felt as if she needed to go to the bathroom but could not. She has a history of constipation and admits to recent diarrhea but takes miralax and colace daily. Last bowel movement this morning. Denies eating anything out of the ordinary. Also admits that she felt alone this morning when these symptoms occurred. Denies headache, fevers/chills, n/v, chest pain, palpitations, diarrhea, dysuria, hematuria, edema or weakness. Denies recent use of steroids. Denies recent falls, sick contacts or recent travel. Of note, patient's symptoms improved upon arrival to ER. Unknown last colonoscopy.     In the ED, patient's vitals were: T97.9F, HR 76, /73, RR 26 and SpO2 99% on room air. Significant labs include: WBC 21.64, INR 2.25, Tbili 1.8, alk phos 189, trops neg x1, proBNP 2567.   CT A/P: Nonspecific geographic groundglass airspace opacity, suggestive of air   trapping. Numerous predominantly small volume mediastinal and bilateral hilar   nodes, of unclear etiology. Colonic diverticulosis without diverticulitis or evidence of acute inflammation. No bowel obstruction. Splenomegaly.  CXR: stable chest  Pt given Zosyn x1.   EKG: Afib, HR 68, T wave inversion in V1    U/A shows +nitrite (2019 22:09)    -----------------------------------------------  INTERVAL HPI/OVERNIGHT EVENTS:  -----------------------------------------------  Patient examined at bedside. Patient slept well, and ate recently. Patient denies chest pain, abdominal pain, N/V at this time. Patient admits to an episode of diarrhea last night. Switched to low fiber, low fat diet. Leukocytosis trended up to 20.60 today. Patient admits to shortness of breath improving. Patient denies any other complaints at this time.     T(C): 36.6 (19 @ 12:18), Max: 37.1 (07-10-19 @ 19:59)  HR: 59 (19 @ 12:18) (59 - 73)  BP: 162/76 (19 @ 12:18) (126/75 - 162/76)  RR: 18 (19 @ 12:18) (17 - 18)  SpO2: 94% (19 @ 12:18) (94% - 98%)  Wt(kg): --  I&O's Summary    10 Jul 2019 07:01  -  2019 07:00  --------------------------------------------------------  IN: 150 mL / OUT: 0 mL / NET: 150 mL      ----------------------------  REVIEW OF SYSTEMS:  CONSTITUTIONAL: No fever, fatigue  EYES: No visual disturbances  RESPIRATORY: No shortness of breath. No cough, wheezing, chills or hemoptysis  CARDIOVASCULAR: No chest pain, palpitations,   GASTROINTESTINAL: No abdominal or epigastric pain. No nausea, vomiting, or hematemesis; No diarrhea or constipation.   GENITOURINARY: No dysuria, frequency, hematuria, or incontinence  NEUROLOGICAL: No headaches    ----------------------  PHYSICAL EXAM:  GENERAL: NAD, well-groomed, well-developed  HEAD:  Atraumatic, Normocephalic  NERVOUS SYSTEM:  Alert & Oriented X3  CHEST/LUNG: Clear to ausculation bilaterally; No rales, rhonchi, wheezing, or rubs  HEART: Regular rate and rhythm; No murmurs, rubs, or gallops  ABDOMEN: Soft, Nontender, Nondistended; Bowel sounds present  EXTREMITIES:  No clubbing, cyanosis, or edema  LYMPH: No lymphadenopathy noted  SKIN: No rashes or lesions    ----------------------  MEDICATIONS  (STANDING):  ALBUTerol    0.083% 2.5 milliGRAM(s) Nebulizer every 12 hours  aspirin enteric coated 81 milliGRAM(s) Oral daily  atorvastatin 40 milliGRAM(s) Oral at bedtime  cholecalciferol 1000 Unit(s) Oral daily  dextrose 5%. 1000 milliLiter(s) (50 mL/Hr) IV Continuous <Continuous>  dextrose 50% Injectable 12.5 Gram(s) IV Push once  dextrose 50% Injectable 25 Gram(s) IV Push once  dextrose 50% Injectable 25 Gram(s) IV Push once  hydroxyurea 500 milliGRAM(s) Oral daily  insulin lispro (HumaLOG) corrective regimen sliding scale   SubCutaneous three times a day before meals  insulin lispro (HumaLOG) corrective regimen sliding scale   SubCutaneous at bedtime  lisinopril 20 milliGRAM(s) Oral daily  metoprolol succinate ER 25 milliGRAM(s) Oral daily  pantoprazole    Tablet 40 milliGRAM(s) Oral before breakfast  piperacillin/tazobactam IVPB.. 3.375 Gram(s) IV Intermittent every 8 hours    MEDICATIONS  (PRN):  dextrose 40% Gel 15 Gram(s) Oral once PRN Blood Glucose LESS THAN 70 milliGRAM(s)/deciliter  glucagon  Injectable 1 milliGRAM(s) IntraMuscular once PRN Glucose LESS THAN 70 milligrams/deciliter      --------  LABS:                        11.7   20.60 )-----------( 213      ( 2019 09:53 )             41.4     07-11    137  |  104  |  13  ----------------------------<  347<H>  4.4   |  25  |  0.96    Ca    8.6      2019 09:53  Mg     2.1     07-09    TPro  x   /  Alb  x   /  TBili  2.2<H>  /  DBili  .50<H>  /  AST  x   /  ALT  x   /  AlkPhos  x       PT/INR - ( 2019 07:06 )   PT: 27.4 sec;   INR: 2.36 ratio         PTT - ( 10 Jul 2019 07:23 )  PTT:45.8 sec  Urinalysis Basic - ( 2019 21:44 )    Color: Yellow / Appearance: Clear / S.000 / pH: x  Gluc: x / Ketone: Negative  / Bili: Negative / Urobili: Negative   Blood: x / Protein: Negative / Nitrite: Positive   Leuk Esterase: Trace / RBC: 0-2 /HPF / WBC 3-5   Sq Epi: x / Non Sq Epi: Occasional / Bacteria: Occasional      CAPILLARY BLOOD GLUCOSE      POCT Blood Glucose.: 241 mg/dL (2019 12:15)  POCT Blood Glucose.: 243 mg/dL (2019 08:12)  POCT Blood Glucose.: 274 mg/dL (10 Jul 2019 21:00)  POCT Blood Glucose.: 207 mg/dL (10 Jul 2019 17:11)      07-10 @ 09:40   No growth to date.  --  --   @ 23:11   <10,000 CFU/mL Normal Urogenital Gabi  --  --

## 2019-07-11 NOTE — PHYSICAL THERAPY INITIAL EVALUATION ADULT - CRITERIA FOR SKILLED THERAPEUTIC INTERVENTIONS
rehab potential/therapy frequency/anticipated discharge recommendation/functional limitations in following categories/predicted duration of therapy intervention/impairments found/risk reduction/prevention

## 2019-07-11 NOTE — PROGRESS NOTE ADULT - PROBLEM SELECTOR PLAN 7
VTE ppx  - On AC     Monitor and replete electrolytes. Keep K>4.0 and Mg>2.0. *VTE ppx  *Monitor and replete electrolytes. Keep K>4.0 and Mg>2.0.

## 2019-07-11 NOTE — SWALLOW BEDSIDE ASSESSMENT ADULT - SWALLOW EVAL: RECOMMENDED FEEDING/EATING TECHNIQUES
oral hygiene/crush medication (when feasible)/alternate food with liquid/hard swallow w/ each bite or sip/allow for swallow between intakes/maintain upright posture during/after eating for 30 mins/position upright (90 degrees)/small sips/bites

## 2019-07-12 ENCOUNTER — APPOINTMENT (OUTPATIENT)
Dept: CARDIOLOGY | Facility: CLINIC | Age: 84
End: 2019-07-12

## 2019-07-12 LAB
ALBUMIN SERPL ELPH-MCNC: 3.4 G/DL — SIGNIFICANT CHANGE UP (ref 3.3–5)
ALP SERPL-CCNC: 175 U/L — HIGH (ref 40–120)
ALT FLD-CCNC: 21 U/L — SIGNIFICANT CHANGE UP (ref 12–78)
ANION GAP SERPL CALC-SCNC: 6 MMOL/L — SIGNIFICANT CHANGE UP (ref 5–17)
AST SERPL-CCNC: 14 U/L — LOW (ref 15–37)
BASOPHILS # BLD AUTO: 0.16 K/UL — SIGNIFICANT CHANGE UP (ref 0–0.2)
BASOPHILS NFR BLD AUTO: 0.8 % — SIGNIFICANT CHANGE UP (ref 0–2)
BILIRUB DIRECT SERPL-MCNC: 0.3 MG/DL — HIGH (ref 0.05–0.2)
BILIRUB INDIRECT FLD-MCNC: 1.4 MG/DL — HIGH (ref 0.2–1)
BILIRUB SERPL-MCNC: 1.7 MG/DL — HIGH (ref 0.2–1.2)
BUN SERPL-MCNC: 18 MG/DL — SIGNIFICANT CHANGE UP (ref 7–23)
CALCIUM SERPL-MCNC: 9.3 MG/DL — SIGNIFICANT CHANGE UP (ref 8.5–10.1)
CHLORIDE SERPL-SCNC: 105 MMOL/L — SIGNIFICANT CHANGE UP (ref 96–108)
CO2 SERPL-SCNC: 29 MMOL/L — SIGNIFICANT CHANGE UP (ref 22–31)
CREAT SERPL-MCNC: 1.1 MG/DL — SIGNIFICANT CHANGE UP (ref 0.5–1.3)
EOSINOPHIL # BLD AUTO: 0.13 K/UL — SIGNIFICANT CHANGE UP (ref 0–0.5)
EOSINOPHIL NFR BLD AUTO: 0.7 % — SIGNIFICANT CHANGE UP (ref 0–6)
GLUCOSE SERPL-MCNC: 235 MG/DL — HIGH (ref 70–99)
HCT VFR BLD CALC: 42.9 % — SIGNIFICANT CHANGE UP (ref 34.5–45)
HGB BLD-MCNC: 12.1 G/DL — SIGNIFICANT CHANGE UP (ref 11.5–15.5)
IMM GRANULOCYTES NFR BLD AUTO: 2.1 % — HIGH (ref 0–1.5)
INR BLD: 1.89 RATIO — HIGH (ref 0.88–1.16)
LYMPHOCYTES # BLD AUTO: 1.7 K/UL — SIGNIFICANT CHANGE UP (ref 1–3.3)
LYMPHOCYTES # BLD AUTO: 8.8 % — LOW (ref 13–44)
MCHC RBC-ENTMCNC: 24.3 PG — LOW (ref 27–34)
MCHC RBC-ENTMCNC: 28.2 GM/DL — LOW (ref 32–36)
MCV RBC AUTO: 86.1 FL — SIGNIFICANT CHANGE UP (ref 80–100)
MONOCYTES # BLD AUTO: 0.78 K/UL — SIGNIFICANT CHANGE UP (ref 0–0.9)
MONOCYTES NFR BLD AUTO: 4 % — SIGNIFICANT CHANGE UP (ref 2–14)
NEUTROPHILS # BLD AUTO: 16.08 K/UL — HIGH (ref 1.8–7.4)
NEUTROPHILS NFR BLD AUTO: 83.6 % — HIGH (ref 43–77)
NRBC # BLD: 0 /100 WBCS — SIGNIFICANT CHANGE UP (ref 0–0)
PLATELET # BLD AUTO: 203 K/UL — SIGNIFICANT CHANGE UP (ref 150–400)
POTASSIUM SERPL-MCNC: 4.9 MMOL/L — SIGNIFICANT CHANGE UP (ref 3.5–5.3)
POTASSIUM SERPL-SCNC: 4.9 MMOL/L — SIGNIFICANT CHANGE UP (ref 3.5–5.3)
PROT SERPL-MCNC: 6.3 G/DL — SIGNIFICANT CHANGE UP (ref 6–8.3)
PROTHROM AB SERPL-ACNC: 22 SEC — HIGH (ref 10–12.9)
RBC # BLD: 4.98 M/UL — SIGNIFICANT CHANGE UP (ref 3.8–5.2)
RBC # FLD: 18.7 % — HIGH (ref 10.3–14.5)
SODIUM SERPL-SCNC: 140 MMOL/L — SIGNIFICANT CHANGE UP (ref 135–145)
WBC # BLD: 19.26 K/UL — HIGH (ref 3.8–10.5)
WBC # FLD AUTO: 19.26 K/UL — HIGH (ref 3.8–10.5)

## 2019-07-12 PROCEDURE — 99233 SBSQ HOSP IP/OBS HIGH 50: CPT

## 2019-07-12 PROCEDURE — 99233 SBSQ HOSP IP/OBS HIGH 50: CPT | Mod: GC

## 2019-07-12 RX ORDER — FUROSEMIDE 40 MG
40 TABLET ORAL ONCE
Refills: 0 | Status: COMPLETED | OUTPATIENT
Start: 2019-07-12 | End: 2019-07-12

## 2019-07-12 RX ADMIN — PANTOPRAZOLE SODIUM 40 MILLIGRAM(S): 20 TABLET, DELAYED RELEASE ORAL at 05:23

## 2019-07-12 RX ADMIN — HYDROXYUREA 500 MILLIGRAM(S): 500 CAPSULE ORAL at 12:28

## 2019-07-12 RX ADMIN — Medication 1: at 22:34

## 2019-07-12 RX ADMIN — Medication 4: at 12:27

## 2019-07-12 RX ADMIN — Medication 3: at 17:18

## 2019-07-12 RX ADMIN — ATORVASTATIN CALCIUM 40 MILLIGRAM(S): 80 TABLET, FILM COATED ORAL at 22:31

## 2019-07-12 RX ADMIN — Medication 1000 UNIT(S): at 12:27

## 2019-07-12 RX ADMIN — Medication 25 MILLIGRAM(S): at 05:24

## 2019-07-12 RX ADMIN — LISINOPRIL 20 MILLIGRAM(S): 2.5 TABLET ORAL at 05:23

## 2019-07-12 RX ADMIN — Medication 40 MILLIGRAM(S): at 17:18

## 2019-07-12 RX ADMIN — Medication 3: at 08:35

## 2019-07-12 RX ADMIN — ALBUTEROL 2.5 MILLIGRAM(S): 90 AEROSOL, METERED ORAL at 19:47

## 2019-07-12 RX ADMIN — Medication 81 MILLIGRAM(S): at 12:27

## 2019-07-12 RX ADMIN — PIPERACILLIN AND TAZOBACTAM 25 GRAM(S): 4; .5 INJECTION, POWDER, LYOPHILIZED, FOR SOLUTION INTRAVENOUS at 05:24

## 2019-07-12 NOTE — CONSULT NOTE ADULT - ASSESSMENT
This is a 89 year old woman with a history of CAD s/p prior MI with a known chronically occluded RCA, moderate disease in the LCX and LAD that has been managed medically, normal LV function, hypertension, AF on AC with Coumadin, DM who has been having increased dyspnea and presents to the ED with respiratory distress today.    - Unclear etiology of her dyspnea.  Her lungs are clear, and she is saturating well.  She improved on her way to the ED with no specific intervention  - ? if this is related to ischemic heart disease, as she did have a positive nuclear stress test that we elected to manage medically  - Admit to telemetry  - Trend enzymes to rule out ACS  - CT chest non con.  Doubt PE as she is on AC  - work up of positive WBC per primary team  - IF no other etiology found, will consider coronary angiography.  Discussed with patient and son  - Dose Coumadin for goal INR 2-3.  Will need to be held if we choose to send for cath  - Continue Toprol 25 QD  - Continue aspirin, statin , and acei  - Monitor and replete lytes  - To follow closely with you
Leukocytosis.  I find no evidence of infection on the basis of history, exam, labs, or imaging to explain leukocytosis.  Suspect noninfectious in nature, most likely related to underlying myeloproliferative process.  I attempted to reach Dr. Burgess x2 without success.  Suspect she has P. vera or some other SLOAN-2 related disorder.  Indeed, d/w Dr. Sena, WBC in April 2019 was 23.2K, further supporting this notion    Suggestions--  Would stop antibiotics  No ID objection to cardiac catheterization  Discussed with carmen in law at bedside and patient's son x2 by phone.    I'll sign off at this time.    Moisés Sánchez MD  [phone]

## 2019-07-12 NOTE — PROGRESS NOTE ADULT - SUBJECTIVE AND OBJECTIVE BOX
INTERVAL HPI/OVERNIGHT EVENTS:  pt seen and examined  denies n/v/abd pain   reports bm yesterday  ate 50% of breakfast  tbili noted, primarily indirect    MEDICATIONS  (STANDING):  ALBUTerol    0.083% 2.5 milliGRAM(s) Nebulizer every 12 hours  aspirin enteric coated 81 milliGRAM(s) Oral daily  atorvastatin 40 milliGRAM(s) Oral at bedtime  cholecalciferol 1000 Unit(s) Oral daily  dextrose 5%. 1000 milliLiter(s) (50 mL/Hr) IV Continuous <Continuous>  dextrose 50% Injectable 12.5 Gram(s) IV Push once  dextrose 50% Injectable 25 Gram(s) IV Push once  dextrose 50% Injectable 25 Gram(s) IV Push once  hydroxyurea 500 milliGRAM(s) Oral daily  insulin lispro (HumaLOG) corrective regimen sliding scale   SubCutaneous three times a day before meals  insulin lispro (HumaLOG) corrective regimen sliding scale   SubCutaneous at bedtime  lisinopril 20 milliGRAM(s) Oral daily  metoprolol succinate ER 25 milliGRAM(s) Oral daily  pantoprazole    Tablet 40 milliGRAM(s) Oral before breakfast  piperacillin/tazobactam IVPB.. 3.375 Gram(s) IV Intermittent every 8 hours    MEDICATIONS  (PRN):  dextrose 40% Gel 15 Gram(s) Oral once PRN Blood Glucose LESS THAN 70 milliGRAM(s)/deciliter  glucagon  Injectable 1 milliGRAM(s) IntraMuscular once PRN Glucose LESS THAN 70 milligrams/deciliter      Allergies    No Known Allergies    Intolerances        Review of Systems:    General:  No wt loss, fevers, chills, night sweats, fatigue   Eyes:  Good vision, no reported pain  ENT:  No sore throat, pain, runny nose, dysphagia  CV:  No pain, palpitations, hypo/hypertension  Resp:  No dyspnea, cough, tachypnea, wheezing  GI:  No pain, No nausea, No vomiting, No diarrhea, No constipation, No weight loss, No fever, No pruritis, No rectal bleeding, No melena, No dysphagia  :  No pain, bleeding, incontinence, nocturia  Muscle:  No pain, weakness  Neuro:  No weakness, tingling, memory problems  Psych:  No fatigue, insomnia, mood problems, depression  Endocrine:  No polyuria, polydypsia, cold/heat intolerance  Heme:  No petechiae, ecchymosis, easy bruisability  Skin:  No rash, tattoos, scars, edema      Vital Signs Last 24 Hrs  T(C): 36.7 (12 Jul 2019 07:42), Max: 36.9 (11 Jul 2019 23:30)  T(F): 98.1 (12 Jul 2019 07:42), Max: 98.4 (11 Jul 2019 23:30)  HR: 57 (12 Jul 2019 07:42) (55 - 94)  BP: 142/81 (12 Jul 2019 07:42) (128/77 - 162/76)  BP(mean): --  RR: 18 (12 Jul 2019 07:42) (18 - 18)  SpO2: 99% (12 Jul 2019 07:42) (94% - 99%)    PHYSICAL EXAM:    Constitutional: NAD  HEENT: ncat  Neck: No LAD  Respiratory: dec bs  Cardiovascular: S1 and S2, RRR  Gastrointestinal: soft nt mild dt  Extremities: No peripheral edema  Vascular: 2+ peripheral pulses  Neurological: awake alert  Skin: No rashes      LABS:                        12.1   19.26 )-----------( 203      ( 12 Jul 2019 06:59 )             42.9     07-12    140  |  105  |  18  ----------------------------<  235<H>  4.9   |  29  |  1.10    Ca    9.3      12 Jul 2019 06:59    TPro  6.3  /  Alb  3.4  /  TBili  1.7<H>  /  DBili  .30<H>  /  AST  14<L>  /  ALT  21  /  AlkPhos  175<H>  07-12    PT/INR - ( 12 Jul 2019 06:59 )   PT: 22.0 sec;   INR: 1.89 ratio               RADIOLOGY & ADDITIONAL TESTS:

## 2019-07-12 NOTE — PROGRESS NOTE ADULT - ASSESSMENT
89F w/pmh of Afib on coumadin, Myeloproliferative disorder on hydroxyurea, HTN, HLD, CAD s/p prior MI (8 years ago) with a known chronically occluded RCA, moderate disease in LCX and LAD managed medically, normal LV and Type 2 DM not on insulin presenting with episode of worsening dyspnea on exertion sec to worsening CAD requriing cath and resolving abdominal pain from acute diverticulosis. 89F w/pmh of Afib on coumadin, Myeloproliferative disorder on hydroxyurea, HTN, HLD, CAD s/p prior MI (8 years ago) with a known chronically occluded RCA, moderate disease in LCX and LAD managed medically, normal LV and Type 2 DM not on insulin presenting with episode of worsening dyspnea on exertion sec to worsening CAD requriing cath and resolving abdominal pain from acute diverticulosis.              - Unremarkable CXR  - Echo: EF 60%,       -acute  -CXR negative  *Cardiac enzymes negative x 3  *CT chest negative for pleural effusion or volume overload. Ground glass opacities noted.  *possible s/p angina. H/o positive nuclear stress test per cardio. 89F w/pmh of Afib on coumadin, Myeloproliferative disorder on hydroxyurea, HTN, HLD, CAD s/p prior MI (8 years ago) with a known chronically occluded RCA, moderate disease in LCX and LAD managed medically, normal LV and Type 2 DM not on insulin presenting with episode of worsening dyspnea on exertion sec to worsening CAD requriing cath and resolving abdominal pain  diverticulosis.

## 2019-07-12 NOTE — PROGRESS NOTE ADULT - ASSESSMENT
This is a 89 year old woman with a history of CAD s/p prior MI with a known chronically occluded RCA, moderate disease in the LCX and LAD that has been managed medically, normal LV function, hypertension, AF on AC with Coumadin, DM who has been having increased dyspnea and presents to the ED with respiratory distress and abdominal pain.    MUNOZ  - Unclear etiology of her dyspnea this could be her angina equivalent..    -   she did have a positive nuclear stress test that we elected to manage medically and has know residual CAD  - Her CE's x 2 sets are negative  No further need to trend  - CT chest not reflective of volume overload/pleural effusion, however, groundglass opacity noted  - Her CxR is unremarkable  -CW telemetry    CAD  - No evidence of acute ischemia per EKG  - Continue ASA, BB, ACEI, and statin  - No further cardiac enzymes needed at this point  - IF no other etiology found, will consider coronary angiography.  Discussed with patient and son  -TTE pending interpretation     Atrial fibrillation  - Her rate is controlled  - Would hold coumadin in anticipation of CC  - Continue Toprol 25 QD  - Monitor and replete lytes    HTN  - Continue BB and ACEI    VTE ppx  - Start hep injection subcutaneous once INR below 2    Monitor and replete electrolytes. Keep K>4.0 and Mg>2.0.   Further cardiac workup pending clinical course  To follow closely with you  Ralf Jeronimo [facility]  Cardiology   Spectra #[phone] This is a 89 year old woman with a history of CAD s/p prior MI with a known chronically occluded RCA, moderate disease in the LCX and LAD that has been managed medically, normal LV function, hypertension, AF on AC with Coumadin, DM who has been having increased dyspnea and presents to the ED with respiratory distress and abdominal pain.    MUNOZ  - Unclear etiology of her dyspnea this could be her angina equivalent..    -   she did have a positive nuclear stress test that we elected to manage medically and has know residual CAD  - Her CE's x 2 sets are negative  No further need to trend  - echo with normal LV function but signs of elevated filling pressures and large IVC.   - Will try empiric diuresis and see if improves  - Lasix 40mg IV x 1   -CW telemetry    CAD  - No evidence of acute ischemia per EKG  - Continue ASA, BB, ACEI, and statin  - No further cardiac enzymes needed at this point  - IF no other etiology found, will consider coronary angiography.  Discussed with patient and son  -TTE pending interpretation     Atrial fibrillation  - Her rate is controlled  - Would hold coumadin in anticipation of CC  - Continue Toprol 25 QD  - Monitor and replete lytes    HTN  - Continue BB and ACEI    VTE ppx  - Start hep injection subcutaneous once INR below 2    Monitor and replete electrolytes. Keep K>4.0 and Mg>2.0.   Further cardiac workup pending clinical course  To follow closely with you  Ralf Jeronimo [facility]  Cardiology   Spectra #[phone]

## 2019-07-12 NOTE — PROGRESS NOTE ADULT - PROBLEM SELECTOR PLAN 1
-acute  -CXR negative  *Cardiac enzymes negative x 3  *CT chest negative for pleural effusion or volume overload. Ground glass opacities noted.  *possible s/p angina. H/o positive nuclear stress test per cardio. - Unremarkable CXR  - Echo: EF 60%, Severe pulmonary hypertension  - Cardiac enzymes negative x 3  - CT chest negative for pleural effusion or volume overload  - possible s/p angina. H/o positive nuclear stress test per cardio. - acute, unresolved  -  Unremarkable CXR  - Echo: EF 60%, Severe pulmonary hypertension  - Cardiac enzymes negative x 3  - CT chest negative for pleural effusion or volume overload  - possible s/p angina. H/o positive nuclear stress test per cardio.

## 2019-07-12 NOTE — PROGRESS NOTE ADULT - PROBLEM SELECTOR PLAN 1
asymptomatic, tolerating po  no concerning pathology seen on CT  bld cxs ngtd   diet as tolerated  monitor abdominal exam  if uptrend in lfts/ruq pain, check us  will follow

## 2019-07-12 NOTE — PROGRESS NOTE ADULT - SUBJECTIVE AND OBJECTIVE BOX
[facility] -- Elida Douglas, Ayaz Macias Pannella, Patel, Savella  Office # [phone]      Follow Up:    SOB Dyspnea   Subjective/Observations:   No events overnight resting comfortably in bed.  No complaints of chest pain, dyspnea, or palpitations reported. No signs of orthopnea or PND.     REVIEW OF SYSTEMS: All other review of systems is negative unless indicated above    PAST MEDICAL & SURGICAL HISTORY:  Myeloproliferative disease  Anal fissure  Elevated red blood cell count  Atrial fibrillation: on coumadin  Skin cancer: of back - removed  Type 2 diabetes mellitus: not on insulin  Hypercholesterolemia  HTN (hypertension)  Myocardial infarction: 8 yrs ago  Hearing loss  S/P ORIF (open reduction internal fixation) fracture: right hip pinning  - 5 yrs ago  History of cataract surgery: 03/2016,  06/2016      MEDICATIONS  (STANDING):  ALBUTerol    0.083% 2.5 milliGRAM(s) Nebulizer every 12 hours  aspirin enteric coated 81 milliGRAM(s) Oral daily  atorvastatin 40 milliGRAM(s) Oral at bedtime  cholecalciferol 1000 Unit(s) Oral daily  dextrose 5%. 1000 milliLiter(s) (50 mL/Hr) IV Continuous <Continuous>  dextrose 50% Injectable 12.5 Gram(s) IV Push once  dextrose 50% Injectable 25 Gram(s) IV Push once  dextrose 50% Injectable 25 Gram(s) IV Push once  hydroxyurea 500 milliGRAM(s) Oral daily  insulin lispro (HumaLOG) corrective regimen sliding scale   SubCutaneous three times a day before meals  insulin lispro (HumaLOG) corrective regimen sliding scale   SubCutaneous at bedtime  lisinopril 20 milliGRAM(s) Oral daily  metoprolol succinate ER 25 milliGRAM(s) Oral daily  pantoprazole    Tablet 40 milliGRAM(s) Oral before breakfast  piperacillin/tazobactam IVPB.. 3.375 Gram(s) IV Intermittent every 8 hours    MEDICATIONS  (PRN):  dextrose 40% Gel 15 Gram(s) Oral once PRN Blood Glucose LESS THAN 70 milliGRAM(s)/deciliter  glucagon  Injectable 1 milliGRAM(s) IntraMuscular once PRN Glucose LESS THAN 70 milligrams/deciliter      Allergies    No Known Allergies    Intolerances        Vital Signs Last 24 Hrs  T(C): 36.7 (12 Jul 2019 07:42), Max: 36.9 (11 Jul 2019 23:30)  T(F): 98.1 (12 Jul 2019 07:42), Max: 98.4 (11 Jul 2019 23:30)  HR: 57 (12 Jul 2019 07:42) (55 - 94)  BP: 142/81 (12 Jul 2019 07:42) (128/77 - 162/76)  BP(mean): --  RR: 18 (12 Jul 2019 07:42) (18 - 18)  SpO2: 99% (12 Jul 2019 07:42) (94% - 99%)    I&O's Summary        PHYSICAL EXAM:  TELE: Afib   Constitutional: NAD, awake and alert, well-developed  HEENT: Moist Mucous Membranes, Anicteric  Pulmonary: Non-labored, breath sounds are clear bilaterally, No wheezing, crackles or rhonchi  Cardiovascular: Regular, S1 and S2 nl, No murmurs, rubs, gallops or clicks  Gastrointestinal: Bowel Sounds present, soft, nontender.   Lymph: No lymphadenopathy. No peripheral edema.  Skin: No visible rashes or ulcers.  Psych:  Mood & affect appropriate    LABS: All Labs Reviewed:                        12.1   19.26 )-----------( 203      ( 12 Jul 2019 06:59 )             42.9                         11.7   20.60 )-----------( 213      ( 11 Jul 2019 09:53 )             41.4                         11.6   18.34 )-----------( 243      ( 10 Jul 2019 07:23 )             41.1     12 Jul 2019 06:59    140    |  105    |  18     ----------------------------<  235    4.9     |  29     |  1.10   11 Jul 2019 09:53    137    |  104    |  13     ----------------------------<  347    4.4     |  25     |  0.96   10 Jul 2019 07:23    141    |  107    |  12     ----------------------------<  200    4.1     |  26     |  0.85     Ca    9.3        12 Jul 2019 06:59  Ca    8.6        11 Jul 2019 09:53  Ca    8.9        10 Jul 2019 07:23  Mg     2.1       09 Jul 2019 18:04    TPro  6.3    /  Alb  3.4    /  TBili  1.7    /  DBili  .30    /  AST  14     /  ALT  21     /  AlkPhos  175    12 Jul 2019 06:59  TPro  x      /  Alb  x      /  TBili  2.2    /  DBili  .50    /  AST  x      /  ALT  x      /  AlkPhos  x      09 Jul 2019 23:40  TPro  6.7    /  Alb  3.7    /  TBili  1.8    /  DBili  x      /  AST  12     /  ALT  20     /  AlkPhos  189    09 Jul 2019 18:04    PT/INR - ( 12 Jul 2019 06:59 )   PT: 22.0 sec;   INR: 1.89 ratio           ECG:  < from: 12 Lead ECG (07.09.19 @ 18:13) >  Ventricular Rate 68 BPM    Atrial Rate 234 BPM    QRS Duration 76 ms    Q-T Interval 402 ms    QTC Calculation(Bezet) 427 ms    R Axis -2 degrees    T Axis -17 degrees    Diagnosis Line Atrial fibrillation  T wave abnormality, consider inferior ischemia  Abnormal ECG  When compared with ECG of 14-DEC-2012 14:23,  No significant change was found  Confirmed by Gilberto SERRA, Ralf ([id number]) on 7/10/2019 12:01:24 PM    < end of copied text >    Echo:  pending interpretation   Radiology:  < from: CT Chest w/ IV Cont (07.09.19 @ 20:12) >  EXAM:  CT ABDOMEN AND PELVIS OC IC                          EXAM:  CT CHEST IC                            PROCEDURE DATE:  07/09/2019          INTERPRETATION:  CLINICAL INFORMATION: Shortness of breath. Left lower   quadrant abdominal pain and diarrhea.    COMPARISON: None.    PROCEDURE:   CT of the Chest, Abdomen and Pelvis was performed with intravenous   contrast.   Intravenous contrast: 100 ml Omnipaque 350. 0 ml discarded.  Oral contrast: Positive contrast was administered.  Sagittal and coronal reformats were performed.    FINDINGS:    CHEST:     LUNGS AND LARGE AIRWAYS: Patent central airways. Geographic areas of mild   groundglass air space opacity, may reflect air trapping. 3 mm left lower   lobe pulmonary nodule (series 2, image 56). In the absence of known risk   factors, no further routine follow-up is recommended.  PLEURA: No pleural effusion.  VESSELS: Atherosclerosis of the thoracic aorta which is otherwise normal   in caliber.  HEART: Heart size is normal. No pericardialeffusion. Aortic valvular,   coronary artery, and mitral annular calcification.  MEDIASTINUM AND RASTA: Numerous small volume mediastinal and bilateral   hilar nodes.  CHEST WALL AND LOWER NECK: Within normal limits.    ABDOMEN AND PELVIS:    LIVER: Within normal limits.  BILE DUCTS: Normal caliber.  GALLBLADDER: Within normal limits.  SPLEEN: Splenomegaly measuring up to 16.0 cm in the AP dimension..  PANCREAS: Within normal limits.  ADRENALS: Within normal limits.  KIDNEYS/URETERS: Bilateral renal cysts and low-attenuation lesions too   small to accurately characterize. No hydronephrosis.    BLADDER: Within normal limits.  REPRODUCTIVE ORGANS: Uterus and adnexa within normal limits.    BOWEL: No bowel obstruction. Colonic diverticulosis without evidence of   acute diverticulitis. Appendix is normal.  PERITONEUM: No ascites.  VESSELS:  Atherosclerosis of the abdominal aorta and its branch vessels.  RETROPERITONEUM: No lymphadenopathy.    ABDOMINAL WALL: Within normal limits.  BONES: Status post open reduction internal fixation of the right hip.   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 Multilevel degenerative changes   of the spine.    IMPRESSION:     Nonspecific geographic groundglass airspace opacity, suggestive of air   trapping.    Numerous predominantly small volume mediastinal and bilateral hilar   nodes, of unclear etiology.    Colonic diverticulosis without diverticulitis or evidence of acute   inflammation. No bowel obstruction.    Splenomegaly.      ANA NEWTON, ATTENDING RADIOLOGIST  This document has been electronically signed. Jul 9 2019  8:57PM        < end of copied text >       Ralf Jeronimo ANP   Cardiology [facility] -- Elida Douglas, Ayaz Macias Pannella, Patel, Savella  Office # [phone]      Follow Up:    SOB Dyspnea     Subjective/Observations:   No events overnight resting comfortably in bed.  No complaints of chest pain, dyspnea, or palpitations reported. No signs of orthopnea or PND.     REVIEW OF SYSTEMS: All other review of systems is negative unless indicated above    PAST MEDICAL & SURGICAL HISTORY:  Myeloproliferative disease  Anal fissure  Elevated red blood cell count  Atrial fibrillation: on coumadin  Skin cancer: of back - removed  Type 2 diabetes mellitus: not on insulin  Hypercholesterolemia  HTN (hypertension)  Myocardial infarction: 8 yrs ago  Hearing loss  S/P ORIF (open reduction internal fixation) fracture: right hip pinning  - 5 yrs ago  History of cataract surgery: 03/2016,  06/2016      MEDICATIONS  (STANDING):  ALBUTerol    0.083% 2.5 milliGRAM(s) Nebulizer every 12 hours  aspirin enteric coated 81 milliGRAM(s) Oral daily  atorvastatin 40 milliGRAM(s) Oral at bedtime  cholecalciferol 1000 Unit(s) Oral daily  dextrose 5%. 1000 milliLiter(s) (50 mL/Hr) IV Continuous <Continuous>  dextrose 50% Injectable 12.5 Gram(s) IV Push once  dextrose 50% Injectable 25 Gram(s) IV Push once  dextrose 50% Injectable 25 Gram(s) IV Push once  hydroxyurea 500 milliGRAM(s) Oral daily  insulin lispro (HumaLOG) corrective regimen sliding scale   SubCutaneous three times a day before meals  insulin lispro (HumaLOG) corrective regimen sliding scale   SubCutaneous at bedtime  lisinopril 20 milliGRAM(s) Oral daily  metoprolol succinate ER 25 milliGRAM(s) Oral daily  pantoprazole    Tablet 40 milliGRAM(s) Oral before breakfast  piperacillin/tazobactam IVPB.. 3.375 Gram(s) IV Intermittent every 8 hours    MEDICATIONS  (PRN):  dextrose 40% Gel 15 Gram(s) Oral once PRN Blood Glucose LESS THAN 70 milliGRAM(s)/deciliter  glucagon  Injectable 1 milliGRAM(s) IntraMuscular once PRN Glucose LESS THAN 70 milligrams/deciliter      Allergies    No Known Allergies    Intolerances        Vital Signs Last 24 Hrs  T(C): 36.7 (12 Jul 2019 07:42), Max: 36.9 (11 Jul 2019 23:30)  T(F): 98.1 (12 Jul 2019 07:42), Max: 98.4 (11 Jul 2019 23:30)  HR: 57 (12 Jul 2019 07:42) (55 - 94)  BP: 142/81 (12 Jul 2019 07:42) (128/77 - 162/76)  BP(mean): --  RR: 18 (12 Jul 2019 07:42) (18 - 18)  SpO2: 99% (12 Jul 2019 07:42) (94% - 99%)    I&O's Summary        PHYSICAL EXAM:  TELE: Afib   Constitutional: NAD, awake and alert, well-developed  HEENT: Moist Mucous Membranes, Anicteric  Pulmonary: Non-labored, breath sounds are clear bilaterally, No wheezing, crackles or rhonchi  Cardiovascular: IRRR , S1 and S2 nl, No murmurs, rubs, gallops or clicks  Gastrointestinal: Bowel Sounds present, soft, nontender.   Lymph: No lymphadenopathy. No peripheral edema.  Skin: No visible rashes or ulcers.  Psych:  Mood & affect appropriate    LABS: All Labs Reviewed:                        12.1   19.26 )-----------( 203      ( 12 Jul 2019 06:59 )             42.9                         11.7   20.60 )-----------( 213      ( 11 Jul 2019 09:53 )             41.4                         11.6   18.34 )-----------( 243      ( 10 Jul 2019 07:23 )             41.1     12 Jul 2019 06:59    140    |  105    |  18     ----------------------------<  235    4.9     |  29     |  1.10   11 Jul 2019 09:53    137    |  104    |  13     ----------------------------<  347    4.4     |  25     |  0.96   10 Jul 2019 07:23    141    |  107    |  12     ----------------------------<  200    4.1     |  26     |  0.85     Ca    9.3        12 Jul 2019 06:59  Ca    8.6        11 Jul 2019 09:53  Ca    8.9        10 Jul 2019 07:23  Mg     2.1       09 Jul 2019 18:04    TPro  6.3    /  Alb  3.4    /  TBili  1.7    /  DBili  .30    /  AST  14     /  ALT  21     /  AlkPhos  175    12 Jul 2019 06:59  TPro  x      /  Alb  x      /  TBili  2.2    /  DBili  .50    /  AST  x      /  ALT  x      /  AlkPhos  x      09 Jul 2019 23:40  TPro  6.7    /  Alb  3.7    /  TBili  1.8    /  DBili  x      /  AST  12     /  ALT  20     /  AlkPhos  189    09 Jul 2019 18:04    PT/INR - ( 12 Jul 2019 06:59 )   PT: 22.0 sec;   INR: 1.89 ratio           ECG:  < from: 12 Lead ECG (07.09.19 @ 18:13) >  Ventricular Rate 68 BPM    Atrial Rate 234 BPM    QRS Duration 76 ms    Q-T Interval 402 ms    QTC Calculation(Bezet) 427 ms    R Axis -2 degrees    T Axis -17 degrees    Diagnosis Line Atrial fibrillation  T wave abnormality, consider inferior ischemia  Abnormal ECG  When compared with ECG of 14-DEC-2012 14:23,  No significant change was found  Confirmed by Gilberto SERRA, Ralf ([id number]) on 7/10/2019 12:01:24 PM    < end of copied text >    Echo:  pending interpretation   Radiology:  < from: CT Chest w/ IV Cont (07.09.19 @ 20:12) >  EXAM:  CT ABDOMEN AND PELVIS OC IC                          EXAM:  CT CHEST IC                            PROCEDURE DATE:  07/09/2019          INTERPRETATION:  CLINICAL INFORMATION: Shortness of breath. Left lower   quadrant abdominal pain and diarrhea.    COMPARISON: None.    PROCEDURE:   CT of the Chest, Abdomen and Pelvis was performed with intravenous   contrast.   Intravenous contrast: 100 ml Omnipaque 350. 0 ml discarded.  Oral contrast: Positive contrast was administered.  Sagittal and coronal reformats were performed.    FINDINGS:    CHEST:     LUNGS AND LARGE AIRWAYS: Patent central airways. Geographic areas of mild   groundglass air space opacity, may reflect air trapping. 3 mm left lower   lobe pulmonary nodule (series 2, image 56). In the absence of known risk   factors, no further routine follow-up is recommended.  PLEURA: No pleural effusion.  VESSELS: Atherosclerosis of the thoracic aorta which is otherwise normal   in caliber.  HEART: Heart size is normal. No pericardialeffusion. Aortic valvular,   coronary artery, and mitral annular calcification.  MEDIASTINUM AND RASTA: Numerous small volume mediastinal and bilateral   hilar nodes.  CHEST WALL AND LOWER NECK: Within normal limits.    ABDOMEN AND PELVIS:    LIVER: Within normal limits.  BILE DUCTS: Normal caliber.  GALLBLADDER: Within normal limits.  SPLEEN: Splenomegaly measuring up to 16.0 cm in the AP dimension..  PANCREAS: Within normal limits.  ADRENALS: Within normal limits.  KIDNEYS/URETERS: Bilateral renal cysts and low-attenuation lesions too   small to accurately characterize. No hydronephrosis.    BLADDER: Within normal limits.  REPRODUCTIVE ORGANS: Uterus and adnexa within normal limits.    BOWEL: No bowel obstruction. Colonic diverticulosis without evidence of   acute diverticulitis. Appendix is normal.  PERITONEUM: No ascites.  VESSELS:  Atherosclerosis of the abdominal aorta and its branch vessels.  RETROPERITONEUM: No lymphadenopathy.    ABDOMINAL WALL: Within normal limits.  BONES: Status post open reduction internal fixation of the right hip.   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 Multilevel degenerative changes   of the spine.    IMPRESSION:     Nonspecific geographic groundglass airspace opacity, suggestive of air   trapping.    Numerous predominantly small volume mediastinal and bilateral hilar   nodes, of unclear etiology.    Colonic diverticulosis without diverticulitis or evidence of acute   inflammation. No bowel obstruction.    Splenomegaly.      ANA NEWTON, ATTENDING RADIOLOGIST  This document has been electronically signed. Jul 9 2019  8:57PM        < end of copied text >   < from: TTE Echo Doppler w/o Cont (07.11.19 @ 11:37) >     EXAM:  ECHO TTE WO CON COMP W DOPPLR         PROCEDURE DATE:  07/11/2019        INTERPRETATION:  INDICATION: dyspnea  Referring M.DPepe:Joe  Blood Pressure 126/75        Weight (kg) :55     Height (cm):162       BSA (sq m): 1.6  Technician: PH    Dimensions:    LA 4.3       Normal Values: 2.0 - 4.0 cm    Ao 2.7        Normal Values: 2.0 - 3.8 cm  SEPTUM 1.1       Normal Values: 0.6 - 1.2 cm  PWT 1.1       Normal Values: 0.6 - 1.1 cm  LVIDd 4.0         Normal Values: 3.0 - 5.6 cm  LVIDs 2.0       Normal Values: 1.8 - 4.0 cm      OBSERVATIONS:  Technically difficult study  Mitral Valve: Dense mitral annular calcification and calcified mitral   leaflets. Mild mitral regurgitation.  Aortic Valve/Aorta: Normal trileaflet aortic valve.  Tricuspid Valve: Normal tricuspid valve. Mild tricuspid regurgitation.  Pulmonic Valve: The pulmonic valve is not well visualized. Probably   normal.  Left Atrium: Severe enlargement  Right Atrium: Mild enlargement  Left Ventricle: Overall preserved leftventricular systolic function. The   ejection fraction is approximately 60%.  Right Ventricle: Normal right ventricular size and function.  Pericardium/Pleura: No pericardial effusion noted.  Pulmonary/RV Pressure: The right ventricular systolic pressure is   estimated to be 61mmHg, assuming that the right atrial pressure is   estimated to be 20 mmHg. This is consistent with pulmonary hypertension.   The IVC is extremely dilated (2.8cm) and appears noncollapsible. The   right atrial pressure is estimated to be 15 to 20 mmHg.  LV Diastolic Function: E/e' significant elevated consistent with   increased left ventricular filling pressures    Conclusion: Technically difficult study. Overall preserved left   ventricular systolic function. EF 60%. Elevated left ventricular filling   pressures. Severe pulmonary hypertension with severely elevated right   atrial pressures based on an IVC diameter of 2.8 cm that is   noncollapsible.                  TON EDWARDS M.D., ATTENDING CARDIOLOGIST  This document has been electronically signed. Jul 12 2019  1:54PM                < end of copied text >       Ralf Jeronimo [facility]   Cardiology

## 2019-07-12 NOTE — PROGRESS NOTE ADULT - PROBLEM SELECTOR PLAN 1
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

## 2019-07-12 NOTE — CONSULT NOTE ADULT - SUBJECTIVE AND OBJECTIVE BOX
[facility], Division of Infectious Diseases  JOHANNA Quiñones A. Lee    NEO, JOURDAN  89y, Female  [id number]    HPI--  89F presented with shortness of breath along with abdominal discomfort. Patient with known abnormal stress test but presumably due to age she was managed medically. Her SOB is now resolved, and further cardiac workup is planned (cardiac catheterization). Patient's abdominal discomfort resolved and CT A/P unrevealing.    Patient has persistent leukocytosis here, despite empiric antibiotics.       PMH/PSH--  Myeloproliferative disease  Anal fissure  Elevated red blood cell count  Atrial fibrillation  Skin cancer  Type 2 diabetes mellitus  Hypercholesterolemia  HTN (hypertension)  Myocardial infarction  Hearing loss  S/P ORIF (open reduction internal fixation) fracture  History of cataract surgery      Allergies-- NKDA      Medications--  Antibiotics: piperacillin/tazobactam IVPB.. 3.375 Gram(s) IV Intermittent every 8 hours    Immunologic:   Other: ALBUTerol    0.083%  aspirin enteric coated  atorvastatin  cholecalciferol  dextrose 40% Gel PRN  dextrose 5%.  dextrose 50% Injectable  dextrose 50% Injectable  dextrose 50% Injectable  glucagon  Injectable PRN  hydroxyurea  insulin lispro (HumaLOG) corrective regimen sliding scale  insulin lispro (HumaLOG) corrective regimen sliding scale  lisinopril  metoprolol succinate ER  pantoprazole    Tablet      Social History--  EtOH: denies   Tobacco: denies   Drug Use: denies     Family/Marital History--  Family history of cancer (Sibling)  Family history of heart failure  Family history of cancer    Remainder not relevant to clinical concern.      Review of Systems:  A >=10-point review of systems was obtained.   Review of systems otherwise negative except as previously noted, though not clear that she is entirely reliable.    Physical Exam--  Vital Signs: T(F): 97.9 (07-12-19 @ 12:25), Max: 98.4 (07-11-19 @ 23:30)  HR: 68 (07-12-19 @ 12:25)  BP: 131/67 (07-12-19 @ 12:25)  RR: 18 (07-12-19 @ 12:25)  SpO2: 98% (07-12-19 @ 12:25)  Wt(kg): --  General: Frail but nontoxic-appearing Female in no acute distress.  HEENT: AT/NC. Anicteric. Conjunctiva pink and moist. Oropharynx clear.  Neck: Not rigid. No sense of mass.  Nodes: None palpable.  Lungs: Diminished breath soubds bilaterally without rales, wheezing or rhonchi  Heart: Regular rate and rhythm. No Murmur. No rub. No gallop. No palpable thrill.  Abdomen: Bowel sounds present and normoactive. Soft. Nondistended. Nontender. No sense of mass. No organomegaly.  Back: No spinal tenderness. No costovertebral angle tenderness.   Extremities: No cyanosis or clubbing. No edema.   Skin: Warm. Dry. Good turgor. No rash. No vasculitic stigmata.  Psychiatric: Appropriate affect and mood for situation.         Laboratory & Imaging Data--  CBC                        12.1   19.26 )-----------( 203      ( 12 Jul 2019 06:59 )             42.9       Chemistries  07-12    140  |  105  |  18  ----------------------------<  235<H>  4.9   |  29  |  1.10    Ca    9.3      12 Jul 2019 06:59    TPro  6.3  /  Alb  3.4  /  TBili  1.7<H>  /  DBili  .30<H>  /  AST  14<L>  /  ALT  21  /  AlkPhos  175<H>  07-12      Culture Data  Culture - Blood (collected 10 Jul 2019 09:40)  Source: .Blood Blood-Peripheral  Preliminary Report (11 Jul 2019 10:00):    No growth to date.    Culture - Blood (collected 10 Jul 2019 09:40)  Source: .Blood Blood  Preliminary Report (11 Jul 2019 10:00):    No growth to date.    Culture - Urine (collected 09 Jul 2019 23:11)  Source: .Urine Clean Catch (Midstream)  Final Report (10 Jul 2019 22:35):    <10,000 CFU/mL Normal Urogenital Gabi    < from: CT Chest w/ IV Cont (07.09.19 @ 20:12) >  EXAM:  CT ABDOMEN AND PELVIS OC IC                        EXAM:  CT CHEST IC                        PROCEDURE DATE:  07/09/2019    INTERPRETATION:  CLINICAL INFORMATION: Shortness of breath. Left lower   quadrant abdominal pain and diarrhea.    COMPARISON: None.    PROCEDURE:   CT of the Chest, Abdomen and Pelvis was performed with intravenous   contrast.   Intravenous contrast: 100 ml Omnipaque 350. 0 ml discarded.  Oral contrast: Positive contrast was administered.  Sagittal and coronal reformats were performed.    FINDINGS:    CHEST:     LUNGS AND LARGE AIRWAYS: Patent central airways. Geographic areas of mild   groundglass air space opacity, may reflect air trapping. 3 mm left lower   lobe pulmonary nodule (series 2, image 56). In the absence of known risk   factors, no further routine follow-up is recommended.  PLEURA: No pleural effusion.  VESSELS: Atherosclerosis of the thoracic aorta which is otherwise normal   in caliber.  HEART: Heart size is normal. No pericardialeffusion. Aortic valvular,   coronary artery, and mitral annular calcification.  MEDIASTINUM AND RASTA: Numerous small volume mediastinal and bilateral   hilar nodes.  CHEST WALL AND LOWER NECK: Within normal limits.    ABDOMEN AND PELVIS:    LIVER: Within normal limits.  BILE DUCTS: Normal caliber.  GALLBLADDER: Within normal limits.  SPLEEN: Splenomegaly measuring up to 16.0 cm in the AP dimension..  PANCREAS: Within normal limits.  ADRENALS: Within normal limits.  KIDNEYS/URETERS: Bilateral renal cysts and low-attenuation lesions too   small to accurately characterize. No hydronephrosis.    BLADDER: Within normal limits.  REPRODUCTIVE ORGANS: Uterus and adnexa within normal limits.    BOWEL: No bowel obstruction. Colonic diverticulosis without evidence of   acute diverticulitis. Appendix is normal.  PERITONEUM: No ascites.  VESSELS:  Atherosclerosis of the abdominal aorta and its branch vessels.  RETROPERITONEUM: No lymphadenopathy.    ABDOMINAL WALL: Within normal limits.  BONES: Status post open reduction internal fixation of the right hip.   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 Multilevel degenerative changes   of the spine.    IMPRESSION:     Nonspecific geographic groundglass airspace opacity, suggestive of air   trapping.    Numerous predominantly small volume mediastinal and bilateral hilar   nodes, of unclear etiology.    Colonic diverticulosis without diverticulitis or evidence of acute   inflammation. No bowel obstruction.    Splenomegaly.      ANA NEWTON, ATTENDING RADIOLOGIST  This document has been electronically signed. Jul 9 2019  8:57PM   < end of copied text >

## 2019-07-12 NOTE — PROGRESS NOTE ADULT - SUBJECTIVE AND OBJECTIVE BOX
Date/Time Patient Seen:  		  Referring MD:   Data Reviewed	       Patient is a 89y old  Female who presents with a chief complaint of sob/abdominal pain (11 Jul 2019 13:23)      Subjective/HPI     PAST MEDICAL & SURGICAL HISTORY:  Myeloproliferative disease  Anal fissure  Elevated red blood cell count  Atrial fibrillation: on coumadin  Skin cancer: of back - removed  Type 2 diabetes mellitus: not on insulin  Hypercholesterolemia  HTN (hypertension)  Myocardial infarction: 8 yrs ago  Hearing loss  S/P ORIF (open reduction internal fixation) fracture: right hip pinning  - 5 yrs ago  History of cataract surgery: 03/2016,  06/2016        Medication list         MEDICATIONS  (STANDING):  ALBUTerol    0.083% 2.5 milliGRAM(s) Nebulizer every 12 hours  aspirin enteric coated 81 milliGRAM(s) Oral daily  atorvastatin 40 milliGRAM(s) Oral at bedtime  cholecalciferol 1000 Unit(s) Oral daily  dextrose 5%. 1000 milliLiter(s) (50 mL/Hr) IV Continuous <Continuous>  dextrose 50% Injectable 12.5 Gram(s) IV Push once  dextrose 50% Injectable 25 Gram(s) IV Push once  dextrose 50% Injectable 25 Gram(s) IV Push once  hydroxyurea 500 milliGRAM(s) Oral daily  insulin lispro (HumaLOG) corrective regimen sliding scale   SubCutaneous three times a day before meals  insulin lispro (HumaLOG) corrective regimen sliding scale   SubCutaneous at bedtime  lisinopril 20 milliGRAM(s) Oral daily  metoprolol succinate ER 25 milliGRAM(s) Oral daily  pantoprazole    Tablet 40 milliGRAM(s) Oral before breakfast  piperacillin/tazobactam IVPB.. 3.375 Gram(s) IV Intermittent every 8 hours    MEDICATIONS  (PRN):  dextrose 40% Gel 15 Gram(s) Oral once PRN Blood Glucose LESS THAN 70 milliGRAM(s)/deciliter  glucagon  Injectable 1 milliGRAM(s) IntraMuscular once PRN Glucose LESS THAN 70 milligrams/deciliter         Vitals log        ICU Vital Signs Last 24 Hrs  T(C): 36.9 (12 Jul 2019 04:42), Max: 36.9 (11 Jul 2019 23:30)  T(F): 98.4 (12 Jul 2019 04:42), Max: 98.4 (11 Jul 2019 23:30)  HR: 63 (12 Jul 2019 05:24) (55 - 94)  BP: 129/77 (12 Jul 2019 04:42) (126/75 - 162/76)  BP(mean): --  ABP: --  ABP(mean): --  RR: 18 (12 Jul 2019 04:42) (18 - 18)  SpO2: 94% (12 Jul 2019 04:42) (94% - 98%)           Input and Output:  I&O's Detail      Lab Data                        12.1   19.26 )-----------( 203      ( 12 Jul 2019 06:59 )             42.9     07-11    137  |  104  |  13  ----------------------------<  347<H>  4.4   |  25  |  0.96    Ca    8.6      11 Jul 2019 09:53              Review of Systems	      Objective     Physical Examination      heart s1s2  lung dec BS  abd soft  on room air  verbal  hard of hearing      Pertinent Lab findings & Imaging      Shira:  NO   Adequate UO     I&O's Detail           Discussed with:     Cultures:	        Radiology

## 2019-07-12 NOTE — PROGRESS NOTE ADULT - SUBJECTIVE AND OBJECTIVE BOX
***INCOMPLETE    Patient is a 89y old  Female who presents with a chief complaint of sob/abdominal pain (12 Jul 2019 10:27)      INTERVAL HPI:  OVERNIGHT EVENTS:  T(C): 36.6 (07-12-19 @ 12:25), Max: 36.9 (07-11-19 @ 23:30)  HR: 68 (07-12-19 @ 12:25) (55 - 94)  BP: 131/67 (07-12-19 @ 12:25) (128/77 - 164/70)  RR: 18 (07-12-19 @ 12:25) (18 - 18)  SpO2: 98% (07-12-19 @ 12:25) (94% - 99%)  Wt(kg): --  I&O's Summary      REVIEW OF SYSTEMS:  CONSTITUTIONAL: No fever, weight loss, or fatigue  EYES: No eye pain, visual disturbances, or discharge  ENMT:  No difficulty hearing, tinnitus, vertigo; No sinus or throat pain  NECK: No pain or stiffness  BREASTS: No pain, masses, or nipple discharge  RESPIRATORY: No cough, wheezing, chills or hemoptysis; No shortness of breath  CARDIOVASCULAR: No chest pain, palpitations, dizziness, or leg swelling  GASTROINTESTINAL: No abdominal or epigastric pain. No nausea, vomiting, or hematemesis; No diarrhea or constipation. No melena or hematochezia.  GENITOURINARY: No dysuria, frequency, hematuria, or incontinence  NEUROLOGICAL: No headaches, memory loss, loss of strength, numbness, or tremors  SKIN: No itching, burning, rashes, or lesions   LYMPH NODES: No enlarged glands  ENDOCRINE: No heat or cold intolerance; No hair loss  MUSCULOSKELETAL: No joint pain or swelling; No muscle, back, or extremity pain  PSYCHIATRIC: No depression, anxiety, mood swings, or difficulty sleeping  HEME/LYMPH: No easy bruising, or bleeding gums  ALLERY AND IMMUNOLOGIC: No hives or eczema    PHYSICAL EXAM:  GENERAL: NAD, well-groomed, well-developed  HEAD:  Atraumatic, Normocephalic  EYES: EOMI, PERRLA, conjunctiva and sclera clear  ENMT: No tonsillar erythema, exudates, or enlargement; Moist mucous membranes, Good dentition, No lesions  NECK: Supple, No JVD, Normal thyroid  NERVOUS SYSTEM:  Alert & Oriented X3, Good concentration; Motor Strength 5/5 B/L upper and lower extremities; DTRs 2+ intact and symmetric  CHEST/LUNG: Clear to percussion bilaterally; No rales, rhonchi, wheezing, or rubs  HEART: Regular rate and rhythm; No murmurs, rubs, or gallops  ABDOMEN: Soft, Nontender, Nondistended; Bowel sounds present  EXTREMITIES:  2+ Peripheral Pulses, No clubbing, cyanosis, or edema  LYMPH: No lymphadenopathy noted  SKIN: No rashes or lesions    LABS:                        12.1   19.26 )-----------( 203      ( 12 Jul 2019 06:59 )             42.9     07-12    140  |  105  |  18  ----------------------------<  235<H>  4.9   |  29  |  1.10    Ca    9.3      12 Jul 2019 06:59    TPro  6.3  /  Alb  3.4  /  TBili  1.7<H>  /  DBili  .30<H>  /  AST  14<L>  /  ALT  21  /  AlkPhos  175<H>  07-12    PT/INR - ( 12 Jul 2019 06:59 )   PT: 22.0 sec;   INR: 1.89 ratio             CAPILLARY BLOOD GLUCOSE      POCT Blood Glucose.: 321 mg/dL (12 Jul 2019 11:57)  POCT Blood Glucose.: 265 mg/dL (12 Jul 2019 08:03)  POCT Blood Glucose.: 215 mg/dL (11 Jul 2019 21:22)  POCT Blood Glucose.: 233 mg/dL (11 Jul 2019 16:46)      07-10 @ 09:40   No growth to date.  --  --  07-09 @ 23:11   <10,000 CFU/mL Normal Urogenital Gabi  --  --          Diet:    RADIOLOGY & ADDITIONAL TESTS:    Imaging Personally Reviewed:  [ ] YES  [ ] NO    Consultant(s) Notes Reviewed:  [ ] YES  [ ] NO    MEDICATIONS  (STANDING):  ALBUTerol    0.083% 2.5 milliGRAM(s) Nebulizer every 12 hours  aspirin enteric coated 81 milliGRAM(s) Oral daily  atorvastatin 40 milliGRAM(s) Oral at bedtime  cholecalciferol 1000 Unit(s) Oral daily  dextrose 5%. 1000 milliLiter(s) (50 mL/Hr) IV Continuous <Continuous>  dextrose 50% Injectable 12.5 Gram(s) IV Push once  dextrose 50% Injectable 25 Gram(s) IV Push once  dextrose 50% Injectable 25 Gram(s) IV Push once  hydroxyurea 500 milliGRAM(s) Oral daily  insulin lispro (HumaLOG) corrective regimen sliding scale   SubCutaneous three times a day before meals  insulin lispro (HumaLOG) corrective regimen sliding scale   SubCutaneous at bedtime  lisinopril 20 milliGRAM(s) Oral daily  metoprolol succinate ER 25 milliGRAM(s) Oral daily  pantoprazole    Tablet 40 milliGRAM(s) Oral before breakfast  piperacillin/tazobactam IVPB.. 3.375 Gram(s) IV Intermittent every 8 hours    MEDICATIONS  (PRN):  dextrose 40% Gel 15 Gram(s) Oral once PRN Blood Glucose LESS THAN 70 milliGRAM(s)/deciliter  glucagon  Injectable 1 milliGRAM(s) IntraMuscular once PRN Glucose LESS THAN 70 milligrams/deciliter      Disposition:    DVT Prophylaxis:  Subcu Heparin [  ]     LMWH [ ]     Coumadin [ ]    Xaeralto [ ]    Eliquis [ ]   Venodyne pumps [ ]    Discussed with Patient [ ]     Family [ ]          [ ]   RN[ ]      [ ]    Advance Directives:      Palliative Care:    Care Discussed with Consultants/Other Providers [ ] YES  [ ] NO    [  ] Teaching Attending Addendum: [  ] Present with Resident      I saw and evaluated the patient. Discussed with Resident,  _____________________ and agree with the resident's findings and plan as documented in the resident's note, with the following revision made as necessary.     Attending Comments:          Time spent: Patient is a 89y old  Female who presents with a chief complaint of sob/abdominal pain (12 Jul 2019 10:27)      INTERVAL HPI: Patient seen and examined at bedside. Denies N/V/D, chest pain, abdominal pain, headache or sore throat. Patient admits to a runny nose and some feeling of phlegm in her throat.     OVERNIGHT EVENTS: No acute events overnight. Patient slept and ate fine. Had an ordinary bowel movement.     T(C): 36.6 (07-12-19 @ 12:25), Max: 36.9 (07-11-19 @ 23:30)  HR: 68 (07-12-19 @ 12:25) (55 - 94)  BP: 131/67 (07-12-19 @ 12:25) (128/77 - 164/70)  RR: 18 (07-12-19 @ 12:25) (18 - 18)  SpO2: 98% (07-12-19 @ 12:25) (94% - 99%)  Wt(kg): --  I&O's Summary      REVIEW OF SYSTEMS:  CONSTITUTIONAL: No fever, weight loss, or fatigue  EYES: No eye pain, visual disturbances, or discharge  ENMT:  No difficulty hearing, tinnitus, vertigo; No sinus or throat pain. Admits to runny nose  NECK: No pain or stiffness. Admits to some phlegm in throat.  RESPIRATORY: No cough, wheezing, chills or hemoptysis; No shortness of breath  CARDIOVASCULAR: No chest pain, palpitations, dizziness, or leg swelling  GASTROINTESTINAL: No abdominal or epigastric pain. No nausea, vomiting, or hematemesis; No diarrhea or constipation. No melena or hematochezia.  GENITOURINARY: No dysuria, frequency, hematuria, or incontinence  NEUROLOGICAL: No headaches      PHYSICAL EXAM:  GENERAL: NAD, well-groomed, well-developed  HEAD:  Atraumatic, Normocephalic  NERVOUS SYSTEM:  Alert & Oriented X3  CHEST/LUNG: Clear to percussion bilaterally; No rales, rhonchi, wheezing, or rubs  HEART: Regular rate and rhythm; No murmurs, rubs, or gallops  ABDOMEN: Soft, Nontender, Nondistended; Bowel sounds present  EXTREMITIES:  2+ Peripheral Pulses, No clubbing, cyanosis, or edema    LABS:                        12.1   19.26 )-----------( 203      ( 12 Jul 2019 06:59 )             42.9     07-12    140  |  105  |  18  ----------------------------<  235<H>  4.9   |  29  |  1.10    Ca    9.3      12 Jul 2019 06:59    TPro  6.3  /  Alb  3.4  /  TBili  1.7<H>  /  DBili  .30<H>  /  AST  14<L>  /  ALT  21  /  AlkPhos  175<H>  07-12    PT/INR - ( 12 Jul 2019 06:59 )   PT: 22.0 sec;   INR: 1.89 ratio             CAPILLARY BLOOD GLUCOSE      POCT Blood Glucose.: 321 mg/dL (12 Jul 2019 11:57)  POCT Blood Glucose.: 265 mg/dL (12 Jul 2019 08:03)  POCT Blood Glucose.: 215 mg/dL (11 Jul 2019 21:22)  POCT Blood Glucose.: 233 mg/dL (11 Jul 2019 16:46)      07-10 @ 09:40   No growth to date.  --  --  07-09 @ 23:11   <10,000 CFU/mL Normal Urogenital Gabi  --  --      Diet:    RADIOLOGY & ADDITIONAL TESTS:  f/u with interpreted echo.     Imaging Personally Reviewed:  [ ] YES  [ ] NO    Consultant(s) Notes Reviewed:  [ ] YES  [ ] NO    MEDICATIONS  (STANDING):  ALBUTerol    0.083% 2.5 milliGRAM(s) Nebulizer every 12 hours  aspirin enteric coated 81 milliGRAM(s) Oral daily  atorvastatin 40 milliGRAM(s) Oral at bedtime  cholecalciferol 1000 Unit(s) Oral daily  dextrose 5%. 1000 milliLiter(s) (50 mL/Hr) IV Continuous <Continuous>  dextrose 50% Injectable 12.5 Gram(s) IV Push once  dextrose 50% Injectable 25 Gram(s) IV Push once  dextrose 50% Injectable 25 Gram(s) IV Push once  hydroxyurea 500 milliGRAM(s) Oral daily  insulin lispro (HumaLOG) corrective regimen sliding scale   SubCutaneous three times a day before meals  insulin lispro (HumaLOG) corrective regimen sliding scale   SubCutaneous at bedtime  lisinopril 20 milliGRAM(s) Oral daily  metoprolol succinate ER 25 milliGRAM(s) Oral daily  pantoprazole    Tablet 40 milliGRAM(s) Oral before breakfast  piperacillin/tazobactam IVPB.. 3.375 Gram(s) IV Intermittent every 8 hours    MEDICATIONS  (PRN):  dextrose 40% Gel 15 Gram(s) Oral once PRN Blood Glucose LESS THAN 70 milliGRAM(s)/deciliter  glucagon  Injectable 1 milliGRAM(s) IntraMuscular once PRN Glucose LESS THAN 70 milligrams/deciliter      Disposition:    DVT Prophylaxis:  Subcu Heparin [  ]     LMWH [ ]     Coumadin [ ]    Xaeralto [ ]    Eliquis [ ]   Venodyne pumps [ ]    Discussed with Patient [ ]     Family [ ]          [ ]   RN[ ]      [ ]    Advance Directives:      Palliative Care:    Care Discussed with Consultants/Other Providers [ ] YES  [ ] NO    [  ] Teaching Attending Addendum: [  ] Present with Resident      I saw and evaluated the patient. Discussed with Resident,  _____________________ and agree with the resident's findings and plan as documented in the resident's note, with the following revision made as necessary.     Attending Comments:          Time spent: Patient is a 89y old  Female who presents with a chief complaint of sob/abdominal pain (12 Jul 2019 10:27)  INTERVAL HPI: Patient seen and examined at bedside. Denies N/V/D, chest pain, abdominal pain, headache or sore throat. Patient admits to a runny nose and some feeling of phlegm in her throat.     OVERNIGHT EVENTS: No acute events overnight. Patient slept and ate fine. Had an ordinary bowel movement.     T(C): 36.6 (07-12-19 @ 12:25), Max: 36.9 (07-11-19 @ 23:30)  HR: 68 (07-12-19 @ 12:25) (55 - 94)  BP: 131/67 (07-12-19 @ 12:25) (128/77 - 164/70)  RR: 18 (07-12-19 @ 12:25) (18 - 18)  SpO2: 98% (07-12-19 @ 12:25) (94% - 99%)  Wt(kg): --  I&O's Summary      REVIEW OF SYSTEMS:  CONSTITUTIONAL: No fever, weight loss, or fatigue  EYES: No eye pain, visual disturbances, or discharge  ENMT:  No difficulty hearing, tinnitus, vertigo; No sinus or throat pain. Admits to runny nose  NECK: No pain or stiffness. Admits to some phlegm in throat.  RESPIRATORY: No cough, wheezing, chills or hemoptysis; No shortness of breath  CARDIOVASCULAR: No chest pain, palpitations, dizziness, or leg swelling  GASTROINTESTINAL: No abdominal or epigastric pain. No nausea, vomiting, or hematemesis; No diarrhea or constipation. No melena or hematochezia.  GENITOURINARY: No dysuria, frequency, hematuria, or incontinence  NEUROLOGICAL: No headaches      PHYSICAL EXAM:  GENERAL: NAD, well-groomed, well-developed  HEAD:  Atraumatic, Normocephalic  NERVOUS SYSTEM:  Alert & Oriented X3  CHEST/LUNG: Clear to percussion bilaterally; No rales, rhonchi, wheezing, or rubs  HEART: Regular rate and rhythm; No murmurs, rubs, or gallops  ABDOMEN: Soft, Nontender, Nondistended; Bowel sounds present  EXTREMITIES:  2+ Peripheral Pulses, No clubbing, cyanosis, or edema    LABS:                        12.1   19.26 )-----------( 203      ( 12 Jul 2019 06:59 )             42.9     07-12    140  |  105  |  18  ----------------------------<  235<H>  4.9   |  29  |  1.10    Ca    9.3      12 Jul 2019 06:59    TPro  6.3  /  Alb  3.4  /  TBili  1.7<H>  /  DBili  .30<H>  /  AST  14<L>  /  ALT  21  /  AlkPhos  175<H>  07-12    PT/INR - ( 12 Jul 2019 06:59 )   PT: 22.0 sec;   INR: 1.89 ratio             CAPILLARY BLOOD GLUCOSE      POCT Blood Glucose.: 321 mg/dL (12 Jul 2019 11:57)  POCT Blood Glucose.: 265 mg/dL (12 Jul 2019 08:03)  POCT Blood Glucose.: 215 mg/dL (11 Jul 2019 21:22)  POCT Blood Glucose.: 233 mg/dL (11 Jul 2019 16:46)      07-10 @ 09:40   No growth to date.  --  --  07-09 @ 23:11   <10,000 CFU/mL Normal Urogenital Gabi  --  --      Diet:    RADIOLOGY & ADDITIONAL TESTS:  f/u with interpreted echo.     Imaging Personally Reviewed:  [ ] YES  [ ] NO    Consultant(s) Notes Reviewed:  [ ] YES  [ ] NO    MEDICATIONS  (STANDING):  ALBUTerol    0.083% 2.5 milliGRAM(s) Nebulizer every 12 hours  aspirin enteric coated 81 milliGRAM(s) Oral daily  atorvastatin 40 milliGRAM(s) Oral at bedtime  cholecalciferol 1000 Unit(s) Oral daily  dextrose 5%. 1000 milliLiter(s) (50 mL/Hr) IV Continuous <Continuous>  dextrose 50% Injectable 12.5 Gram(s) IV Push once  dextrose 50% Injectable 25 Gram(s) IV Push once  dextrose 50% Injectable 25 Gram(s) IV Push once  hydroxyurea 500 milliGRAM(s) Oral daily  insulin lispro (HumaLOG) corrective regimen sliding scale   SubCutaneous three times a day before meals  insulin lispro (HumaLOG) corrective regimen sliding scale   SubCutaneous at bedtime  lisinopril 20 milliGRAM(s) Oral daily  metoprolol succinate ER 25 milliGRAM(s) Oral daily  pantoprazole    Tablet 40 milliGRAM(s) Oral before breakfast  piperacillin/tazobactam IVPB.. 3.375 Gram(s) IV Intermittent every 8 hours    MEDICATIONS  (PRN):  dextrose 40% Gel 15 Gram(s) Oral once PRN Blood Glucose LESS THAN 70 milliGRAM(s)/deciliter  glucagon  Injectable 1 milliGRAM(s) IntraMuscular once PRN Glucose LESS THAN 70 milligrams/deciliter      Disposition:    DVT Prophylaxis:  Subcu Heparin [  ]     LMWH [ ]     Coumadin [ ]    Xaeralto [ ]    Eliquis [ ]   Venodyne pumps [ ]    Discussed with Patient [ ]     Family [ ]          [ ]   RN[ ]      [ ]    Advance Directives:      Palliative Care:    Care Discussed with Consultants/Other Providers [ ] YES  [ ] NO    [  ] Teaching Attending Addendum: [  ] Present with Resident      I saw and evaluated the patient. Discussed with Resident,  _____________________ and agree with the resident's findings and plan as documented in the resident's note, with the following revision made as necessary.     Attending Comments:          Time spent:

## 2019-07-12 NOTE — PROGRESS NOTE ADULT - PROBLEM SELECTOR PLAN 2
-may be reactive, pct low elevated  -Leukocytosis trending higher. As of 7/11, WBC trending higher to 20, within neutrophillic predominance increasing.  *Continue Zosyn, will likely descalate, apprec ID recw - Per ID, no evidence of infection found to explain leukocytosis. Noninfectious, most likely 2/2 underlying myeloproliferative process. Possible P. vera or other Willy-2 related eitology?   - Prior WBC levels in April 2019 were 23.2k, further supporting myeloproliferative process  - Discontinued Zosyn

## 2019-07-12 NOTE — PROGRESS NOTE ADULT - PROBLEM SELECTOR PLAN 4
-chronic, rate controlled  -Coumadin held for potential cath on 7/12.  *Normal schedule M/W/F = 2mg, Tu/Th/Sa/Sun = 3mg  *INR today is 2.36, must come down for potential cath.  *Continue metoprolol 25mg  *Stable, controlled -chronic, rate controlled  -Coumadin held for potential cath on weekend  *Normal schedule M/W/F = 2mg, Tu/Th/Sa/Sun = 3mg  *INR today is 1.89, must come down for potential cath.  *Continue metoprolol 25mg  *Stable, controlled

## 2019-07-12 NOTE — PROGRESS NOTE ADULT - PROBLEM SELECTOR PLAN 6
-resolving  -CT abdomen diverticulosis otherwise negative for pathology  *Per GI, colonoscopy not indicated at this time  *monitor abdominal exam  *Diet as tolerated

## 2019-07-12 NOTE — PROGRESS NOTE ADULT - PROBLEM SELECTOR PLAN 3
-worsening, acute on chronic  -apprec cardio recs will likely need cath once inr normalizes  -Cardiac enzymes negative x 3  *Continue ASA, metoprolol, lisinopril, atorvastatin   *EKG negative for acute ischemia  *F/u with pending echo  consider CTA if symptoms worsen -Chronic stable  - Continue ASA, metoprolol, lisinopril, atorvastatin  - EKG negative for ischemic process  - Echo showed EF 60%, Severe pulmonary hypertension  - Continue ASA, metoprolol, lisinopril, atorvastatin   - consider CTA if symptoms worsen

## 2019-07-13 LAB
ANION GAP SERPL CALC-SCNC: 8 MMOL/L — SIGNIFICANT CHANGE UP (ref 5–17)
APTT BLD: 40.7 SEC — HIGH (ref 27.5–36.3)
BUN SERPL-MCNC: 24 MG/DL — HIGH (ref 7–23)
CALCIUM SERPL-MCNC: 9.2 MG/DL — SIGNIFICANT CHANGE UP (ref 8.5–10.1)
CHLORIDE SERPL-SCNC: 103 MMOL/L — SIGNIFICANT CHANGE UP (ref 96–108)
CO2 SERPL-SCNC: 28 MMOL/L — SIGNIFICANT CHANGE UP (ref 22–31)
CREAT SERPL-MCNC: 1.1 MG/DL — SIGNIFICANT CHANGE UP (ref 0.5–1.3)
GLUCOSE SERPL-MCNC: 237 MG/DL — HIGH (ref 70–99)
HCT VFR BLD CALC: 44.5 % — SIGNIFICANT CHANGE UP (ref 34.5–45)
HGB BLD-MCNC: 12.6 G/DL — SIGNIFICANT CHANGE UP (ref 11.5–15.5)
INR BLD: 1.55 RATIO — HIGH (ref 0.88–1.16)
MCHC RBC-ENTMCNC: 24.2 PG — LOW (ref 27–34)
MCHC RBC-ENTMCNC: 28.3 GM/DL — LOW (ref 32–36)
MCV RBC AUTO: 85.6 FL — SIGNIFICANT CHANGE UP (ref 80–100)
NRBC # BLD: 0 /100 WBCS — SIGNIFICANT CHANGE UP (ref 0–0)
PLATELET # BLD AUTO: 250 K/UL — SIGNIFICANT CHANGE UP (ref 150–400)
POTASSIUM SERPL-MCNC: 4 MMOL/L — SIGNIFICANT CHANGE UP (ref 3.5–5.3)
POTASSIUM SERPL-SCNC: 4 MMOL/L — SIGNIFICANT CHANGE UP (ref 3.5–5.3)
PROTHROM AB SERPL-ACNC: 17.7 SEC — HIGH (ref 10–12.9)
RBC # BLD: 5.2 M/UL — SIGNIFICANT CHANGE UP (ref 3.8–5.2)
RBC # FLD: 18.1 % — HIGH (ref 10.3–14.5)
SODIUM SERPL-SCNC: 139 MMOL/L — SIGNIFICANT CHANGE UP (ref 135–145)
WBC # BLD: 23.03 K/UL — HIGH (ref 3.8–10.5)
WBC # FLD AUTO: 23.03 K/UL — HIGH (ref 3.8–10.5)

## 2019-07-13 PROCEDURE — 99233 SBSQ HOSP IP/OBS HIGH 50: CPT

## 2019-07-13 RX ORDER — FUROSEMIDE 40 MG
40 TABLET ORAL ONCE
Refills: 0 | Status: COMPLETED | OUTPATIENT
Start: 2019-07-13 | End: 2019-07-13

## 2019-07-13 RX ORDER — FUROSEMIDE 40 MG
40 TABLET ORAL DAILY
Refills: 0 | Status: DISCONTINUED | OUTPATIENT
Start: 2019-07-13 | End: 2019-07-14

## 2019-07-13 RX ADMIN — HYDROXYUREA 500 MILLIGRAM(S): 500 CAPSULE ORAL at 12:34

## 2019-07-13 RX ADMIN — Medication 1000 UNIT(S): at 12:34

## 2019-07-13 RX ADMIN — PANTOPRAZOLE SODIUM 40 MILLIGRAM(S): 20 TABLET, DELAYED RELEASE ORAL at 06:07

## 2019-07-13 RX ADMIN — Medication 1: at 08:36

## 2019-07-13 RX ADMIN — Medication 1: at 22:16

## 2019-07-13 RX ADMIN — Medication 4: at 12:34

## 2019-07-13 RX ADMIN — Medication 2: at 17:42

## 2019-07-13 RX ADMIN — Medication 40 MILLIGRAM(S): at 17:45

## 2019-07-13 RX ADMIN — LISINOPRIL 20 MILLIGRAM(S): 2.5 TABLET ORAL at 06:07

## 2019-07-13 RX ADMIN — ATORVASTATIN CALCIUM 40 MILLIGRAM(S): 80 TABLET, FILM COATED ORAL at 22:16

## 2019-07-13 RX ADMIN — Medication 25 MILLIGRAM(S): at 06:07

## 2019-07-13 RX ADMIN — ALBUTEROL 2.5 MILLIGRAM(S): 90 AEROSOL, METERED ORAL at 07:12

## 2019-07-13 RX ADMIN — Medication 81 MILLIGRAM(S): at 12:34

## 2019-07-13 RX ADMIN — Medication 40 MILLIGRAM(S): at 12:35

## 2019-07-13 NOTE — PROGRESS NOTE ADULT - PROBLEM SELECTOR PLAN 4
-chronic, rate controlled  -Coumadin held for potential cath on weekend  *Normal schedule M/W/F = 2mg, Tu/Th/Sa/Sun = 3mg  *INR today is 1.89, must come down for potential cath.  *Continue metoprolol 25mg  *Stable, controlled -chronic, rate controlled  -Coumadin held for potential cath on monday  -inr decreased and ready for cath  -apprec cardio collaboration

## 2019-07-13 NOTE — PROGRESS NOTE ADULT - SUBJECTIVE AND OBJECTIVE BOX
Date/Time Patient Seen:  		  Referring MD:   Data Reviewed	       Patient is a 89y old  Female who presents with a chief complaint of sob/abdominal pain (12 Jul 2019 12:31)      Subjective/HPI     PAST MEDICAL & SURGICAL HISTORY:  Myeloproliferative disease  Anal fissure  Elevated red blood cell count  Atrial fibrillation: on coumadin  Skin cancer: of back - removed  Type 2 diabetes mellitus: not on insulin  Hypercholesterolemia  HTN (hypertension)  Myocardial infarction: 8 yrs ago  Hearing loss  S/P ORIF (open reduction internal fixation) fracture: right hip pinning  - 5 yrs ago  History of cataract surgery: 03/2016,  06/2016        Medication list         MEDICATIONS  (STANDING):  ALBUTerol    0.083% 2.5 milliGRAM(s) Nebulizer every 12 hours  aspirin enteric coated 81 milliGRAM(s) Oral daily  atorvastatin 40 milliGRAM(s) Oral at bedtime  cholecalciferol 1000 Unit(s) Oral daily  dextrose 5%. 1000 milliLiter(s) (50 mL/Hr) IV Continuous <Continuous>  dextrose 50% Injectable 12.5 Gram(s) IV Push once  dextrose 50% Injectable 25 Gram(s) IV Push once  dextrose 50% Injectable 25 Gram(s) IV Push once  hydroxyurea 500 milliGRAM(s) Oral daily  insulin lispro (HumaLOG) corrective regimen sliding scale   SubCutaneous three times a day before meals  insulin lispro (HumaLOG) corrective regimen sliding scale   SubCutaneous at bedtime  lisinopril 20 milliGRAM(s) Oral daily  metoprolol succinate ER 25 milliGRAM(s) Oral daily  pantoprazole    Tablet 40 milliGRAM(s) Oral before breakfast    MEDICATIONS  (PRN):  dextrose 40% Gel 15 Gram(s) Oral once PRN Blood Glucose LESS THAN 70 milliGRAM(s)/deciliter  glucagon  Injectable 1 milliGRAM(s) IntraMuscular once PRN Glucose LESS THAN 70 milligrams/deciliter         Vitals log        ICU Vital Signs Last 24 Hrs  T(C): 36.6 (13 Jul 2019 05:00), Max: 36.8 (12 Jul 2019 20:41)  T(F): 97.9 (13 Jul 2019 05:00), Max: 98.3 (12 Jul 2019 20:41)  HR: 76 (13 Jul 2019 06:06) (57 - 76)  BP: 122/72 (13 Jul 2019 06:06) (106/70 - 164/70)  BP(mean): --  ABP: --  ABP(mean): --  RR: 18 (13 Jul 2019 05:00) (18 - 18)  SpO2: 92% (13 Jul 2019 05:00) (92% - 99%)           Input and Output:  I&O's Detail      Lab Data                        12.6   23.03 )-----------( 250      ( 13 Jul 2019 06:17 )             44.5     07-13    139  |  103  |  24<H>  ----------------------------<  237<H>  4.0   |  28  |  1.10    Ca    9.2      13 Jul 2019 06:17    TPro  6.3  /  Alb  3.4  /  TBili  1.7<H>  /  DBili  .30<H>  /  AST  14<L>  /  ALT  21  /  AlkPhos  175<H>  07-12            Review of Systems	      Objective     Physical Examination    heart s1s2  lung dec BS  abd soft      Pertinent Lab findings & Imaging      Shira:  NO   Adequate UO     I&O's Detail           Discussed with:     Cultures:	        Radiology

## 2019-07-13 NOTE — PROGRESS NOTE ADULT - SUBJECTIVE AND OBJECTIVE BOX
INTERVAL HPI/OVERNIGHT EVENTS:  pt seen and examined  denies n/v/abd pain       MEDICATIONS  (STANDING):  ALBUTerol    0.083% 2.5 milliGRAM(s) Nebulizer every 12 hours  aspirin enteric coated 81 milliGRAM(s) Oral daily  atorvastatin 40 milliGRAM(s) Oral at bedtime  cholecalciferol 1000 Unit(s) Oral daily  dextrose 5%. 1000 milliLiter(s) (50 mL/Hr) IV Continuous <Continuous>  dextrose 50% Injectable 12.5 Gram(s) IV Push once  dextrose 50% Injectable 25 Gram(s) IV Push once  dextrose 50% Injectable 25 Gram(s) IV Push once  hydroxyurea 500 milliGRAM(s) Oral daily  insulin lispro (HumaLOG) corrective regimen sliding scale   SubCutaneous three times a day before meals  insulin lispro (HumaLOG) corrective regimen sliding scale   SubCutaneous at bedtime  lisinopril 20 milliGRAM(s) Oral daily  metoprolol succinate ER 25 milliGRAM(s) Oral daily  pantoprazole    Tablet 40 milliGRAM(s) Oral before breakfast  piperacillin/tazobactam IVPB.. 3.375 Gram(s) IV Intermittent every 8 hours    MEDICATIONS  (PRN):  dextrose 40% Gel 15 Gram(s) Oral once PRN Blood Glucose LESS THAN 70 milliGRAM(s)/deciliter  glucagon  Injectable 1 milliGRAM(s) IntraMuscular once PRN Glucose LESS THAN 70 milligrams/deciliter      Allergies    No Known Allergies    Intolerances        Review of Systems:    General:  No wt loss, fevers, chills, night sweats, fatigue   Eyes:  Good vision, no reported pain  ENT:  No sore throat, pain, runny nose, dysphagia  CV:  No pain, palpitations, hypo/hypertension  Resp:  No dyspnea, cough, tachypnea, wheezing  GI:  No pain, No nausea, No vomiting, No diarrhea, No constipation, No weight loss, No fever, No pruritis, No rectal bleeding, No melena, No dysphagia  :  No pain, bleeding, incontinence, nocturia  Muscle:  No pain, weakness  Neuro:  No weakness, tingling, memory problems  Psych:  No fatigue, insomnia, mood problems, depression  Endocrine:  No polyuria, polydypsia, cold/heat intolerance  Heme:  No petechiae, ecchymosis, easy bruisability  Skin:  No rash, tattoos, scars, edema      Vital Signs Last 24 Hrs  T(C): 36.7 (12 Jul 2019 07:42), Max: 36.9 (11 Jul 2019 23:30)  T(F): 98.1 (12 Jul 2019 07:42), Max: 98.4 (11 Jul 2019 23:30)  HR: 57 (12 Jul 2019 07:42) (55 - 94)  BP: 142/81 (12 Jul 2019 07:42) (128/77 - 162/76)  BP(mean): --  RR: 18 (12 Jul 2019 07:42) (18 - 18)  SpO2: 99% (12 Jul 2019 07:42) (94% - 99%)    PHYSICAL EXAM:    Constitutional: NAD  HEENT: ncat  Neck: No LAD  Respiratory: dec bs  Cardiovascular: S1 and S2, RRR  Gastrointestinal: soft nt mild dt  Extremities: No peripheral edema  Vascular: 2+ peripheral pulses  Neurological: awake alert  Skin: No rashes      LABS:                        12.1   19.26 )-----------( 203      ( 12 Jul 2019 06:59 )             42.9     07-12    140  |  105  |  18  ----------------------------<  235<H>  4.9   |  29  |  1.10    Ca    9.3      12 Jul 2019 06:59    TPro  6.3  /  Alb  3.4  /  TBili  1.7<H>  /  DBili  .30<H>  /  AST  14<L>  /  ALT  21  /  AlkPhos  175<H>  07-12    PT/INR - ( 12 Jul 2019 06:59 )   PT: 22.0 sec;   INR: 1.89 ratio               RADIOLOGY & ADDITIONAL TESTS:

## 2019-07-13 NOTE — PROGRESS NOTE ADULT - PROBLEM SELECTOR PLAN 3
-Chronic stable  - Continue ASA, metoprolol, lisinopril, atorvastatin  - EKG negative for ischemic process  - Echo showed EF 60%, Severe pulmonary hypertension  - Continue ASA, metoprolol, lisinopril, atorvastatin   - consider CTA if symptoms worsen - acute on chronic  - Continue ASA, metoprolol, lisinopril, atorvastatin  - EKG negative for ischemic process  - Echo showed EF 60%, Severe pulmonary hypertension  - Continue ASA, metoprolol, lisinopril, atorvastatin   - cath for monday

## 2019-07-13 NOTE — PROGRESS NOTE ADULT - ASSESSMENT
89F w/pmh of Afib on coumadin, Myeloproliferative disorder on hydroxyurea, HTN, HLD, CAD s/p prior MI (8 years ago) with a known chronically occluded RCA, moderate disease in LCX and LAD managed medically, normal LV and Type 2 DM not on insulin presenting with episode of worsening dyspnea on exertion sec to worsening CAD requriing cath and resolving abdominal pain  diverticulosis.

## 2019-07-13 NOTE — PROGRESS NOTE ADULT - SUBJECTIVE AND OBJECTIVE BOX
Patient is a 89y old  Female who presents with a chief complaint of sob/abdominal pain (13 Jul 2019 13:17)      INTERVAL HPI: Pt seen and examined. denies any acute complaints    OVERNIGHT EVENTS:  T(F): 98.6 (07-13-19 @ 16:14), Max: 98.6 (07-13-19 @ 16:14)  HR: 69 (07-13-19 @ 17:47) (64 - 80)  BP: 117/74 (07-13-19 @ 17:47) (106/62 - 133/76)  RR: 20 (07-13-19 @ 16:14) (18 - 20)  SpO2: 96% (07-13-19 @ 16:14) (92% - 98%)  I&O's Summary      REVIEW OF SYSTEMS:  CONSTITUTIONAL: No fever, weight loss, or fatigue  EYES: No eye pain, visual disturbances, or discharge  ENMT:  No difficulty hearing, tinnitus, vertigo; No sinus or throat pain  NECK: No pain or stiffness  BREASTS: No pain, masses, or nipple discharge  RESPIRATORY: No cough, wheezing, chills or hemoptysis; No shortness of breath  CARDIOVASCULAR: No chest pain, palpitations, dizziness, or leg swelling  GASTROINTESTINAL: No abdominal or epigastric pain. No nausea, vomiting, or hematemesis; No diarrhea or constipation. No melena or hematochezia.  GENITOURINARY: No dysuria, frequency, hematuria, or incontinence  NEUROLOGICAL: No headaches, memory loss, loss of strength, numbness, or tremors  SKIN: No itching, burning, rashes, or lesions   LYMPH NODES: No enlarged glands  ENDOCRINE: No heat or cold intolerance; No hair loss  MUSCULOSKELETAL: No joint pain or swelling; No muscle, back, or extremity pain  PSYCHIATRIC: No depression, anxiety, mood swings, or difficulty sleeping  HEME/LYMPH: No easy bruising, or bleeding gums  ALLERY AND IMMUNOLOGIC: No hives or eczema    PHYSICAL EXAM:  GENERAL: NAD, well-groomed, well-developed  HEAD:  Atraumatic, Normocephalic  EYES: EOMI, PERRLA, conjunctiva and sclera clear  ENMT: No tonsillar erythema, exudates, or enlargement; Moist mucous membranes, Good dentition, No lesions  NECK: Supple, No JVD, Normal thyroid  NERVOUS SYSTEM:  Alert & Oriented X3, Good concentration; Motor Strength 5/5 B/L upper and lower extremities; DTRs 2+ intact and symmetric  CHEST/LUNG: Clear to percussion bilaterally; No rales, rhonchi, wheezing, or rubs  HEART: Regular rate and rhythm; No murmurs, rubs, or gallops  ABDOMEN: Soft, Nontender, Nondistended; Bowel sounds present  EXTREMITIES:  2+ Peripheral Pulses, No clubbing, cyanosis, or edema  LYMPH: No lymphadenopathy noted  SKIN: No rashes or lesions    LABS:                        12.6   23.03 )-----------( 250      ( 13 Jul 2019 06:17 )             44.5     07-13    139  |  103  |  24<H>  ----------------------------<  237<H>  4.0   |  28  |  1.10    Ca    9.2      13 Jul 2019 06:17    TPro  6.3  /  Alb  3.4  /  TBili  1.7<H>  /  DBili  .30<H>  /  AST  14<L>  /  ALT  21  /  AlkPhos  175<H>  07-12    PT/INR - ( 13 Jul 2019 06:17 )   PT: 17.7 sec;   INR: 1.55 ratio         PTT - ( 13 Jul 2019 06:17 )  PTT:40.7 sec    CAPILLARY BLOOD GLUCOSE      POCT Blood Glucose.: 244 mg/dL (13 Jul 2019 16:56)  POCT Blood Glucose.: 331 mg/dL (13 Jul 2019 11:55)  POCT Blood Glucose.: 197 mg/dL (13 Jul 2019 08:19)  POCT Blood Glucose.: 261 mg/dL (12 Jul 2019 22:33)      07-10 @ 09:40   No growth to date.  --  --  07-09 @ 23:11   <10,000 CFU/mL Normal Urogenital Gabi  --  --          MEDICATIONS  (STANDING):  ALBUTerol    0.083% 2.5 milliGRAM(s) Nebulizer every 12 hours  aspirin enteric coated 81 milliGRAM(s) Oral daily  atorvastatin 40 milliGRAM(s) Oral at bedtime  cholecalciferol 1000 Unit(s) Oral daily  dextrose 5%. 1000 milliLiter(s) (50 mL/Hr) IV Continuous <Continuous>  dextrose 50% Injectable 12.5 Gram(s) IV Push once  dextrose 50% Injectable 25 Gram(s) IV Push once  dextrose 50% Injectable 25 Gram(s) IV Push once  furosemide   Injectable 40 milliGRAM(s) IV Push daily  hydroxyurea 500 milliGRAM(s) Oral daily  insulin lispro (HumaLOG) corrective regimen sliding scale   SubCutaneous three times a day before meals  insulin lispro (HumaLOG) corrective regimen sliding scale   SubCutaneous at bedtime  lisinopril 20 milliGRAM(s) Oral daily  metoprolol succinate ER 25 milliGRAM(s) Oral daily  pantoprazole    Tablet 40 milliGRAM(s) Oral before breakfast    MEDICATIONS  (PRN):  dextrose 40% Gel 15 Gram(s) Oral once PRN Blood Glucose LESS THAN 70 milliGRAM(s)/deciliter  glucagon  Injectable 1 milliGRAM(s) IntraMuscular once PRN Glucose LESS THAN 70 milligrams/deciliter Patient is a 89y old  Female who presents with a chief complaint of sob/abdominal pain (13 Jul 2019 13:17)      INTERVAL HPI: Pt seen and examined. denies any acute complaints, still has some leg edema, sob improving. Limited as has some mild cognitive impairment.     OVERNIGHT EVENTS: none noted  T(F): 98.6 (07-13-19 @ 16:14), Max: 98.6 (07-13-19 @ 16:14)  HR: 69 (07-13-19 @ 17:47) (64 - 80)  BP: 117/74 (07-13-19 @ 17:47) (106/62 - 133/76)  RR: 20 (07-13-19 @ 16:14) (18 - 20)  SpO2: 96% (07-13-19 @ 16:14) (92% - 98%)  I&O's Summary      REVIEW OF SYSTEMS:  CONSTITUTIONAL: No fever, weight loss, or fatigue  RESPIRATORY: No cough, wheezing, chills or hemoptysis; No shortness of breath  CARDIOVASCULAR: No chest pain, palpitations, dizziness, or leg swelling  GASTROINTESTINAL: No abdominal or epigastric pain. No nausea, vomiting, or hematemesis; No diarrhea or constipation. No melena or hematochezia.  GENITOURINARY: No dysuria, frequency, hematuria, or incontinence  NEUROLOGICAL: No headaches, memory loss, loss of strength, numbness, or tremors  SKIN: No itching, burning, rashes, or lesions   MUSCULOSKELETAL: No joint pain or swelling; No muscle, back, or extremity pain  PSYCHIATRIC: No depression, anxiety, mood swings, or difficulty sleeping      PHYSICAL EXAM:  GENERAL: NAD, elder, frail  NERVOUS SYSTEM:  Alert & Oriented X3,Motor Strength 3/5 B/L upper and lower extremities;  CHEST/LUNG: Clear to percussion bilaterally; No rales, rhonchi, wheezing, or rubs  HEART: Regular rate and rhythm; No murmurs, rubs, or gallops  ABDOMEN: Soft, Nontender, Nondistended; Bowel sounds present  EXTREMITIES:  2+ Peripheral Pulses, No clubbing, cyanosis,   SKIN: No rashes or lesions    LABS:                        12.6   23.03 )-----------( 250      ( 13 Jul 2019 06:17 )             44.5     07-13    139  |  103  |  24<H>  ----------------------------<  237<H>  4.0   |  28  |  1.10    Ca    9.2      13 Jul 2019 06:17    TPro  6.3  /  Alb  3.4  /  TBili  1.7<H>  /  DBili  .30<H>  /  AST  14<L>  /  ALT  21  /  AlkPhos  175<H>  07-12    PT/INR - ( 13 Jul 2019 06:17 )   PT: 17.7 sec;   INR: 1.55 ratio         PTT - ( 13 Jul 2019 06:17 )  PTT:40.7 sec    CAPILLARY BLOOD GLUCOSE      POCT Blood Glucose.: 244 mg/dL (13 Jul 2019 16:56)  POCT Blood Glucose.: 331 mg/dL (13 Jul 2019 11:55)  POCT Blood Glucose.: 197 mg/dL (13 Jul 2019 08:19)  POCT Blood Glucose.: 261 mg/dL (12 Jul 2019 22:33)      07-10 @ 09:40   No growth to date.  --  --  07-09 @ 23:11   <10,000 CFU/mL Normal Urogenital Gabi  --  --          MEDICATIONS  (STANDING):  ALBUTerol    0.083% 2.5 milliGRAM(s) Nebulizer every 12 hours  aspirin enteric coated 81 milliGRAM(s) Oral daily  atorvastatin 40 milliGRAM(s) Oral at bedtime  cholecalciferol 1000 Unit(s) Oral daily  dextrose 5%. 1000 milliLiter(s) (50 mL/Hr) IV Continuous <Continuous>  dextrose 50% Injectable 12.5 Gram(s) IV Push once  dextrose 50% Injectable 25 Gram(s) IV Push once  dextrose 50% Injectable 25 Gram(s) IV Push once  furosemide   Injectable 40 milliGRAM(s) IV Push daily  hydroxyurea 500 milliGRAM(s) Oral daily  insulin lispro (HumaLOG) corrective regimen sliding scale   SubCutaneous three times a day before meals  insulin lispro (HumaLOG) corrective regimen sliding scale   SubCutaneous at bedtime  lisinopril 20 milliGRAM(s) Oral daily  metoprolol succinate ER 25 milliGRAM(s) Oral daily  pantoprazole    Tablet 40 milliGRAM(s) Oral before breakfast    MEDICATIONS  (PRN):  dextrose 40% Gel 15 Gram(s) Oral once PRN Blood Glucose LESS THAN 70 milliGRAM(s)/deciliter  glucagon  Injectable 1 milliGRAM(s) IntraMuscular once PRN Glucose LESS THAN 70 milligrams/deciliter

## 2019-07-13 NOTE — PROGRESS NOTE ADULT - PROBLEM SELECTOR PLAN 2
- Per ID, no evidence of infection found to explain leukocytosis. Noninfectious, most likely 2/2 underlying myeloproliferative process. Possible P. vera or other Willy-2 related eitology?   - Prior WBC levels in April 2019 were 23.2k, further supporting myeloproliferative process  - Discontinued Zosyn - idiopathic, may be reactive  -  Per ID, no evidence of infection found to explain leukocytosis. Noninfectious, most likely 2/2 underlying myeloproliferative process. Possible P. vera or other Willy-2 related eitology?

## 2019-07-13 NOTE — PROGRESS NOTE ADULT - PROBLEM SELECTOR PLAN 1
- acute, unresolved  -  Unremarkable CXR  - Echo: EF 60%, Severe pulmonary hypertension  - Cardiac enzymes negative x 3  - CT chest negative for pleural effusion or volume overload  - possible s/p angina. H/o positive nuclear stress test per cardio. - acute poss sec to worsening cad requiring cath monday 7/15/2019  -  Unremarkable CXR  - Echo: EF 60%, Severe pulmonary hypertension  - Cardiac enzymes negative x 3  - CT chest negative for pleural effusion or volume overload  - possible s/p angina. H/o positive nuclear stress test per cardio.

## 2019-07-13 NOTE — PROGRESS NOTE ADULT - PROBLEM SELECTOR PLAN 1
ID eval noted, monitored off ABX  am labs pending  planned for cardiac cath in near future -   cardio follow up  leukocytosis due to MPD - known -   abnormal CT scan - nodule, GGO, LN, - will need rpt CT chest in 8 - 12 weeks   Swallow eval - report noted - thank you  monitor vs and HD and Sat  keep sat > 88 pct  out of bed as tolerated  GOC documented - pt is full code  will follow.

## 2019-07-13 NOTE — PROGRESS NOTE ADULT - SUBJECTIVE AND OBJECTIVE BOX
[facility] -- Elida Douglas, Ayaz Macias Pannella, Patel, Savella  Office # [phone]      Follow Up:    SOB, Dyspnea   Subjective/Observations:   No events overnight resting comfortably in bed.  No complaints of chest pain, dyspnea, or palpitations reported. No signs of orthopnea or PND.     REVIEW OF SYSTEMS: All other review of systems is negative unless indicated above    PAST MEDICAL & SURGICAL HISTORY:  Myeloproliferative disease  Anal fissure  Elevated red blood cell count  Atrial fibrillation: on coumadin  Skin cancer: of back - removed  Type 2 diabetes mellitus: not on insulin  Hypercholesterolemia  HTN (hypertension)  Myocardial infarction: 8 yrs ago  Hearing loss  S/P ORIF (open reduction internal fixation) fracture: right hip pinning  - 5 yrs ago  History of cataract surgery: 03/2016,  06/2016      MEDICATIONS  (STANDING):  ALBUTerol    0.083% 2.5 milliGRAM(s) Nebulizer every 12 hours  aspirin enteric coated 81 milliGRAM(s) Oral daily  atorvastatin 40 milliGRAM(s) Oral at bedtime  cholecalciferol 1000 Unit(s) Oral daily  dextrose 5%. 1000 milliLiter(s) (50 mL/Hr) IV Continuous <Continuous>  dextrose 50% Injectable 12.5 Gram(s) IV Push once  dextrose 50% Injectable 25 Gram(s) IV Push once  dextrose 50% Injectable 25 Gram(s) IV Push once  hydroxyurea 500 milliGRAM(s) Oral daily  insulin lispro (HumaLOG) corrective regimen sliding scale   SubCutaneous three times a day before meals  insulin lispro (HumaLOG) corrective regimen sliding scale   SubCutaneous at bedtime  lisinopril 20 milliGRAM(s) Oral daily  metoprolol succinate ER 25 milliGRAM(s) Oral daily  pantoprazole    Tablet 40 milliGRAM(s) Oral before breakfast    MEDICATIONS  (PRN):  dextrose 40% Gel 15 Gram(s) Oral once PRN Blood Glucose LESS THAN 70 milliGRAM(s)/deciliter  glucagon  Injectable 1 milliGRAM(s) IntraMuscular once PRN Glucose LESS THAN 70 milligrams/deciliter      Allergies    No Known Allergies    Intolerances        Vital Signs Last 24 Hrs  T(C): 36.5 (13 Jul 2019 07:56), Max: 36.8 (12 Jul 2019 20:41)  T(F): 97.7 (13 Jul 2019 07:56), Max: 98.3 (12 Jul 2019 20:41)  HR: 69 (13 Jul 2019 07:56) (64 - 80)  BP: 115/69 (13 Jul 2019 07:56) (106/70 - 133/76)  BP(mean): --  RR: 18 (13 Jul 2019 07:56) (18 - 18)  SpO2: 95% (13 Jul 2019 07:56) (92% - 99%)    I&O's Summary        PHYSICAL EXAM:  TELE: Afib   Constitutional: NAD, awake and alert, well-developed  HEENT: Moist Mucous Membranes, Anicteric  Pulmonary: Non-labored, breath sounds are clear bilaterally, No wheezing, crackles or rhonchi  Cardiovascular: Irregular, S1 and S2 nl, + murmur No rubs, gallops or clicks   Gastrointestinal: Bowel Sounds present, soft, nontender.   Lymph: No lymphadenopathy. No peripheral edema.  Skin: No visible rashes or ulcers.  Psych:  Mood & affect appropriate    LABS: All Labs Reviewed:                        12.6   23.03 )-----------( 250      ( 13 Jul 2019 06:17 )             44.5                         12.1   19.26 )-----------( 203      ( 12 Jul 2019 06:59 )             42.9                         11.7   20.60 )-----------( 213      ( 11 Jul 2019 09:53 )             41.4     13 Jul 2019 06:17    139    |  103    |  24     ----------------------------<  237    4.0     |  28     |  1.10   12 Jul 2019 06:59    140    |  105    |  18     ----------------------------<  235    4.9     |  29     |  1.10   11 Jul 2019 09:53    137    |  104    |  13     ----------------------------<  347    4.4     |  25     |  0.96     Ca    9.2        13 Jul 2019 06:17  Ca    9.3        12 Jul 2019 06:59  Ca    8.6        11 Jul 2019 09:53    TPro  6.3    /  Alb  3.4    /  TBili  1.7    /  DBili  .30    /  AST  14     /  ALT  21     /  AlkPhos  175    12 Jul 2019 06:59    PT/INR - ( 13 Jul 2019 06:17 )   PT: 17.7 sec;   INR: 1.55 ratio         PTT - ( 13 Jul 2019 06:17 )  PTT:40.7 sec    CG:  < from: 12 Lead ECG (07.09.19 @ 18:13) >  Ventricular Rate 68 BPM    Atrial Rate 234 BPM    QRS Duration 76 ms    Q-T Interval 402 ms    QTC Calculation(Bezet) 427 ms    R Axis -2 degrees    T Axis -17 degrees    Diagnosis Line Atrial fibrillation  T wave abnormality, consider inferior ischemia  Abnormal ECG  When compared with ECG of 14-DEC-2012 14:23,  No significant change was found  Confirmed by Ralf Macias MD ([id number]) on 7/10/2019 12:01:24 PM    < end of copied text >      Radiology:  < from: CT Chest w/ IV Cont (07.09.19 @ 20:12) >  EXAM:  CT ABDOMEN AND PELVIS OC IC                          EXAM:  CT CHEST IC                            PROCEDURE DATE:  07/09/2019          INTERPRETATION:  CLINICAL INFORMATION: Shortness of breath. Left lower   quadrant abdominal pain and diarrhea.    COMPARISON: None.    PROCEDURE:   CT of the Chest, Abdomen and Pelvis was performed with intravenous   contrast.   Intravenous contrast: 100 ml Omnipaque 350. 0 ml discarded.  Oral contrast: Positive contrast was administered.  Sagittal and coronal reformats were performed.    FINDINGS:    CHEST:     LUNGS AND LARGE AIRWAYS: Patent central airways. Geographic areas of mild   groundglass air space opacity, may reflect air trapping. 3 mm left lower   lobe pulmonary nodule (series 2, image 56). In the absence of known risk   factors, no further routine follow-up is recommended.  PLEURA: No pleural effusion.  VESSELS: Atherosclerosis of the thoracic aorta which is otherwise normal   in caliber.  HEART: Heart size is normal. No pericardialeffusion. Aortic valvular,   coronary artery, and mitral annular calcification.  MEDIASTINUM AND RASTA: Numerous small volume mediastinal and bilateral   hilar nodes.  CHEST WALL AND LOWER NECK: Within normal limits.    ABDOMEN AND PELVIS:    LIVER: Within normal limits.  BILE DUCTS: Normal caliber.  GALLBLADDER: Within normal limits.  SPLEEN: Splenomegaly measuring up to 16.0 cm in the AP dimension..  PANCREAS: Within normal limits.  ADRENALS: Within normal limits.  KIDNEYS/URETERS: Bilateral renal cysts and low-attenuation lesions too   small to accurately characterize. No hydronephrosis.    BLADDER: Within normal limits.  REPRODUCTIVE ORGANS: Uterus and adnexa within normal limits.    BOWEL: No bowel obstruction. Colonic diverticulosis without evidence of   acute diverticulitis. Appendix is normal.  PERITONEUM: No ascites.  VESSELS:  Atherosclerosis of the abdominal aorta and its branch vessels.  RETROPERITONEUM: No lymphadenopathy.    ABDOMINAL WALL: Within normal limits.  BONES: Status post open reduction internal fixation of the right hip.   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 Multilevel degenerative changes   of the spine.    IMPRESSION:     Nonspecific geographic groundglass airspace opacity, suggestive of air   trapping.    Numerous predominantly small volume mediastinal and bilateral hilar   nodes, of unclear etiology.    Colonic diverticulosis without diverticulitis or evidence of acute   inflammation. No bowel obstruction.    Splenomegaly.      ANA NEWTON, ATTENDING RADIOLOGIST  This document has been electronically signed. Jul 9 2019  8:57PM        < end of copied text >   < from: TTE Echo Doppler w/o Cont (07.11.19 @ 11:37) >     EXAM:  ECHO TTE WO CON COMP W DOPPLR         PROCEDURE DATE:  07/11/2019        INTERPRETATION:  INDICATION: dyspnea  Referring MELIECER:Joe  Blood Pressure 126/75        Weight (kg) :55     Height (cm):162       BSA (sq m): 1.6  Technician: PH    Dimensions:    LA 4.3       Normal Values: 2.0 - 4.0 cm    Ao 2.7        Normal Values: 2.0 - 3.8 cm  SEPTUM 1.1       Normal Values: 0.6 - 1.2 cm  PWT 1.1       Normal Values: 0.6 - 1.1 cm  LVIDd 4.0         Normal Values: 3.0 - 5.6 cm  LVIDs 2.0       Normal Values: 1.8 - 4.0 cm      OBSERVATIONS:  Technically difficult study  Mitral Valve: Dense mitral annular calcification and calcified mitral   leaflets. Mild mitral regurgitation.  Aortic Valve/Aorta: Normal trileaflet aortic valve.  Tricuspid Valve: Normal tricuspid valve. Mild tricuspid regurgitation.  Pulmonic Valve: The pulmonic valve is not well visualized. Probably   normal.  Left Atrium: Severe enlargement  Right Atrium: Mild enlargement  Left Ventricle: Overall preserved leftventricular systolic function. The   ejection fraction is approximately 60%.  Right Ventricle: Normal right ventricular size and function.  Pericardium/Pleura: No pericardial effusion noted.  Pulmonary/RV Pressure: The right ventricular systolic pressure is   estimated to be 61mmHg, assuming that the right atrial pressure is   estimated to be 20 mmHg. This is consistent with pulmonary hypertension.   The IVC is extremely dilated (2.8cm) and appears noncollapsible. The   right atrial pressure is estimated to be 15 to 20 mmHg.  LV Diastolic Function: E/e' significant elevated consistent with   increased left ventricular filling pressures    Conclusion: Technically difficult study. Overall preserved left   ventricular systolic function. EF 60%. Elevated left ventricular filling   pressures. Severe pulmonary hypertension with severely elevated right   atrial pressures based on an IVC diameter of 2.8 cm that is   noncollapsible.                  TON EDWARDS M.D., ATTENDING CARDIOLOGIST  This document has been electronically signed. Jul 12 2019  1:54PM                   Ralf Jeronimo [facility]   Cardiology [facility] -- Elida Douglas, Gilberto, Ayaz, Troy Coley Savella  Office # [phone]      Follow Up:    SOB, Dyspnea   Subjective/Observations:   No events overnight resting comfortably in bed.  No complaints of chest pain, or palpitations reported. No signs of orthopnea or PND. her dyspnea mildly improved.     REVIEW OF SYSTEMS: All other review of systems is negative unless indicated above    PAST MEDICAL & SURGICAL HISTORY:  Myeloproliferative disease  Anal fissure  Elevated red blood cell count  Atrial fibrillation: on coumadin  Skin cancer: of back - removed  Type 2 diabetes mellitus: not on insulin  Hypercholesterolemia  HTN (hypertension)  Myocardial infarction: 8 yrs ago  Hearing loss  S/P ORIF (open reduction internal fixation) fracture: right hip pinning  - 5 yrs ago  History of cataract surgery: 03/2016,  06/2016      MEDICATIONS  (STANDING):  ALBUTerol    0.083% 2.5 milliGRAM(s) Nebulizer every 12 hours  aspirin enteric coated 81 milliGRAM(s) Oral daily  atorvastatin 40 milliGRAM(s) Oral at bedtime  cholecalciferol 1000 Unit(s) Oral daily  dextrose 5%. 1000 milliLiter(s) (50 mL/Hr) IV Continuous <Continuous>  dextrose 50% Injectable 12.5 Gram(s) IV Push once  dextrose 50% Injectable 25 Gram(s) IV Push once  dextrose 50% Injectable 25 Gram(s) IV Push once  hydroxyurea 500 milliGRAM(s) Oral daily  insulin lispro (HumaLOG) corrective regimen sliding scale   SubCutaneous three times a day before meals  insulin lispro (HumaLOG) corrective regimen sliding scale   SubCutaneous at bedtime  lisinopril 20 milliGRAM(s) Oral daily  metoprolol succinate ER 25 milliGRAM(s) Oral daily  pantoprazole    Tablet 40 milliGRAM(s) Oral before breakfast    MEDICATIONS  (PRN):  dextrose 40% Gel 15 Gram(s) Oral once PRN Blood Glucose LESS THAN 70 milliGRAM(s)/deciliter  glucagon  Injectable 1 milliGRAM(s) IntraMuscular once PRN Glucose LESS THAN 70 milligrams/deciliter      Allergies    No Known Allergies    Intolerances        Vital Signs Last 24 Hrs  T(C): 36.5 (13 Jul 2019 07:56), Max: 36.8 (12 Jul 2019 20:41)  T(F): 97.7 (13 Jul 2019 07:56), Max: 98.3 (12 Jul 2019 20:41)  HR: 69 (13 Jul 2019 07:56) (64 - 80)  BP: 115/69 (13 Jul 2019 07:56) (106/70 - 133/76)  BP(mean): --  RR: 18 (13 Jul 2019 07:56) (18 - 18)  SpO2: 95% (13 Jul 2019 07:56) (92% - 99%)    I&O's Summary        PHYSICAL EXAM:  TELE: Afib   Constitutional: NAD, awake and alert, well-developed  HEENT: Moist Mucous Membranes, Anicteric  Pulmonary: Decreased breath sounds b/l. No rales, crackles or wheeze appreciated.   Cardiovascular: Irregular, S1 and S2 nl, + murmur No rubs, gallops or clicks   Gastrointestinal: Bowel Sounds present, soft, nontender.   Lymph: No lymphadenopathy. trace peripheral edema.  Skin: No visible rashes or ulcers.  Psych:  Mood & affect appropriate    LABS: All Labs Reviewed:                        12.6   23.03 )-----------( 250      ( 13 Jul 2019 06:17 )             44.5                         12.1   19.26 )-----------( 203      ( 12 Jul 2019 06:59 )             42.9                         11.7   20.60 )-----------( 213      ( 11 Jul 2019 09:53 )             41.4     13 Jul 2019 06:17    139    |  103    |  24     ----------------------------<  237    4.0     |  28     |  1.10   12 Jul 2019 06:59    140    |  105    |  18     ----------------------------<  235    4.9     |  29     |  1.10   11 Jul 2019 09:53    137    |  104    |  13     ----------------------------<  347    4.4     |  25     |  0.96     Ca    9.2        13 Jul 2019 06:17  Ca    9.3        12 Jul 2019 06:59  Ca    8.6        11 Jul 2019 09:53    TPro  6.3    /  Alb  3.4    /  TBili  1.7    /  DBili  .30    /  AST  14     /  ALT  21     /  AlkPhos  175    12 Jul 2019 06:59    PT/INR - ( 13 Jul 2019 06:17 )   PT: 17.7 sec;   INR: 1.55 ratio         PTT - ( 13 Jul 2019 06:17 )  PTT:40.7 sec    CG:  < from: 12 Lead ECG (07.09.19 @ 18:13) >  Ventricular Rate 68 BPM    Atrial Rate 234 BPM    QRS Duration 76 ms    Q-T Interval 402 ms    QTC Calculation(Bezet) 427 ms    R Axis -2 degrees    T Axis -17 degrees    Diagnosis Line Atrial fibrillation  T wave abnormality, consider inferior ischemia  Abnormal ECG  When compared with ECG of 14-DEC-2012 14:23,  No significant change was found  Confirmed by Gilberto SERRA, Ralf ([id number]) on 7/10/2019 12:01:24 PM    < end of copied text >      Radiology:  < from: CT Chest w/ IV Cont (07.09.19 @ 20:12) >  EXAM:  CT ABDOMEN AND PELVIS OC IC                          EXAM:  CT CHEST IC                            PROCEDURE DATE:  07/09/2019          INTERPRETATION:  CLINICAL INFORMATION: Shortness of breath. Left lower   quadrant abdominal pain and diarrhea.    COMPARISON: None.    PROCEDURE:   CT of the Chest, Abdomen and Pelvis was performed with intravenous   contrast.   Intravenous contrast: 100 ml Omnipaque 350. 0 ml discarded.  Oral contrast: Positive contrast was administered.  Sagittal and coronal reformats were performed.    FINDINGS:    CHEST:     LUNGS AND LARGE AIRWAYS: Patent central airways. Geographic areas of mild   groundglass air space opacity, may reflect air trapping. 3 mm left lower   lobe pulmonary nodule (series 2, image 56). In the absence of known risk   factors, no further routine follow-up is recommended.  PLEURA: No pleural effusion.  VESSELS: Atherosclerosis of the thoracic aorta which is otherwise normal   in caliber.  HEART: Heart size is normal. No pericardialeffusion. Aortic valvular,   coronary artery, and mitral annular calcification.  MEDIASTINUM AND RASTA: Numerous small volume mediastinal and bilateral   hilar nodes.  CHEST WALL AND LOWER NECK: Within normal limits.    ABDOMEN AND PELVIS:    LIVER: Within normal limits.  BILE DUCTS: Normal caliber.  GALLBLADDER: Within normal limits.  SPLEEN: Splenomegaly measuring up to 16.0 cm in the AP dimension..  PANCREAS: Within normal limits.  ADRENALS: Within normal limits.  KIDNEYS/URETERS: Bilateral renal cysts and low-attenuation lesions too   small to accurately characterize. No hydronephrosis.    BLADDER: Within normal limits.  REPRODUCTIVE ORGANS: Uterus and adnexa within normal limits.    BOWEL: No bowel obstruction. Colonic diverticulosis without evidence of   acute diverticulitis. Appendix is normal.  PERITONEUM: No ascites.  VESSELS:  Atherosclerosis of the abdominal aorta and its branch vessels.  RETROPERITONEUM: No lymphadenopathy.    ABDOMINAL WALL: Within normal limits.  BONES: Status post open reduction internal fixation of the right hip.   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 Multilevel degenerative changes   of the spine.    IMPRESSION:     Nonspecific geographic groundglass airspace opacity, suggestive of air   trapping.    Numerous predominantly small volume mediastinal and bilateral hilar   nodes, of unclear etiology.    Colonic diverticulosis without diverticulitis or evidence of acute   inflammation. No bowel obstruction.    Splenomegaly.      ANA NEWTON, ATTENDING RADIOLOGIST  This document has been electronically signed. Jul 9 2019  8:57PM        < end of copied text >   < from: TTE Echo Doppler w/o Cont (07.11.19 @ 11:37) >     EXAM:  ECHO TTE WO CON COMP W DOPPLR         PROCEDURE DATE:  07/11/2019        INTERPRETATION:  INDICATION: dyspnea  Referring M.D.:Joe  Blood Pressure 126/75        Weight (kg) :55     Height (cm):162       BSA (sq m): 1.6  Technician: PH    Dimensions:    LA 4.3       Normal Values: 2.0 - 4.0 cm    Ao 2.7        Normal Values: 2.0 - 3.8 cm  SEPTUM 1.1       Normal Values: 0.6 - 1.2 cm  PWT 1.1       Normal Values: 0.6 - 1.1 cm  LVIDd 4.0         Normal Values: 3.0 - 5.6 cm  LVIDs 2.0       Normal Values: 1.8 - 4.0 cm      OBSERVATIONS:  Technically difficult study  Mitral Valve: Dense mitral annular calcification and calcified mitral   leaflets. Mild mitral regurgitation.  Aortic Valve/Aorta: Normal trileaflet aortic valve.  Tricuspid Valve: Normal tricuspid valve. Mild tricuspid regurgitation.  Pulmonic Valve: The pulmonic valve is not well visualized. Probably   normal.  Left Atrium: Severe enlargement  Right Atrium: Mild enlargement  Left Ventricle: Overall preserved leftventricular systolic function. The   ejection fraction is approximately 60%.  Right Ventricle: Normal right ventricular size and function.  Pericardium/Pleura: No pericardial effusion noted.  Pulmonary/RV Pressure: The right ventricular systolic pressure is   estimated to be 61mmHg, assuming that the right atrial pressure is   estimated to be 20 mmHg. This is consistent with pulmonary hypertension.   The IVC is extremely dilated (2.8cm) and appears noncollapsible. The   right atrial pressure is estimated to be 15 to 20 mmHg.  LV Diastolic Function: E/e' significant elevated consistent with   increased left ventricular filling pressures    Conclusion: Technically difficult study. Overall preserved left   ventricular systolic function. EF 60%. Elevated left ventricular filling   pressures. Severe pulmonary hypertension with severely elevated right   atrial pressures based on an IVC diameter of 2.8 cm that is   noncollapsible.                  TON EDWARDS M.D., ATTENDING CARDIOLOGIST  This document has been electronically signed. Jul 12 2019  1:54PM                   Ralf Jeronimo [facility]   Cardiology

## 2019-07-13 NOTE — PROGRESS NOTE ADULT - ASSESSMENT
This is a 89 year old woman with a history of CAD s/p prior MI with a known chronically occluded RCA, moderate disease in the LCX and LAD that has been managed medically, normal LV function, hypertension, AF on AC with Coumadin, DM who has been having increased dyspnea and presents to the ED with respiratory distress and abdominal pain.    MUNOZ  - Unclear etiology of her dyspnea this could be her angina equivalent..    -   she did have a positive nuclear stress test that we elected to manage medically and has know residual CAD  - Her CE's x 2 sets are negative  No further need to trend  - echo with normal LV function but signs of elevated filling pressures and large IVC.   - S/P Lasix 40mg IV x 1 with some improvement   -CW telemetry    CAD  - No evidence of acute ischemia per EKG  - Continue ASA, BB, ACEI, and statin  - No further cardiac enzymes needed at this point  - IF no other etiology found, will consider coronary angiography if pt WBC is resolved.  Discussed with patient and son      Atrial fibrillation  - Her rate is controlled  - Would continue to hold coumadin in anticipation of CC  - Continue Toprol 25 QD  - Monitor and replete lytes    HTN  - Continue BB and ACEI    VTE ppx  - Start hep injection subcutaneous once INR below 2    Monitor and replete electrolytes. Keep K>4.0 and Mg>2.0.   Further cardiac workup pending clinical course  To follow closely with you  Ralf Jeronimo [facility]  Cardiology   Spectra #[phone] This is a 89 year old woman with a history of CAD s/p prior MI with a known chronically occluded RCA, moderate disease in the LCX and LAD that has been managed medically, normal LV function, hypertension, AF on AC with Coumadin, DM who has been having increased dyspnea and presents to the ED with respiratory distress and abdominal pain.    MUNOZ  - Unclear etiology of her dyspnea this could be her angina equivalent..    -   she did have a positive nuclear stress test that we elected to manage medically and has know residual CAD  - Her CE's x 2 sets are negative  No further need to trend  - echo with normal LV function but signs of elevated filling pressures and large IVC.   - S/P Lasix 40mg IV x 1 with some improvement will start lasix 40mg IV Qday.   -CW telemetry    CAD  - No evidence of acute ischemia per EKG  - Continue ASA, BB, ACEI, and statin  - No further cardiac enzymes needed at this point  - IF no other etiology found, will consider coronary angiography if pt WBC is resolved.  Discussed with patient and son      Atrial fibrillation  - Her rate is controlled  - Would continue to hold coumadin in anticipation of CC  - Continue Toprol 25 QD  - Monitor and replete lytes    HTN  - Continue BB and ACEI    VTE ppx  - Start hep injection subcutaneous once INR below 2    Monitor and replete electrolytes. Keep K>4.0 and Mg>2.0.   Further cardiac workup pending clinical course  To follow closely with you  Ralf Jeronimo [facility]  Cardiology   Spectra #[phone]

## 2019-07-14 LAB
ALBUMIN SERPL ELPH-MCNC: 3.7 G/DL — SIGNIFICANT CHANGE UP (ref 3.3–5)
ALP SERPL-CCNC: 177 U/L — HIGH (ref 40–120)
ALT FLD-CCNC: 17 U/L — SIGNIFICANT CHANGE UP (ref 12–78)
ANION GAP SERPL CALC-SCNC: 10 MMOL/L — SIGNIFICANT CHANGE UP (ref 5–17)
AST SERPL-CCNC: 10 U/L — LOW (ref 15–37)
BASOPHILS # BLD AUTO: 0 K/UL — SIGNIFICANT CHANGE UP (ref 0–0.2)
BASOPHILS NFR BLD AUTO: 0 % — SIGNIFICANT CHANGE UP (ref 0–2)
BILIRUB SERPL-MCNC: 1.1 MG/DL — SIGNIFICANT CHANGE UP (ref 0.2–1.2)
BUN SERPL-MCNC: 32 MG/DL — HIGH (ref 7–23)
CALCIUM SERPL-MCNC: 9.5 MG/DL — SIGNIFICANT CHANGE UP (ref 8.5–10.1)
CHLORIDE SERPL-SCNC: 99 MMOL/L — SIGNIFICANT CHANGE UP (ref 96–108)
CO2 SERPL-SCNC: 29 MMOL/L — SIGNIFICANT CHANGE UP (ref 22–31)
CREAT SERPL-MCNC: 1.2 MG/DL — SIGNIFICANT CHANGE UP (ref 0.5–1.3)
EOSINOPHIL # BLD AUTO: 0.28 K/UL — SIGNIFICANT CHANGE UP (ref 0–0.5)
EOSINOPHIL NFR BLD AUTO: 1 % — SIGNIFICANT CHANGE UP (ref 0–6)
GLUCOSE SERPL-MCNC: 226 MG/DL — HIGH (ref 70–99)
HCT VFR BLD CALC: 47.2 % — HIGH (ref 34.5–45)
HGB BLD-MCNC: 13.4 G/DL — SIGNIFICANT CHANGE UP (ref 11.5–15.5)
INR BLD: 1.46 RATIO — HIGH (ref 0.88–1.16)
LYMPHOCYTES # BLD AUTO: 10 % — LOW (ref 13–44)
LYMPHOCYTES # BLD AUTO: 2.79 K/UL — SIGNIFICANT CHANGE UP (ref 1–3.3)
MCHC RBC-ENTMCNC: 24.3 PG — LOW (ref 27–34)
MCHC RBC-ENTMCNC: 28.4 GM/DL — LOW (ref 32–36)
MCV RBC AUTO: 85.7 FL — SIGNIFICANT CHANGE UP (ref 80–100)
MONOCYTES # BLD AUTO: 1.11 K/UL — HIGH (ref 0–0.9)
MONOCYTES NFR BLD AUTO: 4 % — SIGNIFICANT CHANGE UP (ref 2–14)
NEUTROPHILS # BLD AUTO: 23.69 K/UL — HIGH (ref 1.8–7.4)
NEUTROPHILS NFR BLD AUTO: 84 % — HIGH (ref 43–77)
NRBC # BLD: SIGNIFICANT CHANGE UP /100 WBCS (ref 0–0)
PLATELET # BLD AUTO: 276 K/UL — SIGNIFICANT CHANGE UP (ref 150–400)
POTASSIUM SERPL-MCNC: 4.1 MMOL/L — SIGNIFICANT CHANGE UP (ref 3.5–5.3)
POTASSIUM SERPL-SCNC: 4.1 MMOL/L — SIGNIFICANT CHANGE UP (ref 3.5–5.3)
PROCALCITONIN SERPL-MCNC: 0.82 NG/ML — HIGH (ref 0–0.04)
PROT SERPL-MCNC: 6.7 G/DL — SIGNIFICANT CHANGE UP (ref 6–8.3)
PROTHROM AB SERPL-ACNC: 16.8 SEC — HIGH (ref 10–12.9)
RBC # BLD: 5.51 M/UL — HIGH (ref 3.8–5.2)
RBC # FLD: 18.1 % — HIGH (ref 10.3–14.5)
SODIUM SERPL-SCNC: 138 MMOL/L — SIGNIFICANT CHANGE UP (ref 135–145)
WBC # BLD: 27.87 K/UL — HIGH (ref 3.8–10.5)
WBC # FLD AUTO: 27.87 K/UL — HIGH (ref 3.8–10.5)

## 2019-07-14 PROCEDURE — 99232 SBSQ HOSP IP/OBS MODERATE 35: CPT

## 2019-07-14 PROCEDURE — 99233 SBSQ HOSP IP/OBS HIGH 50: CPT

## 2019-07-14 RX ORDER — FUROSEMIDE 40 MG
40 TABLET ORAL DAILY
Refills: 0 | Status: DISCONTINUED | OUTPATIENT
Start: 2019-07-15 | End: 2019-07-15

## 2019-07-14 RX ADMIN — Medication 40 MILLIGRAM(S): at 05:54

## 2019-07-14 RX ADMIN — LISINOPRIL 20 MILLIGRAM(S): 2.5 TABLET ORAL at 05:54

## 2019-07-14 RX ADMIN — Medication 2: at 08:34

## 2019-07-14 RX ADMIN — ATORVASTATIN CALCIUM 40 MILLIGRAM(S): 80 TABLET, FILM COATED ORAL at 21:19

## 2019-07-14 RX ADMIN — Medication 1000 UNIT(S): at 12:23

## 2019-07-14 RX ADMIN — ALBUTEROL 2.5 MILLIGRAM(S): 90 AEROSOL, METERED ORAL at 07:00

## 2019-07-14 RX ADMIN — Medication 1: at 22:00

## 2019-07-14 RX ADMIN — Medication 81 MILLIGRAM(S): at 12:23

## 2019-07-14 RX ADMIN — Medication 1: at 17:29

## 2019-07-14 RX ADMIN — Medication 25 MILLIGRAM(S): at 05:54

## 2019-07-14 RX ADMIN — Medication 3: at 12:23

## 2019-07-14 RX ADMIN — HYDROXYUREA 500 MILLIGRAM(S): 500 CAPSULE ORAL at 12:23

## 2019-07-14 RX ADMIN — ALBUTEROL 2.5 MILLIGRAM(S): 90 AEROSOL, METERED ORAL at 20:15

## 2019-07-14 RX ADMIN — PANTOPRAZOLE SODIUM 40 MILLIGRAM(S): 20 TABLET, DELAYED RELEASE ORAL at 05:54

## 2019-07-14 NOTE — PROGRESS NOTE ADULT - PROBLEM SELECTOR PLAN 1
seen and examined  now on IV LASIX  Cardio follow up noted  may need Cardiac Cath  ID eval noted  WBC - likely due to MPD  I kamryn  tolerating room air - on tele monitor - pulse ox and cardiac monitoring  am labs pending  replete lytes  will follow

## 2019-07-14 NOTE — PROGRESS NOTE ADULT - SUBJECTIVE AND OBJECTIVE BOX
[facility] -- Elida Douglas, Ayaz Macias, Troy Coley Savella  Office # [phone]    Follow Up:  SOB, Dyspnea     Subjective/Observations: Seen and evaluated, comfortable on RA at 30 degree angle.  Denies SOB or MUNOZ.  I positioned her with her HOB flat, no orthopnea noted.  Denies CP or palpitations    REVIEW OF SYSTEMS: All other review of systems is negative unless indicated above  PAST MEDICAL & SURGICAL HISTORY:  Myeloproliferative disease  Anal fissure  Elevated red blood cell count  Atrial fibrillation: on coumadin  Skin cancer: of back - removed  Type 2 diabetes mellitus: not on insulin  Hypercholesterolemia  HTN (hypertension)  Myocardial infarction: 8 yrs ago  Hearing loss  S/P ORIF (open reduction internal fixation) fracture: right hip pinning  - 5 yrs ago  History of cataract surgery: 03/2016,  06/2016    MEDICATIONS  (STANDING):  ALBUTerol    0.083% 2.5 milliGRAM(s) Nebulizer every 12 hours  aspirin enteric coated 81 milliGRAM(s) Oral daily  atorvastatin 40 milliGRAM(s) Oral at bedtime  cholecalciferol 1000 Unit(s) Oral daily  dextrose 5%. 1000 milliLiter(s) (50 mL/Hr) IV Continuous <Continuous>  dextrose 50% Injectable 12.5 Gram(s) IV Push once  dextrose 50% Injectable 25 Gram(s) IV Push once  dextrose 50% Injectable 25 Gram(s) IV Push once  furosemide   Injectable 40 milliGRAM(s) IV Push daily  hydroxyurea 500 milliGRAM(s) Oral daily  insulin lispro (HumaLOG) corrective regimen sliding scale   SubCutaneous three times a day before meals  insulin lispro (HumaLOG) corrective regimen sliding scale   SubCutaneous at bedtime  lisinopril 20 milliGRAM(s) Oral daily  metoprolol succinate ER 25 milliGRAM(s) Oral daily  pantoprazole    Tablet 40 milliGRAM(s) Oral before breakfast    MEDICATIONS  (PRN):  dextrose 40% Gel 15 Gram(s) Oral once PRN Blood Glucose LESS THAN 70 milliGRAM(s)/deciliter  glucagon  Injectable 1 milliGRAM(s) IntraMuscular once PRN Glucose LESS THAN 70 milligrams/deciliter    Allergies    No Known Allergies    Intolerances    Vital Signs Last 24 Hrs  T(C): 36.4 (14 Jul 2019 11:58), Max: 36.7 (13 Jul 2019 19:00)  T(F): 97.5 (14 Jul 2019 11:58), Max: 98 (13 Jul 2019 19:00)  HR: 82 (14 Jul 2019 11:58) (60 - 82)  BP: 116/70 (14 Jul 2019 11:58) (104/64 - 118/81)  BP(mean): --  RR: 20 (14 Jul 2019 11:58) (19 - 20)  SpO2: 97% (14 Jul 2019 11:58) (92% - 100%)  I&O's Summary      PHYSICAL EXAM:  TELE: Afib  Constitutional: NAD, awake and alert, well-developed  HEENT: Moist Mucous Membranes, Anicteric  Pulmonary: Non-labored, breath sounds are clear bilaterally, No wheezing, rales or rhonchi  Cardiovascular: irregularly irregular, S1 and S2, + murmurs.  No rubs, gallops or clicks  Gastrointestinal: Bowel Sounds present, soft, nontender.   Lymph: No peripheral edema. No lymphadenopathy.  Skin: No visible rashes or ulcers.  Psych:  Mood & affect appropriate  LABS: All Labs Reviewed:                        13.4   27.87 )-----------( 276      ( 14 Jul 2019 07:18 )             47.2                         12.6   23.03 )-----------( 250      ( 13 Jul 2019 06:17 )             44.5                         12.1   19.26 )-----------( 203      ( 12 Jul 2019 06:59 )             42.9     14 Jul 2019 07:18    138    |  99     |  32     ----------------------------<  226    4.1     |  29     |  1.20   13 Jul 2019 06:17    139    |  103    |  24     ----------------------------<  237    4.0     |  28     |  1.10   12 Jul 2019 06:59    140    |  105    |  18     ----------------------------<  235    4.9     |  29     |  1.10     Ca    9.5        14 Jul 2019 07:18  Ca    9.2        13 Jul 2019 06:17  Ca    9.3        12 Jul 2019 06:59    TPro  6.7    /  Alb  3.7    /  TBili  1.1    /  DBili  x      /  AST  10     /  ALT  17     /  AlkPhos  177    14 Jul 2019 07:18  TPro  6.3    /  Alb  3.4    /  TBili  1.7    /  DBili  .30    /  AST  14     /  ALT  21     /  AlkPhos  175    12 Jul 2019 06:59    PT/INR - ( 14 Jul 2019 07:18 )   PT: 16.8 sec;   INR: 1.46 ratio      < from: TTE Echo Doppler w/o Cont (07.11.19 @ 11:37) >     EXAM:  ECHO TTE WO CON COMP W DOPPLR         PROCEDURE DATE:  07/11/2019        INTERPRETATION:  INDICATION: dyspnea  Referring M.D.:oJe  Blood Pressure 126/75        Weight (kg) :55     Height (cm):162       BSA (sq m): 1.6  Technician: PH    Dimensions:    LA 4.3       Normal Values: 2.0 - 4.0 cm    Ao 2.7        Normal Values: 2.0 - 3.8 cm  SEPTUM 1.1       Normal Values: 0.6 - 1.2 cm  PWT 1.1       Normal Values: 0.6 - 1.1 cm  LVIDd 4.0         Normal Values: 3.0 - 5.6 cm  LVIDs 2.0       Normal Values: 1.8 - 4.0 cm      OBSERVATIONS:  Technically difficult study  Mitral Valve: Dense mitral annular calcification and calcified mitral   leaflets. Mild mitral regurgitation.  Aortic Valve/Aorta: Normal trileaflet aortic valve.  Tricuspid Valve: Normal tricuspid valve. Mild tricuspid regurgitation.  Pulmonic Valve: The pulmonic valve is not well visualized. Probably   normal.  Left Atrium: Severe enlargement  Right Atrium: Mild enlargement  Left Ventricle: Overall preserved leftventricular systolic function. The   ejection fraction is approximately 60%.  Right Ventricle: Normal right ventricular size and function.  Pericardium/Pleura: No pericardial effusion noted.  Pulmonary/RV Pressure: The right ventricular systolic pressure is   estimated to be 61mmHg, assuming that the right atrial pressure is   estimated to be 20 mmHg. This is consistent with pulmonary hypertension.   The IVC is extremely dilated (2.8cm) and appears noncollapsible. The   right atrial pressure is estimated to be 15 to 20 mmHg.  LV Diastolic Function: E/e' significant elevated consistent with   increased left ventricular filling pressures    Conclusion: Technically difficult study. Overall preserved left   ventricular systolic function. EF 60%. Elevated left ventricular filling   pressures. Severe pulmonary hypertension with severely elevated right   atrial pressures based on an IVC diameter of 2.8 cm that is   noncollapsible.      TON EDWARDS M.D., ATTENDING CARDIOLOGIST  This document has been electronically signed. Jul 12 2019  1:54PM  < end of copied text >    PTT - ( 13 Jul 2019 06:17 )  PTT:40.7 sec    < from: CT Chest w/ IV Cont (07.09.19 @ 20:12) >    EXAM:  CT ABDOMEN AND PELVIS OC IC                          EXAM:  CT CHEST IC                          PROCEDURE DATE:  07/09/2019      INTERPRETATION:  CLINICAL INFORMATION: Shortness of breath. Left lower   quadrant abdominal pain and diarrhea.    COMPARISON: None.    PROCEDURE:   CT of the Chest, Abdomen and Pelvis was performed with intravenous   contrast.   Intravenous contrast: 100 ml Omnipaque 350. 0 ml discarded.  Oral contrast: Positive contrast was administered.  Sagittal and coronal reformats were performed.    FINDINGS:    CHEST:     LUNGS AND LARGE AIRWAYS: Patent central airways. Geographic areas of mild   groundglass air space opacity, may reflect air trapping. 3 mm left lower   lobe pulmonary nodule (series 2, image 56). In the absence of known risk   factors, no further routine follow-up is recommended.  PLEURA: No pleural effusion.  VESSELS: Atherosclerosis of the thoracic aorta which is otherwise normal   in caliber.  HEART: Heart size is normal. No pericardialeffusion. Aortic valvular,   coronary artery, and mitral annular calcification.  MEDIASTINUM AND RASTA: Numerous small volume mediastinal and bilateral   hilar nodes.  CHEST WALL AND LOWER NECK: Within normal limits.    ABDOMEN AND PELVIS:    LIVER: Within normal limits.  BILE DUCTS: Normal caliber.  GALLBLADDER: Within normal limits.  SPLEEN: Splenomegaly measuring up to 16.0 cm in the AP dimension..  PANCREAS: Within normal limits.  ADRENALS: Within normal limits.  KIDNEYS/URETERS: Bilateral renal cysts and low-attenuation lesions too   small to accurately characterize. No hydronephrosis.    BLADDER: Within normal limits.  REPRODUCTIVE ORGANS: Uterus and adnexa within normal limits.    BOWEL: No bowel obstruction. Colonic diverticulosis without evidence of   acute diverticulitis. Appendix is normal.  PERITONEUM: No ascites.  VESSELS:  Atherosclerosis of the abdominal aorta and its branch vessels.  RETROPERITONEUM: No lymphadenopathy.    ABDOMINAL WALL: Within normal limits.  BONES: Status post open reduction internal fixation of the right hip.   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 Multilevel degenerative changes   of the spine.    IMPRESSION:     Nonspecific geographic groundglass airspace opacity, suggestive of air   trapping.    Numerous predominantly small volume mediastinal and bilateral hilar   nodes, of unclear etiology.    Colonic diverticulosis without diverticulitis or evidence of acute   inflammation. No bowel obstruction.    Splenomegaly.    ANA NEWTON, ATTENDING RADIOLOGIST  This document has been electronically signed. Jul 9 2019  8:57PM     < end of copied text >    < from: 12 Lead ECG (07.09.19 @ 18:13) >    Ventricular Rate 68 BPM    Atrial Rate 234 BPM    QRS Duration 76 ms    Q-T Interval 402 ms    QTC Calculation(Bezet) 427 ms    R Axis -2 degrees    T Axis -17 degrees    Diagnosis Line Atrial fibrillation  T wave abnormality, consider inferior ischemia  Abnormal ECG  When compared with ECG of 14-DEC-2012 14:23,  No significant change was found  Confirmed by Gilberto SERRA, Ralf ([id number]) on 7/10/2019 12:01:24 PM    < end of copied text >

## 2019-07-14 NOTE — PROGRESS NOTE ADULT - PROBLEM SELECTOR PLAN 1
- acute poss sec to worsening cad requiring cath monday 7/15/2019  -  Unremarkable CXR  - Echo: EF 60%, Severe pulmonary hypertension  - Cardiac enzymes negative x 3  - CT chest negative for pleural effusion or volume overload  - possible s/p angina. H/o positive nuclear stress test per cardio.

## 2019-07-14 NOTE — PROGRESS NOTE ADULT - SUBJECTIVE AND OBJECTIVE BOX
Date/Time Patient Seen:  		  Referring MD:   Data Reviewed	       Patient is a 89y old  Female who presents with a chief complaint of sob/abdominal pain (13 Jul 2019 15:11)      Subjective/HPI     PAST MEDICAL & SURGICAL HISTORY:  Myeloproliferative disease  Anal fissure  Elevated red blood cell count  Atrial fibrillation: on coumadin  Skin cancer: of back - removed  Type 2 diabetes mellitus: not on insulin  Hypercholesterolemia  HTN (hypertension)  Myocardial infarction: 8 yrs ago  Hearing loss  S/P ORIF (open reduction internal fixation) fracture: right hip pinning  - 5 yrs ago  History of cataract surgery: 03/2016,  06/2016        Medication list         MEDICATIONS  (STANDING):  ALBUTerol    0.083% 2.5 milliGRAM(s) Nebulizer every 12 hours  aspirin enteric coated 81 milliGRAM(s) Oral daily  atorvastatin 40 milliGRAM(s) Oral at bedtime  cholecalciferol 1000 Unit(s) Oral daily  dextrose 5%. 1000 milliLiter(s) (50 mL/Hr) IV Continuous <Continuous>  dextrose 50% Injectable 12.5 Gram(s) IV Push once  dextrose 50% Injectable 25 Gram(s) IV Push once  dextrose 50% Injectable 25 Gram(s) IV Push once  furosemide   Injectable 40 milliGRAM(s) IV Push daily  hydroxyurea 500 milliGRAM(s) Oral daily  insulin lispro (HumaLOG) corrective regimen sliding scale   SubCutaneous three times a day before meals  insulin lispro (HumaLOG) corrective regimen sliding scale   SubCutaneous at bedtime  lisinopril 20 milliGRAM(s) Oral daily  metoprolol succinate ER 25 milliGRAM(s) Oral daily  pantoprazole    Tablet 40 milliGRAM(s) Oral before breakfast    MEDICATIONS  (PRN):  dextrose 40% Gel 15 Gram(s) Oral once PRN Blood Glucose LESS THAN 70 milliGRAM(s)/deciliter  glucagon  Injectable 1 milliGRAM(s) IntraMuscular once PRN Glucose LESS THAN 70 milligrams/deciliter         Vitals log        ICU Vital Signs Last 24 Hrs  T(C): 36.6 (14 Jul 2019 04:45), Max: 37 (13 Jul 2019 16:14)  T(F): 97.8 (14 Jul 2019 04:45), Max: 98.6 (13 Jul 2019 16:14)  HR: 70 (14 Jul 2019 04:45) (64 - 80)  BP: 118/81 (14 Jul 2019 04:45) (104/64 - 118/81)  BP(mean): --  ABP: --  ABP(mean): --  RR: 20 (14 Jul 2019 04:45) (18 - 20)  SpO2: 92% (14 Jul 2019 04:45) (92% - 96%)           Input and Output:  I&O's Detail      Lab Data                        12.6   23.03 )-----------( 250      ( 13 Jul 2019 06:17 )             44.5     07-13    139  |  103  |  24<H>  ----------------------------<  237<H>  4.0   |  28  |  1.10    Ca    9.2      13 Jul 2019 06:17    TPro  6.3  /  Alb  3.4  /  TBili  1.7<H>  /  DBili  .30<H>  /  AST  14<L>  /  ALT  21  /  AlkPhos  175<H>  07-12            Review of Systems	      Objective     Physical Examination  on room air  head at  heart s1s2  lung dec BS        Pertinent Lab findings & Imaging      Shira:  SARAH   Adequate UO     I&O's Detail           Discussed with:     Cultures:	        Radiology

## 2019-07-14 NOTE — PROGRESS NOTE ADULT - PROBLEM SELECTOR PROBLEM 3
CAD (coronary artery disease)
CAD (coronary artery disease)
Leukocytosis
CAD (coronary artery disease)
CAD (coronary artery disease)

## 2019-07-14 NOTE — PROGRESS NOTE ADULT - PROBLEM SELECTOR PROBLEM 7
Need for prophylactic measure
Need for prophylactic measure
Myeloproliferative disease
Need for prophylactic measure
Need for prophylactic measure

## 2019-07-14 NOTE — PROGRESS NOTE ADULT - PROBLEM SELECTOR PLAN 2
- idiopathic, may be reactive  -  Per ID, no evidence of infection found to explain leukocytosis. Noninfectious, most likely 2/2 underlying myeloproliferative process. Possible P. vera or other Willy-2 related eitology?

## 2019-07-14 NOTE — PROGRESS NOTE ADULT - PROBLEM SELECTOR PLAN 3
- acute on chronic  - Continue ASA, metoprolol, lisinopril, atorvastatin  - EKG negative for ischemic process  - Echo showed EF 60%, Severe pulmonary hypertension  - Continue ASA, metoprolol, lisinopril, atorvastatin   - cath for monday

## 2019-07-14 NOTE — PROGRESS NOTE ADULT - PROBLEM SELECTOR PROBLEM 2
Leukocytosis
Shortness of breath
Leukocytosis
Leukocytosis

## 2019-07-14 NOTE — PROGRESS NOTE ADULT - PROBLEM SELECTOR PLAN 4
-chronic, rate controlled  -Coumadin held for potential cath on monday  -inr decreased and ready for cath  -apprec cardio collaboration

## 2019-07-14 NOTE — PROGRESS NOTE ADULT - ASSESSMENT
This is a 89 year old woman with a history of CAD s/p prior MI with a known chronically occluded RCA, moderate disease in the LCX and LAD that has been managed medically, normal LV function, hypertension, AF on AC with Coumadin, DM who has been having increased dyspnea and presents to the ED with respiratory distress and abdominal pain.    MUNOZ  - Unclear etiology of her dyspnea could be her angina equivalent..    - She did have a positive nuclear stress test that we elected to manage medically and has known residual CAD  - Her CE's x 2 sets are negative  No further need to trend  - Echo with normal LV function but signs of elevated filling pressures and large IVC.   - She has been on Lasix 40mg IV Qday.  She is now euvolemic.  Switch to PO in am  - CW telemetry for now.  So far, no arrhythmias noted    CAD  - No evidence of acute ischemia per EKG  - Continue ASA, BB, ACEI, and statin  - We will attempt to call for transfer to [facility] for RH/LHC in am if she remains stable and her renal function stable.  Discussed with patient and son.  She has persistent leukocytosis but she is cleared by ID    Atrial fibrillation  - Her rate remains controlled  - Continue to hold Coumadin in anticipation of her RH and LHC possibly in am  - Continue Toprol 25 QD  - Monitor and replete electrolytes. Keep K>4.0 and Mg>2.0.     HTN  - Continue BB and ACEI    VTE ppx  - Per Primary    Further cardiac workup pending clinical course  To follow closely with you    Brinda Thompson [facility]  Cardiology   Spectra #[phone]

## 2019-07-14 NOTE — PROGRESS NOTE ADULT - SUBJECTIVE AND OBJECTIVE BOX
Patient is a 89y old  Female who presents with a chief complaint of sob/abdominal pain (14 Jul 2019 16:33)      INTERVAL HPI: Pt seen and examined. no acute complaints at this time.     OVERNIGHT EVENTS: none noted  T(F): 97.3 (07-14-19 @ 17:11), Max: 98 (07-13-19 @ 19:00)  HR: 66 (07-14-19 @ 17:11) (60 - 82)  BP: 127/78 (07-14-19 @ 17:11) (104/64 - 127/78)  RR: 26 (07-14-19 @ 17:11) (19 - 26)  SpO2: 99% (07-14-19 @ 17:11) (92% - 100%)  I&O's Summary      REVIEW OF SYSTEMS:  CONSTITUTIONAL: No fever, weight loss, or fatigue  EYES: No eye pain, visual disturbances, or discharge  ENMT:  No difficulty hearing, tinnitus, vertigo; No sinus or throat pain  NECK: No pain or stiffness  BREASTS: No pain, masses, or nipple discharge  RESPIRATORY: No cough, wheezing, chills or hemoptysis; No shortness of breath  CARDIOVASCULAR: No chest pain, palpitations, dizziness, or leg swelling  GASTROINTESTINAL: No abdominal or epigastric pain. No nausea, vomiting, or hematemesis; No diarrhea or constipation. No melena or hematochezia.  GENITOURINARY: No dysuria, frequency, hematuria, or incontinence  NEUROLOGICAL: No headaches, memory loss, loss of strength, numbness, or tremors  SKIN: No itching, burning, rashes, or lesions   LYMPH NODES: No enlarged glands  ENDOCRINE: No heat or cold intolerance; No hair loss  MUSCULOSKELETAL: No joint pain or swelling; No muscle, back, or extremity pain  PSYCHIATRIC: No depression, anxiety, mood swings, or difficulty sleeping  HEME/LYMPH: No easy bruising, or bleeding gums  ALLERY AND IMMUNOLOGIC: No hives or eczema    PHYSICAL EXAM:  GENERAL: NAD, well-groomed, well-developed  HEAD:  Atraumatic, Normocephalic  EYES: EOMI, PERRLA, conjunctiva and sclera clear  ENMT: No tonsillar erythema, exudates, or enlargement; Moist mucous membranes, Good dentition, No lesions  NECK: Supple, No JVD, Normal thyroid  NERVOUS SYSTEM:  Alert & Oriented X3, Good concentration; Motor Strength 5/5 B/L upper and lower extremities; DTRs 2+ intact and symmetric  CHEST/LUNG: Clear to percussion bilaterally; No rales, rhonchi, wheezing, or rubs  HEART: Regular rate and rhythm; No murmurs, rubs, or gallops  ABDOMEN: Soft, Nontender, Nondistended; Bowel sounds present  EXTREMITIES:  2+ Peripheral Pulses, No clubbing, cyanosis, or edema  LYMPH: No lymphadenopathy noted  SKIN: No rashes or lesions    LABS:                        13.4   27.87 )-----------( 276      ( 14 Jul 2019 07:18 )             47.2     07-14    138  |  99  |  32<H>  ----------------------------<  226<H>  4.1   |  29  |  1.20    Ca    9.5      14 Jul 2019 07:18    TPro  6.7  /  Alb  3.7  /  TBili  1.1  /  DBili  x   /  AST  10<L>  /  ALT  17  /  AlkPhos  177<H>  07-14    PT/INR - ( 14 Jul 2019 07:18 )   PT: 16.8 sec;   INR: 1.46 ratio         PTT - ( 13 Jul 2019 06:17 )  PTT:40.7 sec    CAPILLARY BLOOD GLUCOSE      POCT Blood Glucose.: 188 mg/dL (14 Jul 2019 16:59)  POCT Blood Glucose.: 299 mg/dL (14 Jul 2019 12:22)  POCT Blood Glucose.: 231 mg/dL (14 Jul 2019 08:15)  POCT Blood Glucose.: 296 mg/dL (13 Jul 2019 22:13)              MEDICATIONS  (STANDING):  ALBUTerol    0.083% 2.5 milliGRAM(s) Nebulizer every 12 hours  aspirin enteric coated 81 milliGRAM(s) Oral daily  atorvastatin 40 milliGRAM(s) Oral at bedtime  cholecalciferol 1000 Unit(s) Oral daily  dextrose 5%. 1000 milliLiter(s) (50 mL/Hr) IV Continuous <Continuous>  dextrose 50% Injectable 12.5 Gram(s) IV Push once  dextrose 50% Injectable 25 Gram(s) IV Push once  dextrose 50% Injectable 25 Gram(s) IV Push once  hydroxyurea 500 milliGRAM(s) Oral daily  insulin lispro (HumaLOG) corrective regimen sliding scale   SubCutaneous three times a day before meals  insulin lispro (HumaLOG) corrective regimen sliding scale   SubCutaneous at bedtime  lisinopril 20 milliGRAM(s) Oral daily  metoprolol succinate ER 25 milliGRAM(s) Oral daily  pantoprazole    Tablet 40 milliGRAM(s) Oral before breakfast    MEDICATIONS  (PRN):  dextrose 40% Gel 15 Gram(s) Oral once PRN Blood Glucose LESS THAN 70 milliGRAM(s)/deciliter  glucagon  Injectable 1 milliGRAM(s) IntraMuscular once PRN Glucose LESS THAN 70 milligrams/deciliter Patient is a 89y old  Female who presents with a chief complaint of sob/abdominal pain (14 Jul 2019 16:33)      INTERVAL HPI: Pt seen and examined. no acute complaints at this time.     OVERNIGHT EVENTS: none noted  T(F): 97.3 (07-14-19 @ 17:11), Max: 98 (07-13-19 @ 19:00)  HR: 66 (07-14-19 @ 17:11) (60 - 82)  BP: 127/78 (07-14-19 @ 17:11) (104/64 - 127/78)  RR: 26 (07-14-19 @ 17:11) (19 - 26)  SpO2: 99% (07-14-19 @ 17:11) (92% - 100%)  I&O's Summary      REVIEW OF SYSTEMS:  CONSTITUTIONAL: No fever, weight loss, or fatigue  RESPIRATORY: No cough, wheezing, chills or hemoptysis; No shortness of breath  CARDIOVASCULAR: No chest pain, palpitations, dizziness, or leg swelling  GASTROINTESTINAL: No abdominal or epigastric pain. No nausea, vomiting, or hematemesis; No diarrhea or constipation. No melena or hematochezia.  GENITOURINARY: No dysuria, frequency, hematuria, or incontinence  NEUROLOGICAL: No headaches, memory loss, loss of strength, numbness, or tremors  SKIN: No itching, burning, rashes, or lesions   MUSCULOSKELETAL: No joint pain or swelling; No muscle, back, or extremity pain  PSYCHIATRIC: No depression, anxiety, mood swings, or difficulty sleeping      PHYSICAL EXAM:  GENERAL: NAD, elder, some mild cognitive impairment  NERVOUS SYSTEM:  Alert & Oriented X3, Good concentration; Motor Strength 4/5 B/L upper and lower extremities  CHEST/LUNG: Clear to percussion bilaterally; No rales, rhonchi, wheezing, or rubs  HEART: Regular rate and rhythm; No murmurs, rubs, or gallops  ABDOMEN: Soft, Nontender, Nondistended; Bowel sounds present  EXTREMITIES:  2+ Peripheral Pulses, No clubbing, cyanosis, or edema  SKIN: No rashes or lesions    LABS:                        13.4   27.87 )-----------( 276      ( 14 Jul 2019 07:18 )             47.2     07-14    138  |  99  |  32<H>  ----------------------------<  226<H>  4.1   |  29  |  1.20    Ca    9.5      14 Jul 2019 07:18    TPro  6.7  /  Alb  3.7  /  TBili  1.1  /  DBili  x   /  AST  10<L>  /  ALT  17  /  AlkPhos  177<H>  07-14    PT/INR - ( 14 Jul 2019 07:18 )   PT: 16.8 sec;   INR: 1.46 ratio         PTT - ( 13 Jul 2019 06:17 )  PTT:40.7 sec    CAPILLARY BLOOD GLUCOSE      POCT Blood Glucose.: 188 mg/dL (14 Jul 2019 16:59)  POCT Blood Glucose.: 299 mg/dL (14 Jul 2019 12:22)  POCT Blood Glucose.: 231 mg/dL (14 Jul 2019 08:15)  POCT Blood Glucose.: 296 mg/dL (13 Jul 2019 22:13)              MEDICATIONS  (STANDING):  ALBUTerol    0.083% 2.5 milliGRAM(s) Nebulizer every 12 hours  aspirin enteric coated 81 milliGRAM(s) Oral daily  atorvastatin 40 milliGRAM(s) Oral at bedtime  cholecalciferol 1000 Unit(s) Oral daily  dextrose 5%. 1000 milliLiter(s) (50 mL/Hr) IV Continuous <Continuous>  dextrose 50% Injectable 12.5 Gram(s) IV Push once  dextrose 50% Injectable 25 Gram(s) IV Push once  dextrose 50% Injectable 25 Gram(s) IV Push once  hydroxyurea 500 milliGRAM(s) Oral daily  insulin lispro (HumaLOG) corrective regimen sliding scale   SubCutaneous three times a day before meals  insulin lispro (HumaLOG) corrective regimen sliding scale   SubCutaneous at bedtime  lisinopril 20 milliGRAM(s) Oral daily  metoprolol succinate ER 25 milliGRAM(s) Oral daily  pantoprazole    Tablet 40 milliGRAM(s) Oral before breakfast    MEDICATIONS  (PRN):  dextrose 40% Gel 15 Gram(s) Oral once PRN Blood Glucose LESS THAN 70 milliGRAM(s)/deciliter  glucagon  Injectable 1 milliGRAM(s) IntraMuscular once PRN Glucose LESS THAN 70 milligrams/deciliter

## 2019-07-14 NOTE — PROGRESS NOTE ADULT - NSHPATTENDINGPLANDISCUSS_GEN_ALL_CORE
WENDY Leong
Dr. Sánchez ID, Dr. Dodd intern resident, tele care team
Dr Dodd intern resident, tele care team, daughter in law in ED, son/hcp/caretaker in evening
son/hcp/caretaker at bedside

## 2019-07-14 NOTE — PROGRESS NOTE ADULT - PROBLEM SELECTOR PROBLEM 5
HTN (hypertension)
Type 2 diabetes mellitus
HTN (hypertension)

## 2019-07-15 ENCOUNTER — TRANSCRIPTION ENCOUNTER (OUTPATIENT)
Age: 84
End: 2019-07-15

## 2019-07-15 ENCOUNTER — INPATIENT (INPATIENT)
Facility: HOSPITAL | Age: 84
LOS: 3 days | Discharge: HOME | DRG: 247 | End: 2019-07-19
Attending: INTERNAL MEDICINE | Admitting: INTERNAL MEDICINE
Payer: COMMERCIAL

## 2019-07-15 VITALS
OXYGEN SATURATION: 98 % | WEIGHT: 119.93 LBS | SYSTOLIC BLOOD PRESSURE: 127 MMHG | TEMPERATURE: 97 F | HEART RATE: 74 BPM | HEIGHT: 63 IN | DIASTOLIC BLOOD PRESSURE: 85 MMHG | RESPIRATION RATE: 16 BRPM

## 2019-07-15 VITALS
TEMPERATURE: 98 F | RESPIRATION RATE: 19 BRPM | SYSTOLIC BLOOD PRESSURE: 140 MMHG | DIASTOLIC BLOOD PRESSURE: 82 MMHG | OXYGEN SATURATION: 99 % | HEART RATE: 70 BPM

## 2019-07-15 DIAGNOSIS — Z96.7 PRESENCE OF OTHER BONE AND TENDON IMPLANTS: Chronic | ICD-10-CM

## 2019-07-15 DIAGNOSIS — I20.9 ANGINA PECTORIS, UNSPECIFIED: ICD-10-CM

## 2019-07-15 DIAGNOSIS — Z98.49 CATARACT EXTRACTION STATUS, UNSPECIFIED EYE: Chronic | ICD-10-CM

## 2019-07-15 PROBLEM — E11.9 TYPE 2 DIABETES MELLITUS WITHOUT COMPLICATIONS: Chronic | Status: ACTIVE | Noted: 2017-07-31

## 2019-07-15 PROBLEM — I48.91 UNSPECIFIED ATRIAL FIBRILLATION: Chronic | Status: ACTIVE | Noted: 2017-07-31

## 2019-07-15 PROBLEM — D47.1 CHRONIC MYELOPROLIFERATIVE DISEASE: Chronic | Status: ACTIVE | Noted: 2019-07-09

## 2019-07-15 LAB
ANION GAP SERPL CALC-SCNC: 9 MMOL/L — SIGNIFICANT CHANGE UP (ref 5–17)
APTT BLD: 39.7 SEC — HIGH (ref 27.5–36.3)
BASOPHILS # BLD AUTO: 0.28 K/UL — HIGH (ref 0–0.2)
BASOPHILS NFR BLD AUTO: 1 % — SIGNIFICANT CHANGE UP (ref 0–2)
BUN SERPL-MCNC: 40 MG/DL — HIGH (ref 7–23)
CALCIUM SERPL-MCNC: 9 MG/DL — SIGNIFICANT CHANGE UP (ref 8.5–10.1)
CHLORIDE SERPL-SCNC: 103 MMOL/L — SIGNIFICANT CHANGE UP (ref 96–108)
CO2 SERPL-SCNC: 28 MMOL/L — SIGNIFICANT CHANGE UP (ref 22–31)
CREAT SERPL-MCNC: 1.2 MG/DL — SIGNIFICANT CHANGE UP (ref 0.5–1.3)
CULTURE RESULTS: SIGNIFICANT CHANGE UP
CULTURE RESULTS: SIGNIFICANT CHANGE UP
EOSINOPHIL # BLD AUTO: 0.19 K/UL — SIGNIFICANT CHANGE UP (ref 0–0.5)
EOSINOPHIL NFR BLD AUTO: 0.7 % — SIGNIFICANT CHANGE UP (ref 0–6)
GLUCOSE BLDC GLUCOMTR-MCNC: 208 MG/DL — HIGH (ref 70–99)
GLUCOSE BLDC GLUCOMTR-MCNC: 209 MG/DL — HIGH (ref 70–99)
GLUCOSE BLDC GLUCOMTR-MCNC: 225 MG/DL — HIGH (ref 70–99)
GLUCOSE BLDC GLUCOMTR-MCNC: 240 MG/DL — HIGH (ref 70–99)
GLUCOSE SERPL-MCNC: 222 MG/DL — HIGH (ref 70–99)
HCT VFR BLD CALC: 44.9 % — SIGNIFICANT CHANGE UP (ref 34.5–45)
HGB BLD-MCNC: 12.6 G/DL — SIGNIFICANT CHANGE UP (ref 11.5–15.5)
IMM GRANULOCYTES NFR BLD AUTO: 2.3 % — HIGH (ref 0–1.5)
INR BLD: 1.31 RATIO — HIGH (ref 0.88–1.16)
INR BLD: 1.37 RATIO — HIGH (ref 0.88–1.16)
LYMPHOCYTES # BLD AUTO: 10.3 % — LOW (ref 13–44)
LYMPHOCYTES # BLD AUTO: 2.81 K/UL — SIGNIFICANT CHANGE UP (ref 1–3.3)
MCHC RBC-ENTMCNC: 24.1 PG — LOW (ref 27–34)
MCHC RBC-ENTMCNC: 28.1 GM/DL — LOW (ref 32–36)
MCV RBC AUTO: 86 FL — SIGNIFICANT CHANGE UP (ref 80–100)
MONOCYTES # BLD AUTO: 1.23 K/UL — HIGH (ref 0–0.9)
MONOCYTES NFR BLD AUTO: 4.5 % — SIGNIFICANT CHANGE UP (ref 2–14)
NEUTROPHILS # BLD AUTO: 22.2 K/UL — HIGH (ref 1.8–7.4)
NEUTROPHILS NFR BLD AUTO: 81.2 % — HIGH (ref 43–77)
NRBC # BLD: 0 /100 WBCS — SIGNIFICANT CHANGE UP (ref 0–0)
PLATELET # BLD AUTO: 246 K/UL — SIGNIFICANT CHANGE UP (ref 150–400)
POTASSIUM SERPL-MCNC: 4.2 MMOL/L — SIGNIFICANT CHANGE UP (ref 3.5–5.3)
POTASSIUM SERPL-SCNC: 4.2 MMOL/L — SIGNIFICANT CHANGE UP (ref 3.5–5.3)
PROTHROM AB SERPL-ACNC: 15 SEC — HIGH (ref 10–12.9)
PROTHROM AB SERPL-ACNC: 15.6 SEC — HIGH (ref 10–12.9)
RBC # BLD: 5.22 M/UL — HIGH (ref 3.8–5.2)
RBC # FLD: 18.2 % — HIGH (ref 10.3–14.5)
SODIUM SERPL-SCNC: 140 MMOL/L — SIGNIFICANT CHANGE UP (ref 135–145)
SPECIMEN SOURCE: SIGNIFICANT CHANGE UP
SPECIMEN SOURCE: SIGNIFICANT CHANGE UP
WBC # BLD: 27.34 K/UL — HIGH (ref 3.8–10.5)
WBC # FLD AUTO: 27.34 K/UL — HIGH (ref 3.8–10.5)

## 2019-07-15 PROCEDURE — 84484 ASSAY OF TROPONIN QUANT: CPT

## 2019-07-15 PROCEDURE — 93005 ELECTROCARDIOGRAM TRACING: CPT

## 2019-07-15 PROCEDURE — 84145 PROCALCITONIN (PCT): CPT

## 2019-07-15 PROCEDURE — 99239 HOSP IP/OBS DSCHRG MGMT >30: CPT | Mod: GC

## 2019-07-15 PROCEDURE — 99285 EMERGENCY DEPT VISIT HI MDM: CPT | Mod: 25

## 2019-07-15 PROCEDURE — 82248 BILIRUBIN DIRECT: CPT

## 2019-07-15 PROCEDURE — 80053 COMPREHEN METABOLIC PANEL: CPT

## 2019-07-15 PROCEDURE — 71045 X-RAY EXAM CHEST 1 VIEW: CPT

## 2019-07-15 PROCEDURE — 81001 URINALYSIS AUTO W/SCOPE: CPT

## 2019-07-15 PROCEDURE — 97161 PT EVAL LOW COMPLEX 20 MIN: CPT

## 2019-07-15 PROCEDURE — 84443 ASSAY THYROID STIM HORMONE: CPT

## 2019-07-15 PROCEDURE — 93460 R&L HRT ART/VENTRICLE ANGIO: CPT | Mod: 26,GC

## 2019-07-15 PROCEDURE — 83735 ASSAY OF MAGNESIUM: CPT

## 2019-07-15 PROCEDURE — 85027 COMPLETE CBC AUTOMATED: CPT

## 2019-07-15 PROCEDURE — 85610 PROTHROMBIN TIME: CPT

## 2019-07-15 PROCEDURE — 97116 GAIT TRAINING THERAPY: CPT

## 2019-07-15 PROCEDURE — 83690 ASSAY OF LIPASE: CPT

## 2019-07-15 PROCEDURE — 36415 COLL VENOUS BLD VENIPUNCTURE: CPT

## 2019-07-15 PROCEDURE — 94640 AIRWAY INHALATION TREATMENT: CPT

## 2019-07-15 PROCEDURE — 82553 CREATINE MB FRACTION: CPT

## 2019-07-15 PROCEDURE — 85730 THROMBOPLASTIN TIME PARTIAL: CPT

## 2019-07-15 PROCEDURE — 82962 GLUCOSE BLOOD TEST: CPT

## 2019-07-15 PROCEDURE — 99223 1ST HOSP IP/OBS HIGH 75: CPT | Mod: 25

## 2019-07-15 PROCEDURE — 82550 ASSAY OF CK (CPK): CPT

## 2019-07-15 PROCEDURE — 74177 CT ABD & PELVIS W/CONTRAST: CPT

## 2019-07-15 PROCEDURE — 87086 URINE CULTURE/COLONY COUNT: CPT

## 2019-07-15 PROCEDURE — 80048 BASIC METABOLIC PNL TOTAL CA: CPT

## 2019-07-15 PROCEDURE — 93010 ELECTROCARDIOGRAM REPORT: CPT

## 2019-07-15 PROCEDURE — 87040 BLOOD CULTURE FOR BACTERIA: CPT

## 2019-07-15 PROCEDURE — 83880 ASSAY OF NATRIURETIC PEPTIDE: CPT

## 2019-07-15 PROCEDURE — 80076 HEPATIC FUNCTION PANEL: CPT

## 2019-07-15 PROCEDURE — 97110 THERAPEUTIC EXERCISES: CPT

## 2019-07-15 PROCEDURE — 99232 SBSQ HOSP IP/OBS MODERATE 35: CPT

## 2019-07-15 PROCEDURE — 93306 TTE W/DOPPLER COMPLETE: CPT

## 2019-07-15 PROCEDURE — 83036 HEMOGLOBIN GLYCOSYLATED A1C: CPT

## 2019-07-15 PROCEDURE — 71260 CT THORAX DX C+: CPT

## 2019-07-15 PROCEDURE — 96365 THER/PROPH/DIAG IV INF INIT: CPT | Mod: XU

## 2019-07-15 PROCEDURE — 97530 THERAPEUTIC ACTIVITIES: CPT

## 2019-07-15 RX ORDER — ATORVASTATIN CALCIUM 80 MG/1
1 TABLET, FILM COATED ORAL
Qty: 0 | Refills: 0 | DISCHARGE
Start: 2019-07-15

## 2019-07-15 RX ORDER — SODIUM CHLORIDE 9 MG/ML
1000 INJECTION, SOLUTION INTRAVENOUS
Refills: 0 | Status: DISCONTINUED | OUTPATIENT
Start: 2019-07-15 | End: 2019-07-19

## 2019-07-15 RX ORDER — GLUCAGON INJECTION, SOLUTION 0.5 MG/.1ML
1 INJECTION, SOLUTION SUBCUTANEOUS ONCE
Refills: 0 | Status: DISCONTINUED | OUTPATIENT
Start: 2019-07-15 | End: 2019-07-19

## 2019-07-15 RX ORDER — DEXTROSE 50 % IN WATER 50 %
25 SYRINGE (ML) INTRAVENOUS ONCE
Refills: 0 | Status: DISCONTINUED | OUTPATIENT
Start: 2019-07-15 | End: 2019-07-19

## 2019-07-15 RX ORDER — HEPARIN SODIUM 5000 [USP'U]/ML
INJECTION INTRAVENOUS; SUBCUTANEOUS
Qty: 25000 | Refills: 0 | Status: DISCONTINUED | OUTPATIENT
Start: 2019-07-15 | End: 2019-07-16

## 2019-07-15 RX ORDER — FUROSEMIDE 40 MG
40 TABLET ORAL DAILY
Refills: 0 | Status: DISCONTINUED | OUTPATIENT
Start: 2019-07-15 | End: 2019-07-19

## 2019-07-15 RX ORDER — ATORVASTATIN CALCIUM 80 MG/1
40 TABLET, FILM COATED ORAL AT BEDTIME
Refills: 0 | Status: DISCONTINUED | OUTPATIENT
Start: 2019-07-15 | End: 2019-07-19

## 2019-07-15 RX ORDER — HEPARIN SODIUM 5000 [USP'U]/ML
1250 INJECTION INTRAVENOUS; SUBCUTANEOUS
Qty: 25000 | Refills: 0 | Status: DISCONTINUED | OUTPATIENT
Start: 2019-07-15 | End: 2019-07-15

## 2019-07-15 RX ORDER — POLYETHYLENE GLYCOL 3350 17 G/17G
1 POWDER, FOR SOLUTION ORAL
Qty: 0 | Refills: 0 | DISCHARGE

## 2019-07-15 RX ORDER — LISINOPRIL 2.5 MG/1
20 TABLET ORAL DAILY
Refills: 0 | Status: DISCONTINUED | OUTPATIENT
Start: 2019-07-15 | End: 2019-07-19

## 2019-07-15 RX ORDER — HYDROXYUREA 500 MG/1
1 CAPSULE ORAL
Qty: 0 | Refills: 0 | DISCHARGE
Start: 2019-07-15

## 2019-07-15 RX ORDER — FUROSEMIDE 40 MG
1 TABLET ORAL
Qty: 0 | Refills: 0 | DISCHARGE
Start: 2019-07-15

## 2019-07-15 RX ORDER — DEXTROSE 50 % IN WATER 50 %
15 SYRINGE (ML) INTRAVENOUS ONCE
Refills: 0 | Status: DISCONTINUED | OUTPATIENT
Start: 2019-07-15 | End: 2019-07-19

## 2019-07-15 RX ORDER — HYDROXYUREA 500 MG/1
500 CAPSULE ORAL DAILY
Refills: 0 | Status: DISCONTINUED | OUTPATIENT
Start: 2019-07-15 | End: 2019-07-19

## 2019-07-15 RX ORDER — DOCUSATE SODIUM 100 MG
1 CAPSULE ORAL
Qty: 0 | Refills: 0 | DISCHARGE

## 2019-07-15 RX ORDER — ATORVASTATIN CALCIUM 80 MG/1
1 TABLET, FILM COATED ORAL
Qty: 0 | Refills: 0 | DISCHARGE

## 2019-07-15 RX ORDER — ALBUTEROL 90 UG/1
0 AEROSOL, METERED ORAL
Qty: 0 | Refills: 0 | DISCHARGE
Start: 2019-07-15

## 2019-07-15 RX ORDER — HYDROXYUREA 500 MG/1
1 CAPSULE ORAL
Qty: 0 | Refills: 0 | DISCHARGE

## 2019-07-15 RX ORDER — WARFARIN SODIUM 2.5 MG/1
1 TABLET ORAL
Qty: 0 | Refills: 0 | DISCHARGE

## 2019-07-15 RX ORDER — METOPROLOL TARTRATE 50 MG
1 TABLET ORAL
Qty: 0 | Refills: 0 | DISCHARGE

## 2019-07-15 RX ORDER — CHOLECALCIFEROL (VITAMIN D3) 125 MCG
1000 CAPSULE ORAL DAILY
Refills: 0 | Status: DISCONTINUED | OUTPATIENT
Start: 2019-07-15 | End: 2019-07-19

## 2019-07-15 RX ORDER — METOPROLOL TARTRATE 50 MG
1 TABLET ORAL
Qty: 0 | Refills: 0 | DISCHARGE
Start: 2019-07-15

## 2019-07-15 RX ORDER — ASPIRIN/CALCIUM CARB/MAGNESIUM 324 MG
81 TABLET ORAL DAILY
Refills: 0 | Status: DISCONTINUED | OUTPATIENT
Start: 2019-07-15 | End: 2019-07-19

## 2019-07-15 RX ORDER — INSULIN LISPRO 100/ML
VIAL (ML) SUBCUTANEOUS
Refills: 0 | Status: DISCONTINUED | OUTPATIENT
Start: 2019-07-15 | End: 2019-07-19

## 2019-07-15 RX ORDER — DEXTROSE 50 % IN WATER 50 %
12.5 SYRINGE (ML) INTRAVENOUS ONCE
Refills: 0 | Status: DISCONTINUED | OUTPATIENT
Start: 2019-07-15 | End: 2019-07-19

## 2019-07-15 RX ORDER — METOPROLOL TARTRATE 50 MG
25 TABLET ORAL DAILY
Refills: 0 | Status: DISCONTINUED | OUTPATIENT
Start: 2019-07-15 | End: 2019-07-19

## 2019-07-15 RX ORDER — HEPARIN SODIUM 5000 [USP'U]/ML
4000 INJECTION INTRAVENOUS; SUBCUTANEOUS EVERY 6 HOURS
Refills: 0 | Status: DISCONTINUED | OUTPATIENT
Start: 2019-07-15 | End: 2019-07-16

## 2019-07-15 RX ORDER — HEPARIN SODIUM 5000 [USP'U]/ML
3200 INJECTION INTRAVENOUS; SUBCUTANEOUS EVERY 6 HOURS
Refills: 0 | Status: DISCONTINUED | OUTPATIENT
Start: 2019-07-15 | End: 2019-07-15

## 2019-07-15 RX ORDER — HEPARIN SODIUM 5000 [USP'U]/ML
2000 INJECTION INTRAVENOUS; SUBCUTANEOUS EVERY 6 HOURS
Refills: 0 | Status: DISCONTINUED | OUTPATIENT
Start: 2019-07-15 | End: 2019-07-16

## 2019-07-15 RX ORDER — PANTOPRAZOLE SODIUM 20 MG/1
40 TABLET, DELAYED RELEASE ORAL
Refills: 0 | Status: DISCONTINUED | OUTPATIENT
Start: 2019-07-15 | End: 2019-07-19

## 2019-07-15 RX ORDER — INSULIN LISPRO 100/ML
VIAL (ML) SUBCUTANEOUS AT BEDTIME
Refills: 0 | Status: DISCONTINUED | OUTPATIENT
Start: 2019-07-15 | End: 2019-07-19

## 2019-07-15 RX ADMIN — Medication 25 MILLIGRAM(S): at 05:52

## 2019-07-15 RX ADMIN — Medication 2: at 08:24

## 2019-07-15 RX ADMIN — Medication 1000 UNIT(S): at 15:53

## 2019-07-15 RX ADMIN — HEPARIN SODIUM 1000 UNIT(S)/HR: 5000 INJECTION INTRAVENOUS; SUBCUTANEOUS at 20:14

## 2019-07-15 RX ADMIN — LISINOPRIL 20 MILLIGRAM(S): 2.5 TABLET ORAL at 05:52

## 2019-07-15 RX ADMIN — ATORVASTATIN CALCIUM 40 MILLIGRAM(S): 80 TABLET, FILM COATED ORAL at 21:41

## 2019-07-15 RX ADMIN — Medication 2: at 15:53

## 2019-07-15 RX ADMIN — HYDROXYUREA 500 MILLIGRAM(S): 500 CAPSULE ORAL at 15:53

## 2019-07-15 RX ADMIN — PANTOPRAZOLE SODIUM 40 MILLIGRAM(S): 20 TABLET, DELAYED RELEASE ORAL at 05:52

## 2019-07-15 RX ADMIN — ALBUTEROL 2.5 MILLIGRAM(S): 90 AEROSOL, METERED ORAL at 07:27

## 2019-07-15 RX ADMIN — Medication 40 MILLIGRAM(S): at 05:52

## 2019-07-15 NOTE — H&P CARDIOLOGY - HISTORY OF PRESENT ILLNESS
This is a 89yo  F w/pmh of Afib on coumadin ( INR 1.37 today, Coumadin has been on hold for cath), Myeloproliferative disorder on hydroxyurea, HTN, HLD, CAD s/p prior MI (8 years ago) with a known chronically occluded RCA, moderate disease in LCX and LAD managed medically, normal LV and Type 2 DM ( A1c 7.7 on 7/10/19, well managed not on insulin), presenting to [facility] on 7/ 9/2019 with episode of dyspnea on exertion and abdominal pain this morning. States that she generally has shortness of breath with exertion but the abdominal discomfort was new today. Admits to productive cough. Difficult for her to describe abdominal pain but states that it was epigastric, non-radiating and felt as if she needed to go to the bathroom but could not. She has a history of constipation and admits to recent diarrhea but takes miralax and colace daily, had bowel movement that day.  Denies eating anything out of the ordinary. Also admits that she felt alone ( on 7/9 that morning) when these symptoms occurred. Denies headache, fevers/chills, n/v, chest pain, palpitations, diarrhea, dysuria, hematuria, edema or weakness. Denies recent use of steroids. Denies recent falls, sick contacts or recent travel. Of note, patient's symptoms improved upon arrival to ER. Unknown last colonoscopy.   -Unclear etiology of her dyspnea, as per cardio at [place] it could be her angina equivalent.  She did have a positive nuclear stress test that was elected to manage medically and has known residual CAD.  CE's x 2 sets are negative, Echo with normal LV function but signs of elevated filling pressures and large IVC, severe pulm HTN.  She has been euvolemic while in hospital (Lasix was switched to PO).  No evidence of acute ischemia per EKG ( Afib, HR 68, T wave inversion in V1).  Her renal status remains normal ( creat 1.20 ), and her INR is down to 1.37 today, transferred to [facility] for LHC/RHC today    ++ Of NOTE: patient has persistent leukocytosis but she is cleared by ID as it is idiopathic and not r/t infectious process   ++ Abdominal pain resolving w/ CT abdomen + diverticulosis otherwise negative for pathology ( without diverticulitis or evidence of acute inflammation. No bowel obstruction. Splenomegaly), Per GI at [place] Dr Mckinney, colonoscopy not indicated at this time; cont to monitor abdominal exam and Diet as tolerated.        < from: TTE Echo Doppler w/o Cont (07.11.19 @ 11:37) >  OBSERVATIONS:  Technically difficult study  Mitral Valve: Dense mitral annular calcification and calcified mitral   leaflets. Mild mitral regurgitation.  Aortic Valve/Aorta: Normal trileaflet aortic valve.  Tricuspid Valve: Normal tricuspid valve. Mild tricuspid regurgitation.  Pulmonic Valve: The pulmonic valve is not well visualized. Probably   normal.  Left Atrium: Severe enlargement  Right Atrium: Mild enlargement  Left Ventricle: Overall preserved leftventricular systolic function. The   ejection fraction is approximately 60%.  Right Ventricle: Normal right ventricular size and function.  Pericardium/Pleura: No pericardial effusion noted.  Pulmonary/RV Pressure: The right ventricular systolic pressure is   estimated to be 61mmHg, assuming that the right atrial pressure is   estimated to be 20 mmHg. This is consistent with pulmonary hypertension.   The IVC is extremely dilated (2.8cm) and appears noncollapsible. The   right atrial pressure is estimated to be 15 to 20 mmHg.  LV Diastolic Function: E/e' significant elevated consistent with   increased left ventricular filling pressures    Conclusion: Technically difficult study. Overall preserved left   ventricular systolic function. EF 60%. Elevated left ventricular filling   pressures. Severe pulmonary hypertension with severely elevated right   atrial pressures based on an IVC diameter of 2.8 cm that is   noncollapsible.    < end of copied text >      < from: CT Chest w/ IV Cont (07.09.19 @ 20:12) >  CHEST:     LUNGS AND LARGE AIRWAYS: Patent central airways. Geographic areas of mild   groundglass air space opacity, may reflect air trapping. 3 mm left lower   lobe pulmonary nodule (series 2, image 56). In the absence of known risk   factors, no further routine follow-up is recommended.  PLEURA: No pleural effusion.  VESSELS: Atherosclerosis of the thoracic aorta which is otherwise normal   in caliber.  HEART: Heart size is normal. No pericardialeffusion. Aortic valvular,   coronary artery, and mitral annular calcification.  MEDIASTINUM AND RASTA: Numerous small volume mediastinal and bilateral   hilar nodes.  CHEST WALL AND LOWER NECK: Within normal limits.    ABDOMEN AND PELVIS:    LIVER: Within normal limits.  BILE DUCTS: Normal caliber.  GALLBLADDER: Within normal limits.  SPLEEN: Splenomegaly measuring up to 16.0 cm in the AP dimension..  PANCREAS: Within normal limits.  ADRENALS: Within normal limits.  KIDNEYS/URETERS: Bilateral renal cysts and low-attenuation lesions too   small to accurately characterize. No hydronephrosis.    BLADDER: Within normal limits.  REPRODUCTIVE ORGANS: Uterus and adnexa within normal limits.    BOWEL: No bowel obstruction. Colonic diverticulosis without evidence of   acute diverticulitis. Appendix is normal.  PERITONEUM: No ascites.  VESSELS:  Atherosclerosis of the abdominal aorta and its branch vessels.  RETROPERITONEUM: No lymphadenopathy.    ABDOMINAL WALL: Within normal limits.  BONES: Status post open reduction internal fixation of the right hip.   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 Multilevel degenerative changes   of the spine.    IMPRESSION:     Nonspecific geographic groundglass airspace opacity, suggestive of air   trapping.    Numerous predominantly small volume mediastinal and bilateral hilar   nodes, of unclear etiology.    Colonic diverticulosis without diverticulitis or evidence of acute   inflammation. No bowel obstruction.    Splenomegaly.    < end of copied text >    < from: Xray Chest 1 View AP/PA (07.09.19 @ 18:28) >  FINDINGS:   Thoracic aortic atheromatous changes and ectasia are stable  The heart is magnified by technique -no significant cardiac enlargement   seen.  No florid central congestive changes.  No focal consolidation. No pleural effusion or pneumothorax  No acute bony findings.    IMPRESSION:   Stable chest.  No acute process.    < end of copied text > This is a 89yo  F, with no cardiac implantable device/s,  w/pmh of Afib on coumadin ( INR 1.37 today, Coumadin has been on hold for cath), Myeloproliferative disorder on hydroxyurea ( heme/onc Dr Burgess), HTN, HLD, CAD s/p prior MI (8 years ago) with a known chronically occluded RCA, moderate disease in LCX and LAD managed medically, normal LV and Type 2 DM ( A1c 7.7 on 7/10/19, well managed not on insulin), presenting to [facility] on 7/ 9/2019 with episode of dyspnea on exertion and abdominal pain this morning. States that she generally has shortness of breath with exertion but the abdominal discomfort was new today. Admits to productive cough. Difficult for her to describe abdominal pain but states that it was epigastric, non-radiating and felt as if she needed to go to the bathroom but could not. She has a history of constipation and admits to recent diarrhea but takes miralax and colace daily, had bowel movement that day.  Denies eating anything out of the ordinary. Also admits that she felt alone ( on 7/9 that morning) when these symptoms occurred. Denies headache, fevers/chills, n/v, chest pain, palpitations, diarrhea, dysuria, hematuria, edema or weakness. Denies recent use of steroids. Denies recent falls, sick contacts or recent travel. Of note, patient's symptoms improved upon arrival to ER. Unknown last colonoscopy.   -Unclear etiology of her dyspnea, as per cardio at [place] it could be her angina equivalent.  She did have a positive nuclear stress test that was elected to manage medically and has known residual CAD.  CE's x 2 sets are negative, Echo with normal LV function but signs of elevated filling pressures and large IVC, severe pulm HTN.  She has been euvolemic while in hospital (Lasix was switched to PO).  No evidence of acute ischemia per EKG ( Afib, HR 68, T wave inversion in V1).  Her renal status remains normal ( creat 1.20 ), and her INR is down to 1.37 today, transferred to [facility] for LHC/RHC today    ++ Of NOTE: patient has persistent leukocytosis but she is cleared by ID as it is idiopathic and not r/t infectious process   ++ Abdominal pain resolving w/ CT abdomen + diverticulosis otherwise negative for pathology ( without diverticulitis or evidence of acute inflammation. No bowel obstruction. Splenomegaly), Per GI at [place] Dr Mckinney, colonoscopy not indicated at this time; cont to monitor abdominal exam and Diet as tolerated.        < from: TTE Echo Doppler w/o Cont (07.11.19 @ 11:37) >  OBSERVATIONS:  Technically difficult study  Mitral Valve: Dense mitral annular calcification and calcified mitral   leaflets. Mild mitral regurgitation.  Aortic Valve/Aorta: Normal trileaflet aortic valve.  Tricuspid Valve: Normal tricuspid valve. Mild tricuspid regurgitation.  Pulmonic Valve: The pulmonic valve is not well visualized. Probably   normal.  Left Atrium: Severe enlargement  Right Atrium: Mild enlargement  Left Ventricle: Overall preserved leftventricular systolic function. The   ejection fraction is approximately 60%.  Right Ventricle: Normal right ventricular size and function.  Pericardium/Pleura: No pericardial effusion noted.  Pulmonary/RV Pressure: The right ventricular systolic pressure is   estimated to be 61mmHg, assuming that the right atrial pressure is   estimated to be 20 mmHg. This is consistent with pulmonary hypertension.   The IVC is extremely dilated (2.8cm) and appears noncollapsible. The   right atrial pressure is estimated to be 15 to 20 mmHg.  LV Diastolic Function: E/e' significant elevated consistent with   increased left ventricular filling pressures    Conclusion: Technically difficult study. Overall preserved left   ventricular systolic function. EF 60%. Elevated left ventricular filling   pressures. Severe pulmonary hypertension with severely elevated right   atrial pressures based on an IVC diameter of 2.8 cm that is   noncollapsible.    < end of copied text >      < from: CT Chest w/ IV Cont (07.09.19 @ 20:12) >  CHEST:     LUNGS AND LARGE AIRWAYS: Patent central airways. Geographic areas of mild   groundglass air space opacity, may reflect air trapping. 3 mm left lower   lobe pulmonary nodule (series 2, image 56). In the absence of known risk   factors, no further routine follow-up is recommended.  PLEURA: No pleural effusion.  VESSELS: Atherosclerosis of the thoracic aorta which is otherwise normal   in caliber.  HEART: Heart size is normal. No pericardialeffusion. Aortic valvular,   coronary artery, and mitral annular calcification.  MEDIASTINUM AND RASTA: Numerous small volume mediastinal and bilateral   hilar nodes.  CHEST WALL AND LOWER NECK: Within normal limits.    ABDOMEN AND PELVIS:    LIVER: Within normal limits.  BILE DUCTS: Normal caliber.  GALLBLADDER: Within normal limits.  SPLEEN: Splenomegaly measuring up to 16.0 cm in the AP dimension..  PANCREAS: Within normal limits.  ADRENALS: Within normal limits.  KIDNEYS/URETERS: Bilateral renal cysts and low-attenuation lesions too   small to accurately characterize. No hydronephrosis.    BLADDER: Within normal limits.  REPRODUCTIVE ORGANS: Uterus and adnexa within normal limits.    BOWEL: No bowel obstruction. Colonic diverticulosis without evidence of   acute diverticulitis. Appendix is normal.  PERITONEUM: No ascites.  VESSELS:  Atherosclerosis of the abdominal aorta and its branch vessels.  RETROPERITONEUM: No lymphadenopathy.    ABDOMINAL WALL: Within normal limits.  BONES: Status post open reduction internal fixation of the right hip.   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 Multilevel degenerative changes   of the spine.    IMPRESSION:     Nonspecific geographic groundglass airspace opacity, suggestive of air   trapping.    Numerous predominantly small volume mediastinal and bilateral hilar   nodes, of unclear etiology.    Colonic diverticulosis without diverticulitis or evidence of acute   inflammation. No bowel obstruction.    Splenomegaly.    < end of copied text >    < from: Xray Chest 1 View AP/PA (07.09.19 @ 18:28) >  FINDINGS:   Thoracic aortic atheromatous changes and ectasia are stable  The heart is magnified by technique -no significant cardiac enlargement   seen.  No florid central congestive changes.  No focal consolidation. No pleural effusion or pneumothorax  No acute bony findings.    IMPRESSION:   Stable chest.  No acute process.    < end of copied text > This is a 89yo  F, with no cardiac implantable device/s,  w/pmh of Afib on coumadin ( INR 1.37 today, Coumadin has been on hold for cath), Myeloproliferative disorder on hydroxyurea ( heme/onc Dr Burgess), HTN, HLD, CAD s/p prior MI (8 years ago) with a known chronically occluded RCA, moderate disease in LCX and LAD managed medically, normal LV and Type 2 DM ( A1c 7.7 on 7/10/19, well managed not on insulin), presenting to [facility] on 7/ 9/2019 with episode of dyspnea on exertion and abdominal pain this morning. States that she generally has shortness of breath with exertion but the abdominal discomfort was new today. Admits to productive cough. Difficult for her to describe abdominal pain but states that it was epigastric, non-radiating and felt as if she needed to go to the bathroom but could not. She has a history of constipation and admits to recent diarrhea but takes miralax and colace daily, had bowel movement that day.  Denies eating anything out of the ordinary. Also admits that she felt alone ( on 7/9 that morning) when these symptoms occurred. Denies headache, fevers/chills, n/v, chest pain, palpitations, diarrhea, dysuria, hematuria, edema or weakness. Denies recent use of steroids. Denies recent falls, sick contacts or recent travel. Of note, patient's symptoms improved upon arrival to ER. Unknown last colonoscopy.   -Unclear etiology of her dyspnea, as per cardio at [place] it could be her angina equivalent.  She did have a positive nuclear stress test that was elected to manage medically and has known residual CAD.  CE's x 2 sets are negative, Echo with normal LV function but signs of elevated filling pressures and large IVC, severe pulm HTN.  She has been euvolemic while in hospital (Lasix was switched to PO).  No evidence of acute ischemia per EKG ( Afib, HR 68, T wave inversion in V1).  Her renal status remains normal ( creat 1.20 ), and her INR is down to 1.37 today, transferred to [facility] for LHC/RHC today.  On arrival to [facility] pt is completely asymptomatic, denies: chest pain, dyspnea, dizziness, palpitations, N&V, HA, abd pain. Euvolemic on exam.       ++ Of NOTE: patient has persistent leukocytosis but she is cleared by ID as it is idiopathic and not r/t infectious process   ++ Abdominal pain resolved, w/ CT abdomen + diverticulosis otherwise negative for pathology ( without diverticulitis or evidence of acute inflammation. No bowel obstruction. Splenomegaly), Per GI at [place] Dr Mckinney, colonoscopy not indicated at this time; cont to monitor abdominal exam and Diet as tolerated.        < from: TTE Echo Doppler w/o Cont (07.11.19 @ 11:37) >  OBSERVATIONS:  Technically difficult study  Mitral Valve: Dense mitral annular calcification and calcified mitral   leaflets. Mild mitral regurgitation.  Aortic Valve/Aorta: Normal trileaflet aortic valve.  Tricuspid Valve: Normal tricuspid valve. Mild tricuspid regurgitation.  Pulmonic Valve: The pulmonic valve is not well visualized. Probably   normal.  Left Atrium: Severe enlargement  Right Atrium: Mild enlargement  Left Ventricle: Overall preserved leftventricular systolic function. The   ejection fraction is approximately 60%.  Right Ventricle: Normal right ventricular size and function.  Pericardium/Pleura: No pericardial effusion noted.  Pulmonary/RV Pressure: The right ventricular systolic pressure is   estimated to be 61mmHg, assuming that the right atrial pressure is   estimated to be 20 mmHg. This is consistent with pulmonary hypertension.   The IVC is extremely dilated (2.8cm) and appears noncollapsible. The   right atrial pressure is estimated to be 15 to 20 mmHg.  LV Diastolic Function: E/e' significant elevated consistent with   increased left ventricular filling pressures    Conclusion: Technically difficult study. Overall preserved left   ventricular systolic function. EF 60%. Elevated left ventricular filling   pressures. Severe pulmonary hypertension with severely elevated right   atrial pressures based on an IVC diameter of 2.8 cm that is   noncollapsible.    < end of copied text >      < from: CT Chest w/ IV Cont (07.09.19 @ 20:12) >  CHEST:     LUNGS AND LARGE AIRWAYS: Patent central airways. Geographic areas of mild   groundglass air space opacity, may reflect air trapping. 3 mm left lower   lobe pulmonary nodule (series 2, image 56). In the absence of known risk   factors, no further routine follow-up is recommended.  PLEURA: No pleural effusion.  VESSELS: Atherosclerosis of the thoracic aorta which is otherwise normal   in caliber.  HEART: Heart size is normal. No pericardialeffusion. Aortic valvular,   coronary artery, and mitral annular calcification.  MEDIASTINUM AND RASTA: Numerous small volume mediastinal and bilateral   hilar nodes.  CHEST WALL AND LOWER NECK: Within normal limits.    ABDOMEN AND PELVIS:    LIVER: Within normal limits.  BILE DUCTS: Normal caliber.  GALLBLADDER: Within normal limits.  SPLEEN: Splenomegaly measuring up to 16.0 cm in the AP dimension..  PANCREAS: Within normal limits.  ADRENALS: Within normal limits.  KIDNEYS/URETERS: Bilateral renal cysts and low-attenuation lesions too   small to accurately characterize. No hydronephrosis.    BLADDER: Within normal limits.  REPRODUCTIVE ORGANS: Uterus and adnexa within normal limits.    BOWEL: No bowel obstruction. Colonic diverticulosis without evidence of   acute diverticulitis. Appendix is normal.  PERITONEUM: No ascites.  VESSELS:  Atherosclerosis of the abdominal aorta and its branch vessels.  RETROPERITONEUM: No lymphadenopathy.    ABDOMINAL WALL: Within normal limits.  BONES: Status post open reduction internal fixation of the right hip.   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 Multilevel degenerative changes   of the spine.    IMPRESSION:     Nonspecific geographic groundglass airspace opacity, suggestive of air   trapping.    Numerous predominantly small volume mediastinal and bilateral hilar   nodes, of unclear etiology.    Colonic diverticulosis without diverticulitis or evidence of acute   inflammation. No bowel obstruction.    Splenomegaly.    < end of copied text >    < from: Xray Chest 1 View AP/PA (07.09.19 @ 18:28) >  FINDINGS:   Thoracic aortic atheromatous changes and ectasia are stable  The heart is magnified by technique -no significant cardiac enlargement   seen.  No florid central congestive changes.  No focal consolidation. No pleural effusion or pneumothorax  No acute bony findings.    IMPRESSION:   Stable chest.  No acute process.    < end of copied text > This is a 89yo  F, with no cardiac implantable device/s,  w/pmh of Afib on coumadin ( INR 1.37 today, Coumadin has been on hold for cath), Myeloproliferative disorder on hydroxyurea ( heme/onc Dr Burgess), HTN, HLD, CAD s/p prior MI (8 years ago) with a known chronically occluded RCA, moderate disease in LCX and LAD managed medically, normal LV and Type 2 DM ( A1c 7.7 on 7/10/19, well managed not on insulin), presenting to [facility] on 7/ 9/2019 with episode of dyspnea on exertion and abdominal pain that morning. States that she generally has shortness of breath with exertion but the abdominal discomfort was new today. Admits to productive cough. Difficult for her to describe abdominal pain but states that it was epigastric, non-radiating and felt as if she needed to go to the bathroom but could not. She has a history of constipation and admits to recent diarrhea but takes miralax and colace daily, had bowel movement that day.  Denies eating anything out of the ordinary. Also admits that she felt alone ( on 7/9 that morning) when these symptoms occurred. Denies headache, fevers/chills, n/v, chest pain, palpitations, diarrhea, dysuria, hematuria, edema or weakness. Denies recent use of steroids. Denies recent falls, sick contacts or recent travel. Of note, patient's symptoms improved upon arrival to ER. Unknown last colonoscopy.   -Unclear etiology of her dyspnea, as per cardio at [place] it could be her angina equivalent.  She did have a positive nuclear stress test that was elected to manage medically ( Dr Coley) and has known residual CAD.  CE's x 2 sets are negative, Echo with normal LV function but signs of elevated filling pressures and large IVC, severe pulm HTN.  She has been euvolemic while in hospital (Lasix was switched to PO).  No evidence of acute ischemia per EKG ( Afib, HR 68, T wave inversion in V1).  Her renal status remains normal ( creat 1.20 ), and her INR is down to 1.37 today, transferred to [facility] for LHC/RHC today.  On arrival to [facility] pt is completely asymptomatic, denies: chest pain, dyspnea, dizziness, palpitations, N&V, HA, abd pain. Euvolemic on exam.       ++ Of NOTE: patient has persistent leukocytosis but she is cleared by ID as it is idiopathic and not r/t infectious process   ++ Abdominal pain resolved, w/ CT abdomen + diverticulosis otherwise negative for pathology ( without diverticulitis or evidence of acute inflammation. No bowel obstruction. Splenomegaly), Per GI at [place] Dr Mckinney, colonoscopy not indicated at this time; cont to monitor abdominal exam and Diet as tolerated.        < from: TTE Echo Doppler w/o Cont (07.11.19 @ 11:37) >  OBSERVATIONS:  Technically difficult study  Mitral Valve: Dense mitral annular calcification and calcified mitral   leaflets. Mild mitral regurgitation.  Aortic Valve/Aorta: Normal trileaflet aortic valve.  Tricuspid Valve: Normal tricuspid valve. Mild tricuspid regurgitation.  Pulmonic Valve: The pulmonic valve is not well visualized. Probably   normal.  Left Atrium: Severe enlargement  Right Atrium: Mild enlargement  Left Ventricle: Overall preserved leftventricular systolic function. The   ejection fraction is approximately 60%.  Right Ventricle: Normal right ventricular size and function.  Pericardium/Pleura: No pericardial effusion noted.  Pulmonary/RV Pressure: The right ventricular systolic pressure is   estimated to be 61mmHg, assuming that the right atrial pressure is   estimated to be 20 mmHg. This is consistent with pulmonary hypertension.   The IVC is extremely dilated (2.8cm) and appears noncollapsible. The   right atrial pressure is estimated to be 15 to 20 mmHg.  LV Diastolic Function: E/e' significant elevated consistent with   increased left ventricular filling pressures    Conclusion: Technically difficult study. Overall preserved left   ventricular systolic function. EF 60%. Elevated left ventricular filling   pressures. Severe pulmonary hypertension with severely elevated right   atrial pressures based on an IVC diameter of 2.8 cm that is   noncollapsible.    < end of copied text >      < from: CT Chest w/ IV Cont (07.09.19 @ 20:12) >  CHEST:     LUNGS AND LARGE AIRWAYS: Patent central airways. Geographic areas of mild   groundglass air space opacity, may reflect air trapping. 3 mm left lower   lobe pulmonary nodule (series 2, image 56). In the absence of known risk   factors, no further routine follow-up is recommended.  PLEURA: No pleural effusion.  VESSELS: Atherosclerosis of the thoracic aorta which is otherwise normal   in caliber.  HEART: Heart size is normal. No pericardialeffusion. Aortic valvular,   coronary artery, and mitral annular calcification.  MEDIASTINUM AND RASTA: Numerous small volume mediastinal and bilateral   hilar nodes.  CHEST WALL AND LOWER NECK: Within normal limits.    ABDOMEN AND PELVIS:    LIVER: Within normal limits.  BILE DUCTS: Normal caliber.  GALLBLADDER: Within normal limits.  SPLEEN: Splenomegaly measuring up to 16.0 cm in the AP dimension..  PANCREAS: Within normal limits.  ADRENALS: Within normal limits.  KIDNEYS/URETERS: Bilateral renal cysts and low-attenuation lesions too   small to accurately characterize. No hydronephrosis.    BLADDER: Within normal limits.  REPRODUCTIVE ORGANS: Uterus and adnexa within normal limits.    BOWEL: No bowel obstruction. Colonic diverticulosis without evidence of   acute diverticulitis. Appendix is normal.  PERITONEUM: No ascites.  VESSELS:  Atherosclerosis of the abdominal aorta and its branch vessels.  RETROPERITONEUM: No lymphadenopathy.    ABDOMINAL WALL: Within normal limits.  BONES: Status post open reduction internal fixation of the right hip.   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 Multilevel degenerative changes   of the spine.    IMPRESSION:     Nonspecific geographic groundglass airspace opacity, suggestive of air   trapping.    Numerous predominantly small volume mediastinal and bilateral hilar   nodes, of unclear etiology.    Colonic diverticulosis without diverticulitis or evidence of acute   inflammation. No bowel obstruction.    Splenomegaly.    < end of copied text >    < from: Xray Chest 1 View AP/PA (07.09.19 @ 18:28) >  FINDINGS:   Thoracic aortic atheromatous changes and ectasia are stable  The heart is magnified by technique -no significant cardiac enlargement   seen.  No florid central congestive changes.  No focal consolidation. No pleural effusion or pneumothorax  No acute bony findings.    IMPRESSION:   Stable chest.  No acute process.    < end of copied text >

## 2019-07-15 NOTE — CHART NOTE - NSCHARTNOTEFT_GEN_A_CORE
Admit- patient underwent a PCI procedure and is being admitted due to high risk characteristics and is considered to be at an increased risk of major adverse cardiovascular events if discharged at this time     Unstable angina   possible staged PCI, Dr Siddiqui to speak to Dr Jones for plan/decision

## 2019-07-15 NOTE — PROGRESS NOTE ADULT - PROBLEM SELECTOR PLAN 1
on PO LASIX now  WBC - likely due to MPD  ID eval noted  Cardio follow up noted  on Admission - CT with contrast noted - although its not a CTA PE protocol - doubt pt has PE -   VS noted - on room air - Sat is 98 pct  out of bed  assess exertional sat  may need Cardiac Cath ?

## 2019-07-15 NOTE — DISCHARGE NOTE PROVIDER - CARE PROVIDER_API CALL
Trini Sena)  Internal Medicine  [address] [place] [address]  Phone: [phone]  Fax: [phone]  Follow Up Time:     James Coley)  Internal Medicine  [address] [place] [address]  Phone: [phone]  Fax: [phone]  Follow Up Time:

## 2019-07-15 NOTE — PATIENT PROFILE ADULT - NSPROHMDIABETMGMTSTRAT_GEN_A_NUR
blood glucose testing/adequate rest/medication therapy/activity/diet modification/routine screenings

## 2019-07-15 NOTE — PROGRESS NOTE ADULT - PROVIDER SPECIALTY LIST ADULT
Cardiology
Gastroenterology
Hospitalist
Pulmonology
Gastroenterology

## 2019-07-15 NOTE — DISCHARGE NOTE PROVIDER - CARE PROVIDERS DIRECT ADDRESSES
[email or web address]

## 2019-07-15 NOTE — PROGRESS NOTE ADULT - PROBLEM SELECTOR PROBLEM 1
Abdominal pain
Dyspnea
Dyspnea on exertion
Abdominal pain
Dyspnea on exertion

## 2019-07-15 NOTE — DISCHARGE NOTE PROVIDER - NSDCFUADDINST_GEN_ALL_CORE_FT
- Please make an appointment for follow up with your primary doctor in 1-3 days  - You are responsible for making your own appointments  - Please return to the ED if you develop worsening symptoms or fever

## 2019-07-15 NOTE — PROGRESS NOTE ADULT - REASON FOR ADMISSION
sob/abdominal pain

## 2019-07-15 NOTE — PATIENT PROFILE ADULT - NSPROIMPLANTSMEDDEV_GEN_A_NUR
Vascular stents/Clips/hx right hip replacement and hx of cataract surgery/Artificial joint/Lens implant/Urinary sphincter

## 2019-07-15 NOTE — DISCHARGE NOTE PROVIDER - HOSPITAL COURSE
FROM ADMISSION H+P:     HPI:    89F w/pmh of Afib on coumadin, Myeloproliferative disorder on hydroxyurea, HTN, HLD, CAD s/p prior MI (8 years ago) with a known chronically occluded RCA, moderate disease in LCX and LAD managed medically, normal LV and Type 2 DM not on insulin presenting with episode of dyspnea on exertion and abdominal pain this morning. States that she generally has shortness of breath with exertion but the abdominal discomfort was new today. Admits to productive cough. Difficult for her to describe abdominal pain but states that it was epigastric, non-radiating and felt as if she needed to go to the bathroom but could not. She has a history of constipation and admits to recent diarrhea but takes miralax and colace daily. Last bowel movement this morning. Denies eating anything out of the ordinary. Also admits that she felt alone this morning when these symptoms occurred. Denies headache, fevers/chills, n/v, chest pain, palpitations, diarrhea, dysuria, hematuria, edema or weakness. Denies recent use of steroids. Denies recent falls, sick contacts or recent travel. Of note, patient's symptoms improved upon arrival to ER. Unknown last colonoscopy.         In the ED, patient's vitals were: T97.9F, HR 76, /73, RR 26 and SpO2 99% on room air. Significant labs include: WBC 21.64, INR 2.25, Tbili 1.8, alk phos 189, trops neg x1, proBNP 2567.     CT A/P: Nonspecific geographic groundglass airspace opacity, suggestive of air     trapping. Numerous predominantly small volume mediastinal and bilateral hilar     nodes, of unclear etiology. Colonic diverticulosis without diverticulitis or evidence of acute inflammation. No bowel obstruction. Splenomegaly.    CXR: stable chest    Pt given Zosyn x1.     EKG: Afib, HR 68, T wave inversion in V1        U/A shows +nitrite (09 Jul 2019 22:09)            ---    HOSPITAL COURSE: Patient admitted with abdominal pain from possible diverticulitis, found to have leukocytosis. Also with sob, r/o ischemic heart disease.  Pt with known occluded RCA , moderate disease in LCX and LAD with positive nuclear stress test. GI, Dr. Mckinney, evaluated patient for abd pain, continued on IV Zosyn. Evaluated by cardio, Dr. Macias, pro-BNP mildly elevated but patient appeared euvolemic. Symptoms could be an anginal equivalent, cardiac enzymes negative x3. CT chest not reflective of volume overload/pleural effusion, however, groundglass opacity noted. Dr. Sheikh, pulm, evaluated patient recommended repeat CT chest in 8 - 12 weeks. Echo with LV function of 60% but signs of elevated filling pressures and large IVC. Started on IV Lasix 40mg transitioned to oral Lasix with improvement in symptoms. Coumadin was held for transfer to [place] for cath. ID, Dr. Sánchez, evaluated patient for persistent leukocytosis. Zosyn discontinued, suspected noninfectious in nature, most likely related to underlying myeloproliferative process. Blood cultures x2 negative, UCx negative. On day of transfer to [place] for cath, patient was stable, Cr at  1.2, INR at 1.37.              ---    CONSULTANTS:  Dr. Macias, cardio    Dr. Sheikh, jacqui Mckinney, GI    Dr. Sánchez, ID FROM ADMISSION H+P:     HPI:    89F w/pmh of Afib on coumadin, Myeloproliferative disorder on hydroxyurea, HTN, HLD, CAD s/p prior MI (8 years ago) with a known chronically occluded RCA, moderate disease in LCX and LAD managed medically, normal LV and Type 2 DM not on insulin presenting with episode of dyspnea on exertion and abdominal pain this morning. States that she generally has shortness of breath with exertion but the abdominal discomfort was new today. Admits to productive cough. Difficult for her to describe abdominal pain but states that it was epigastric, non-radiating and felt as if she needed to go to the bathroom but could not. She has a history of constipation and admits to recent diarrhea but takes miralax and colace daily. Last bowel movement this morning. Denies eating anything out of the ordinary. Also admits that she felt alone this morning when these symptoms occurred. Denies headache, fevers/chills, n/v, chest pain, palpitations, diarrhea, dysuria, hematuria, edema or weakness. Denies recent use of steroids. Denies recent falls, sick contacts or recent travel. Of note, patient's symptoms improved upon arrival to ER. Unknown last colonoscopy.         In the ED, patient's vitals were: T97.9F, HR 76, /73, RR 26 and SpO2 99% on room air. Significant labs include: WBC 21.64, INR 2.25, Tbili 1.8, alk phos 189, trops neg x1, proBNP 2567.     CT A/P: Nonspecific geographic groundglass airspace opacity, suggestive of air     trapping. Numerous predominantly small volume mediastinal and bilateral hilar     nodes, of unclear etiology. Colonic diverticulosis without diverticulitis or evidence of acute inflammation. No bowel obstruction. Splenomegaly.    CXR: stable chest    Pt given Zosyn x1.     EKG: Afib, HR 68, T wave inversion in V1        U/A shows +nitrite (09 Jul 2019 22:09)        Physical Exam:    General: Well developed, well nourished, NAD    Neurology: A&Ox3    Respiratory: CTA B/L, No W/R/R    CV: RRR, +S1/S2, no murmurs, rubs or gallops    Abdominal: Soft, NT, ND +BSx4, no palpable masses    Extremities: No C/C/E, + peripheral pulses    Skin: warm, dry, normal color        ---    HOSPITAL COURSE: Patient admitted with abdominal pain from possible diverticulitis, found to have leukocytosis. Also with sob, r/o ischemic heart disease.  Pt with known occluded RCA , moderate disease in LCX and LAD with positive nuclear stress test. GI, Dr. Mckinney, evaluated patient for abd pain, continued on IV Zosyn. Evaluated by cardio, Dr. Macias, pro-BNP mildly elevated but patient appeared euvolemic. Symptoms could be an anginal equivalent, cardiac enzymes negative x3. CT chest not reflective of volume overload/pleural effusion, however, groundglass opacity noted. Dr. Sheikh pulkeely, evaluated patient recommended repeat CT chest in 8 - 12 weeks. Echo with LV function of 60% but signs of elevated filling pressures and large IVC. Started on IV Lasix 40mg transitioned to oral Lasix with improvement in symptoms. Coumadin was held for transfer to [place] for cath. ID, Dr. Sánchez, evaluated patient for persistent leukocytosis. Zosyn discontinued, suspected noninfectious in nature, most likely related to underlying myeloproliferative process. Blood cultures x2 negative, UCx negative. On day of transfer to [place] for cath, patient was stable, Cr at  1.2, INR at 1.37.              ---    CONSULTANTS:  Dr. Macias, cardio    jacqui Matute Dr., GI    Dr. Sánchez, ID

## 2019-07-15 NOTE — H&P CARDIOLOGY - FAMILY HISTORY
Family history of cancer, mother - pancreatic ca     Family history of heart failure, father     Sibling  Still living? No  Family history of cancer, Age at diagnosis: Age Unknown

## 2019-07-15 NOTE — DISCHARGE NOTE PROVIDER - NSDCCPCAREPLAN_GEN_ALL_CORE_FT
PRINCIPAL DISCHARGE DIAGNOSIS  Diagnosis: Shortness of breath  Assessment and Plan of Treatment: You symptoms of shortness of breath were likely due to a component of volume overload and your positive nuclear stress test. You are being transferred to [place] for a cardiac cath. Your Coumadin was held in anticipation of the cath.  You were started on Lasix for potential volume overload, follow up with your cardiologist within the week to have your creatinine checked and need for continued medication.      SECONDARY DISCHARGE DIAGNOSES  Diagnosis: Abnormal chest CT  Assessment and Plan of Treatment: Your CT chest showed a nonspecific geographic groundglass airspace opacity. Have an outpatient repeat CT chest performed in 8-12 weeks. Follow up with your primary care doctor within the week.

## 2019-07-15 NOTE — PROGRESS NOTE ADULT - ASSESSMENT
This is a 89 year old woman with a history of CAD s/p prior MI with a known chronically occluded RCA, moderate disease in the LCX and LAD that has been managed medically, normal LV function, hypertension, AF on AC with Coumadin, DM who has been having increased dyspnea and presents to the ED with respiratory distress and abdominal pain.    MUNOZ  - Unclear etiology of her dyspnea could be her angina equivalent..    - She did have a positive nuclear stress test that we elected to manage medically and has known residual CAD  - Her CE's x 2 sets are negative  No further need to trend  - Echo with normal LV function but signs of elevated filling pressures and large IVC.   - She has been euvolemic.  Lasix was switched to PO.      CAD  - No evidence of acute ischemia per EKG  - Continue ASA, BB, ACEI, and statin  - Her renal status remains normal and her INR is down to 1.37.  Will call for transfer to [place] for LHC/RHC today  Discussed with patient and son.  She has persistent leukocytosis but she is cleared by ID    Atrial fibrillation  - Her rate remains controlled  - Continue to hold Coumadin in anticipation of her RH and LHC   - Continue Toprol 25 QD  - Monitor and replete electrolytes. Keep K>4.0 and Mg>2.0.     HTN  - BP remains stable at systolic 110-120  - Continue BB and ACEI    VTE ppx  - Per Primary    To follow closely with you    Brinda Thompson [facility]  Cardiology   Spectra #[phone]

## 2019-07-15 NOTE — PROGRESS NOTE ADULT - SUBJECTIVE AND OBJECTIVE BOX
Date/Time Patient Seen:  		  Referring MD:   Data Reviewed	       Patient is a 89y old  Female who presents with a chief complaint of sob/abdominal pain (14 Jul 2019 16:33)      Subjective/HPI     PAST MEDICAL & SURGICAL HISTORY:  Myeloproliferative disease  Anal fissure  Elevated red blood cell count  Atrial fibrillation: on coumadin  Skin cancer: of back - removed  Type 2 diabetes mellitus: not on insulin  Hypercholesterolemia  HTN (hypertension)  Myocardial infarction: 8 yrs ago  Hearing loss  S/P ORIF (open reduction internal fixation) fracture: right hip pinning  - 5 yrs ago  History of cataract surgery: 03/2016,  06/2016        Medication list         MEDICATIONS  (STANDING):  ALBUTerol    0.083% 2.5 milliGRAM(s) Nebulizer every 12 hours  aspirin enteric coated 81 milliGRAM(s) Oral daily  atorvastatin 40 milliGRAM(s) Oral at bedtime  cholecalciferol 1000 Unit(s) Oral daily  dextrose 5%. 1000 milliLiter(s) (50 mL/Hr) IV Continuous <Continuous>  dextrose 50% Injectable 12.5 Gram(s) IV Push once  dextrose 50% Injectable 25 Gram(s) IV Push once  dextrose 50% Injectable 25 Gram(s) IV Push once  furosemide    Tablet 40 milliGRAM(s) Oral daily  hydroxyurea 500 milliGRAM(s) Oral daily  insulin lispro (HumaLOG) corrective regimen sliding scale   SubCutaneous three times a day before meals  insulin lispro (HumaLOG) corrective regimen sliding scale   SubCutaneous at bedtime  lisinopril 20 milliGRAM(s) Oral daily  metoprolol succinate ER 25 milliGRAM(s) Oral daily  pantoprazole    Tablet 40 milliGRAM(s) Oral before breakfast    MEDICATIONS  (PRN):  dextrose 40% Gel 15 Gram(s) Oral once PRN Blood Glucose LESS THAN 70 milliGRAM(s)/deciliter  glucagon  Injectable 1 milliGRAM(s) IntraMuscular once PRN Glucose LESS THAN 70 milligrams/deciliter         Vitals log        ICU Vital Signs Last 24 Hrs  T(C): 36.4 (15 Jul 2019 05:28), Max: 36.6 (14 Jul 2019 23:10)  T(F): 97.5 (15 Jul 2019 05:28), Max: 97.9 (14 Jul 2019 23:10)  HR: 72 (15 Jul 2019 05:28) (66 - 82)  BP: 113/61 (15 Jul 2019 05:28) (113/61 - 127/78)  BP(mean): --  ABP: --  ABP(mean): --  RR: 20 (15 Jul 2019 05:28) (20 - 26)  SpO2: 98% (15 Jul 2019 05:28) (96% - 99%)           Input and Output:  I&O's Detail      Lab Data                        12.6   27.34 )-----------( 246      ( 15 Jul 2019 06:06 )             44.9     07-15    140  |  103  |  40<H>  ----------------------------<  222<H>  4.2   |  28  |  1.20    Ca    9.0      15 Jul 2019 06:06    TPro  6.7  /  Alb  3.7  /  TBili  1.1  /  DBili  x   /  AST  10<L>  /  ALT  17  /  AlkPhos  177<H>  07-14            Review of Systems	      Objective     Physical Examination    heart s1s2  lung dec BS  abd soft      Pertinent Lab findings & Imaging      Shira:  NO   Adequate UO     I&O's Detail           Discussed with:     Cultures:	        Radiology

## 2019-07-15 NOTE — H&P CARDIOLOGY - PMH
Anal fissure    Atrial fibrillation  on coumadin  Elevated red blood cell count    Hearing loss    HTN (hypertension)    Hypercholesterolemia    Myeloproliferative disease    Myocardial infarction  8 yrs ago  Skin cancer  of back - removed  Type 2 diabetes mellitus  not on insulin

## 2019-07-15 NOTE — DISCHARGE NOTE NURSING/CASE MANAGEMENT/SOCIAL WORK - NSDCDPATPORTLINK_GEN_ALL_CORE
You can access the [facility], offered by [facility], by registering with the following website: [email or web address]

## 2019-07-15 NOTE — PROGRESS NOTE ADULT - ATTENDING COMMENTS
Chart reviewed    Patient seen and examined    Agree with plan as outlined above
I saw and examined the patient personally. Spoke with above provider regarding this case. I reviewed the above findings completely.  I agree with the above history, physical, and plan which I have edited where appropriate.
The patient was personally seen and examined, in addition to being examined and evaluated by NP.  All elements of the note were edited where appropriate.
The patient was personally seen and examined, in addition to being examined and evaluated by NP.  All elements of the note were edited where appropriate.    extensive conversation with the patient, family at bedside and with dr sellers  most likely severe cad  remains unclear if this relates to this hospitalization  is most prudent to keep her hospitalized until inr falls, so that cath can be performed this admission  hold levi LEMUS spent 40 minutes,  with the patient at the bedside, with >50% of time for counseling and coordinating care.  We discussed at length the clinical condition and catheterization
I saw and examined the patient personally. Spoke with above provider regarding this case. I reviewed the above findings completely.  I agree with the above history, physical, and plan which I have edited where appropriate.  Despite neg cxr and ct chest would try diuresis given echo findings. lasix 40mg IV x 1
I saw and examined the patient personally. Spoke with above provider regarding this case. I reviewed the above findings completely.  I agree with the above history, physical, and plan which I have edited where appropriate.  I still think vol ol  start lasix 40mg IV Qday  Please continue to maintain strict I/Os, monitor daily weights, Cr, and K.
Advanced care planning was discussed with patient and family.  Advanced care planning forms were reviewed and discussed.  Risks, benefits and alternatives of gastroenterologic procedures were discussed in detail and all questions were answered.    30 minutes spent.
Advanced care planning was discussed with patient and family.  Advanced care planning forms were reviewed and discussed.  Risks, benefits and alternatives of gastroenterologic procedures were discussed in detail and all questions were answered.    30 minutes spent.
cath tomorrow [facility], [facility] cardio collaboration in coordination
89F w/pmh of Afib on coumadin, Myeloproliferative disorder on hydroxyurea, HTN, HLD, CAD s/p prior MI (8 years ago) with a known chronically occluded RCA, moderate disease in LCX and LAD managed medically, normal LV and Type 2 DM not on insulin presenting with episode of worsening dyspnea on exertion sec to worsening CAD requriing cath and resolving abdominal pain  diverticulosis. Plan: cont monitor on tele, trend inr, cath likely monday, apprec cardio recs and collaboration, monitor clinical course, apprec ID recs, descalate antibx
89F w/pmh of Afib on coumadin, Myeloproliferative disorder on hydroxyurea, HTN, HLD, CAD s/p prior MI (8 years ago) with a known chronically occluded RCA, moderate disease in LCX and LAD managed medically, normal LV and Type 2 DM not on insulin presenting with episode of dyspnea on exertion and abdominal pain admitted with diverticulosis, sirs and worsening dyspnea on exertion sec to suspect CHF Plan: f/u echo, cont empiric antibx, clinically improving, poss dc tomorrow if stable, apprec pulm and cardio recs
89F w/pmh of Afib on coumadin, Myeloproliferative disorder on hydroxyurea, HTN, HLD, CAD s/p prior MI (8 years ago) with a known chronically occluded RCA, moderate disease in LCX and LAD managed medically, normal LV and Type 2 DM not on insulin presenting with episode of worsening dyspnea on exertion sec to worsening CAD requiring cath and resolving abdominal pain from acute diverticulosis. Plan: symptomatic management, will need cath likely monday 7/15 for suspected worsening CAD, apprec cardio collaboration and recs, monitor on tele, prognosis guarded, descalate antibx at this time, apprec ID and pulm collaboration as well

## 2019-07-16 DIAGNOSIS — I48.91 UNSPECIFIED ATRIAL FIBRILLATION: ICD-10-CM

## 2019-07-16 DIAGNOSIS — E78.00 PURE HYPERCHOLESTEROLEMIA, UNSPECIFIED: ICD-10-CM

## 2019-07-16 DIAGNOSIS — I25.118 ATHEROSCLEROTIC HEART DISEASE OF NATIVE CORONARY ARTERY WITH OTHER FORMS OF ANGINA PECTORIS: ICD-10-CM

## 2019-07-16 DIAGNOSIS — I10 ESSENTIAL (PRIMARY) HYPERTENSION: ICD-10-CM

## 2019-07-16 LAB
ALBUMIN SERPL ELPH-MCNC: 4.1 G/DL — SIGNIFICANT CHANGE UP (ref 3.3–5)
ALP SERPL-CCNC: 150 U/L — HIGH (ref 40–120)
ALT FLD-CCNC: 10 U/L — SIGNIFICANT CHANGE UP (ref 10–45)
ANION GAP SERPL CALC-SCNC: 12 MMOL/L — SIGNIFICANT CHANGE UP (ref 5–17)
APTT BLD: 155.3 SEC — CRITICAL HIGH (ref 27.5–36.3)
AST SERPL-CCNC: 13 U/L — SIGNIFICANT CHANGE UP (ref 10–40)
BILIRUB SERPL-MCNC: 0.9 MG/DL — SIGNIFICANT CHANGE UP (ref 0.2–1.2)
BUN SERPL-MCNC: 47 MG/DL — HIGH (ref 7–23)
CALCIUM SERPL-MCNC: 9.6 MG/DL — SIGNIFICANT CHANGE UP (ref 8.4–10.5)
CHLORIDE SERPL-SCNC: 100 MMOL/L — SIGNIFICANT CHANGE UP (ref 96–108)
CO2 SERPL-SCNC: 24 MMOL/L — SIGNIFICANT CHANGE UP (ref 22–31)
CREAT SERPL-MCNC: 1.33 MG/DL — HIGH (ref 0.5–1.3)
GLUCOSE BLDC GLUCOMTR-MCNC: 143 MG/DL — HIGH (ref 70–99)
GLUCOSE BLDC GLUCOMTR-MCNC: 199 MG/DL — HIGH (ref 70–99)
GLUCOSE BLDC GLUCOMTR-MCNC: 211 MG/DL — HIGH (ref 70–99)
GLUCOSE BLDC GLUCOMTR-MCNC: 280 MG/DL — HIGH (ref 70–99)
GLUCOSE SERPL-MCNC: 211 MG/DL — HIGH (ref 70–99)
HCT VFR BLD CALC: 45.2 % — HIGH (ref 34.5–45)
HGB BLD-MCNC: 14.1 G/DL — SIGNIFICANT CHANGE UP (ref 11.5–15.5)
MCHC RBC-ENTMCNC: 25.8 PG — LOW (ref 27–34)
MCHC RBC-ENTMCNC: 31.3 GM/DL — LOW (ref 32–36)
MCV RBC AUTO: 82.5 FL — SIGNIFICANT CHANGE UP (ref 80–100)
PLATELET # BLD AUTO: 229 K/UL — SIGNIFICANT CHANGE UP (ref 150–400)
POTASSIUM SERPL-MCNC: 4.2 MMOL/L — SIGNIFICANT CHANGE UP (ref 3.5–5.3)
POTASSIUM SERPL-SCNC: 4.2 MMOL/L — SIGNIFICANT CHANGE UP (ref 3.5–5.3)
PROT SERPL-MCNC: 6.1 G/DL — SIGNIFICANT CHANGE UP (ref 6–8.3)
RBC # BLD: 5.48 M/UL — HIGH (ref 3.8–5.2)
RBC # FLD: 19 % — HIGH (ref 10.3–14.5)
SODIUM SERPL-SCNC: 136 MMOL/L — SIGNIFICANT CHANGE UP (ref 135–145)
WBC # BLD: 33.4 K/UL — HIGH (ref 3.8–10.5)
WBC # FLD AUTO: 33.4 K/UL — HIGH (ref 3.8–10.5)

## 2019-07-16 PROCEDURE — 93010 ELECTROCARDIOGRAM REPORT: CPT

## 2019-07-16 PROCEDURE — 99152 MOD SED SAME PHYS/QHP 5/>YRS: CPT | Mod: GC

## 2019-07-16 PROCEDURE — 99223 1ST HOSP IP/OBS HIGH 75: CPT

## 2019-07-16 PROCEDURE — 92928 PRQ TCAT PLMT NTRAC ST 1 LES: CPT | Mod: LD,GC

## 2019-07-16 PROCEDURE — 99226: CPT

## 2019-07-16 RX ORDER — SODIUM CHLORIDE 9 MG/ML
1000 INJECTION INTRAMUSCULAR; INTRAVENOUS; SUBCUTANEOUS
Refills: 0 | Status: DISCONTINUED | OUTPATIENT
Start: 2019-07-16 | End: 2019-07-19

## 2019-07-16 RX ORDER — HEPARIN SODIUM 5000 [USP'U]/ML
2000 INJECTION INTRAVENOUS; SUBCUTANEOUS EVERY 6 HOURS
Refills: 0 | Status: DISCONTINUED | OUTPATIENT
Start: 2019-07-16 | End: 2019-07-19

## 2019-07-16 RX ORDER — HEPARIN SODIUM 5000 [USP'U]/ML
4000 INJECTION INTRAVENOUS; SUBCUTANEOUS EVERY 6 HOURS
Refills: 0 | Status: DISCONTINUED | OUTPATIENT
Start: 2019-07-16 | End: 2019-07-19

## 2019-07-16 RX ORDER — HEPARIN SODIUM 5000 [USP'U]/ML
INJECTION INTRAVENOUS; SUBCUTANEOUS
Qty: 25000 | Refills: 0 | Status: DISCONTINUED | OUTPATIENT
Start: 2019-07-16 | End: 2019-07-19

## 2019-07-16 RX ADMIN — HEPARIN SODIUM 1000 UNIT(S)/HR: 5000 INJECTION INTRAVENOUS; SUBCUTANEOUS at 20:13

## 2019-07-16 RX ADMIN — Medication 40 MILLIGRAM(S): at 05:29

## 2019-07-16 RX ADMIN — HEPARIN SODIUM 800 UNIT(S)/HR: 5000 INJECTION INTRAVENOUS; SUBCUTANEOUS at 04:08

## 2019-07-16 RX ADMIN — Medication 2: at 11:43

## 2019-07-16 RX ADMIN — HEPARIN SODIUM 0 UNIT(S)/HR: 5000 INJECTION INTRAVENOUS; SUBCUTANEOUS at 03:05

## 2019-07-16 RX ADMIN — HYDROXYUREA 500 MILLIGRAM(S): 500 CAPSULE ORAL at 17:40

## 2019-07-16 RX ADMIN — Medication 1: at 07:39

## 2019-07-16 RX ADMIN — LISINOPRIL 20 MILLIGRAM(S): 2.5 TABLET ORAL at 05:30

## 2019-07-16 RX ADMIN — Medication 81 MILLIGRAM(S): at 05:29

## 2019-07-16 RX ADMIN — Medication 3: at 17:40

## 2019-07-16 RX ADMIN — PANTOPRAZOLE SODIUM 40 MILLIGRAM(S): 20 TABLET, DELAYED RELEASE ORAL at 05:30

## 2019-07-16 RX ADMIN — ATORVASTATIN CALCIUM 40 MILLIGRAM(S): 80 TABLET, FILM COATED ORAL at 21:19

## 2019-07-16 RX ADMIN — Medication 1000 UNIT(S): at 17:40

## 2019-07-16 RX ADMIN — SODIUM CHLORIDE 50 MILLILITER(S): 9 INJECTION INTRAMUSCULAR; INTRAVENOUS; SUBCUTANEOUS at 11:45

## 2019-07-16 RX ADMIN — Medication 25 MILLIGRAM(S): at 05:30

## 2019-07-16 NOTE — CHART NOTE - NSCHARTNOTEFT_GEN_A_CORE
Patient underwent a PCI procedure and is being admitted as they are at increased risk for major adverse cardiac and vascular events if discharged due to the following high risk characteristics:    UA  pLAd lesion   ACS<24 hours  anginal equivalent

## 2019-07-16 NOTE — CONSULT NOTE ADULT - ASSESSMENT
89yo  F, with no cardiac implantable device/s,  w/pmh of Afib on coumadin (has been on hold for cath), Myeloproliferative disorder on hydroxyurea ( heme/onc Dr Burgess), HTN, HLD, CAD s/p prior MI (8 years ago) with a known chronically occluded RCA, previously moderate disease in LCX and LAD managed medically, normal LV and Type 2 DM ( A1c 7.7 on 7/10/19, well managed not on insulin). She had recently had a nuclear stress test suggesting TID.  After discussion with Dr. Coley, it was decided that they would attempt to be conservative and treat this high risk stress test medically to start.      She presented to [facility]/ [facility] with episode of dyspnea on exertion and abdominal pain that morning. It was difficulty to explain her dyspnea, and no non-cardiac cause was determined.  Given the absence of a reasonable competing diagnosis, it was decided that the most prudent course was holding her warfarin and have angiography.      CE's x 2 sets were negative, Echo with normal LV function but signs of elevated filling pressures and large IVC, severe pulm HTN.  She has been euvolemic while in hospital (Lasix was switched to PO).  No evidence of acute ischemia per EKG ( Afib, HR 68, T wave inversion in V1).  Her renal status remains normal ( creat 1.20 ), and her INR was down to 1.37.    She was found to have severe lad disease.  The diagnostic was performed yesterday, and pending her clinical course and carefully weighing of risks and benefits, she is planned for lad PCI today.    The patient reports no new symptoms.  Denies chest discomfort and shortness of breath.  No abdominal pain.  No new neurologic symptoms.    -she has severe cad and high risk stress test  -though it remains unclear whether the severe cad was the cause of her sxs, it is very likely that if she is not revascularized now, she will have some other sxs of uncertain etiology  -despite her age and comorbidities, and despite the potential need for atherectomy, I favor higher risk pci as a solution for her. if we do not we may be forced to do pci under less favorable circumstances than this one  -pci to be performed today  -dapt  -statin  -bb  -ace    -rate controlled af  -cont bb  -resume ac post pci    -monitor lytes and replete prn  -will follow

## 2019-07-16 NOTE — CONSULT NOTE ADULT - SUBJECTIVE AND OBJECTIVE BOX
[facility]         Elida Douglas, Gilberto, Ayaz, Brijesh, Troy, Oskar        [phone] (office)    CHIEF COMPLAINT: Patient is a 89y old  Female who presents with a chief complaint of MUNOZ (2019 03:14)      HPI:  89yo  F, with no cardiac implantable device/s,  w/pmh of Afib on coumadin (has been on hold for cath), Myeloproliferative disorder on hydroxyurea ( heme/onc Dr Burgess), HTN, HLD, CAD s/p prior MI (8 years ago) with a known chronically occluded RCA, previously moderate disease in LCX and LAD managed medically, normal LV and Type 2 DM ( A1c 7.7 on 7/10/19, well managed not on insulin). She had recently had a nuclear stress test suggesting TID.  After discussion with Dr. Coley, it was decided that they would attempt to be conservative and treat this high risk stress test medically to start.      She presented to [facility] on 2019 with episode of dyspnea on exertion and abdominal pain that morning. It was difficulty to explain her dyspnea, and no non-cardiac cause was determined.  Given the absence of a reasonable competing diagnosis, it was decided that the most prudent course was holding her warfarin and have angiography.      CE's x 2 sets were negative, Echo with normal LV function but signs of elevated filling pressures and large IVC, severe pulm HTN.  She has been euvolemic while in hospital (Lasix was switched to PO).  No evidence of acute ischemia per EKG ( Afib, HR 68, T wave inversion in V1).  Her renal status remains normal ( creat 1.20 ), and her INR was down to 1.37.    She was found to have severe lad disease.  The diagnostic was performed yesterday, and pending her clinical course and carefully weighing of risks and benefits, she is planned for lad PCI today.    The patient reports no new symptoms.  Denies chest discomfort and shortness of breath.  No abdominal pain.  No new neurologic symptoms.          < from: TTE Echo Doppler w/o Cont (19 @ 11:37) >  OBSERVATIONS:  Technically difficult study  Mitral Valve: Dense mitral annular calcification and calcified mitral   leaflets. Mild mitral regurgitation.  Aortic Valve/Aorta: Normal trileaflet aortic valve.  Tricuspid Valve: Normal tricuspid valve. Mild tricuspid regurgitation.  Pulmonic Valve: The pulmonic valve is not well visualized. Probably   normal.  Left Atrium: Severe enlargement  Right Atrium: Mild enlargement  Left Ventricle: Overall preserved leftventricular systolic function. The   ejection fraction is approximately 60%.  Right Ventricle: Normal right ventricular size and function.  Pericardium/Pleura: No pericardial effusion noted.  Pulmonary/RV Pressure: The right ventricular systolic pressure is   estimated to be 61mmHg, assuming that the right atrial pressure is   estimated to be 20 mmHg. This is consistent with pulmonary hypertension.   The IVC is extremely dilated (2.8cm) and appears noncollapsible. The   right atrial pressure is estimated to be 15 to 20 mmHg.  LV Diastolic Function: E/e' significant elevated consistent with   increased left ventricular filling pressures    Conclusion: Technically difficult study. Overall preserved left   ventricular systolic function. EF 60%. Elevated left ventricular filling   pressures. Severe pulmonary hypertension with severely elevated right   atrial pressures based on an IVC diameter of 2.8 cm that is   noncollapsible.    < end of copied text >      < from: CT Chest w/ IV Cont (19 @ 20:12) >  CHEST:     LUNGS AND LARGE AIRWAYS: Patent central airways. Geographic areas of mild   groundglass air space opacity, may reflect air trapping. 3 mm left lower   lobe pulmonary nodule (series 2, image 56). In the absence of known risk   factors, no further routine follow-up is recommended.  PLEURA: No pleural effusion.  VESSELS: Atherosclerosis of the thoracic aorta which is otherwise normal   in caliber.  HEART: Heart size is normal. No pericardialeffusion. Aortic valvular,   coronary artery, and mitral annular calcification.  MEDIASTINUM AND RASTA: Numerous small volume mediastinal and bilateral   hilar nodes.  CHEST WALL AND LOWER NECK: Within normal limits.    ABDOMEN AND PELVIS:    LIVER: Within normal limits.  BILE DUCTS: Normal caliber.  GALLBLADDER: Within normal limits.  SPLEEN: Splenomegaly measuring up to 16.0 cm in the AP dimension..  PANCREAS: Within normal limits.  ADRENALS: Within normal limits.  KIDNEYS/URETERS: Bilateral renal cysts and low-attenuation lesions too   small to accurately characterize. No hydronephrosis.    BLADDER: Within normal limits.  REPRODUCTIVE ORGANS: Uterus and adnexa within normal limits.    BOWEL: No bowel obstruction. Colonic diverticulosis without evidence of   acute diverticulitis. Appendix is normal.  PERITONEUM: No ascites.  VESSELS:  Atherosclerosis of the abdominal aorta and its branch vessels.  RETROPERITONEUM: No lymphadenopathy.    ABDOMINAL WALL: Within normal limits.  BONES: Status post open reduction internal fixation of the right hip.   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 Multilevel degenerative changes   of the spine.    IMPRESSION:     Nonspecific geographic groundglass airspace opacity, suggestive of air   trapping.    Numerous predominantly small volume mediastinal and bilateral hilar   nodes, of unclear etiology.    Colonic diverticulosis without diverticulitis or evidence of acute   inflammation. No bowel obstruction.    Splenomegaly.    < end of copied text >    < from: Xray Chest 1 View AP/PA (19 @ 18:28) >  FINDINGS:   Thoracic aortic atheromatous changes and ectasia are stable  The heart is magnified by technique -no significant cardiac enlargement   seen.  No florid central congestive changes.  No focal consolidation. No pleural effusion or pneumothorax  No acute bony findings.    IMPRESSION:   Stable chest.  No acute process.    < end of copied text > (15 Jul 2019 11:08)      PAST MEDICAL & SURGICAL HISTORY:  Myeloproliferative disease  Anal fissure  Elevated red blood cell count  Atrial fibrillation: on coumadin  Skin cancer: of back - removed  Type 2 diabetes mellitus: not on insulin  Hypercholesterolemia  HTN (hypertension)  Myocardial infarction: 8 yrs ago  Hearing loss  S/P ORIF (open reduction internal fixation) fracture: right hip pinning  - 5 yrs ago  History of cataract surgery: 2016,  2016      SOCIAL HISTORY: No active tobacco, alcohol or illicit drug use    FAMILY HISTORY:  Family history of cancer (Sibling): sister -   Family history of heart failure: father  Family history of cancer: mother - pancreatic ca   No pertinent family history of CAD    Outpatient medications:    MEDICATIONS  (STANDING):  aspirin enteric coated 81 milliGRAM(s) Oral daily  atorvastatin 40 milliGRAM(s) Oral at bedtime  cholecalciferol 1000 Unit(s) Oral daily  dextrose 5%. 1000 milliLiter(s) (50 mL/Hr) IV Continuous <Continuous>  dextrose 50% Injectable 12.5 Gram(s) IV Push once  dextrose 50% Injectable 25 Gram(s) IV Push once  dextrose 50% Injectable 25 Gram(s) IV Push once  furosemide    Tablet 40 milliGRAM(s) Oral daily  heparin  Infusion.  Unit(s)/Hr (10 mL/Hr) IV Continuous <Continuous>  hydroxyurea 500 milliGRAM(s) Oral daily  insulin lispro (HumaLOG) corrective regimen sliding scale   SubCutaneous three times a day before meals  insulin lispro (HumaLOG) corrective regimen sliding scale   SubCutaneous at bedtime  lisinopril 20 milliGRAM(s) Oral daily  metoprolol succinate ER 25 milliGRAM(s) Oral daily  pantoprazole    Tablet 40 milliGRAM(s) Oral before breakfast  sodium chloride 0.9%. 1000 milliLiter(s) (50 mL/Hr) IV Continuous <Continuous>    MEDICATIONS  (PRN):  dextrose 40% Gel 15 Gram(s) Oral once PRN Blood Glucose LESS THAN 70 milliGRAM(s)/deciliter  glucagon  Injectable 1 milliGRAM(s) IntraMuscular once PRN Glucose LESS THAN 70 milligrams/deciliter  heparin  Injectable 4000 Unit(s) IV Push every 6 hours PRN For aPTT less than 40  heparin  Injectable 2000 Unit(s) IV Push every 6 hours PRN For aPTT between 40 - 57      Allergies    No Known Allergies    Intolerances        REVIEW OF SYSTEMS: Is negative for eye, ENT, GI, , allergic, dermatologic, musculoskeletal and neurologic, except as described above.    VITAL SIGNS:   Vital Signs Last 24 Hrs  T(C): 36.3 (2019 08:45), Max: 36.7 (15 Jul 2019 21:39)  T(F): 97.4 (2019 08:45), Max: 98.1 (15 Jul 2019 21:39)  HR: 62 (2019 10:15) (62 - 81)  BP: 144/60 (2019 10:15) (110/56 - 144/60)  BP(mean): 99 (15 Jul 2019 11:08) (99 - 99)  RR: 16 (2019 10:15) (16 - 18)  SpO2: 100% (2019 10:15) (94% - 100%)    I&O's Summary    15 Jul 2019 07:  -  2019 07:00  --------------------------------------------------------  IN: 120 mL / OUT: 0 mL / NET: 120 mL    2019 07:  -  2019 10:33  --------------------------------------------------------  IN: 0 mL / OUT: 1 mL / NET: -1 mL        PHYSICAL EXAM:    Constitutional: NAD, awake and alert, well-developed  Eyes:  EOMI, no oral cyanosis, conjunctivae clear, anicteric.  Pulmonary: Non-labored, breath sounds are clear bilaterally, no wheezing, rales or rhonchi  Cardiovascular:  regular S1 and S2. No murmur.  No rubs, gallops or clicks  Gastrointestinal: Bowel Sounds present, soft, nontender.   Lymph: No peripheral edema.   Neurological: Alert, strength and sensitivity are grossly intact  Skin: No obvious lesions/rashes.   Psych:  Mood & affect appropriate .    LABS: All Labs Reviewed:                        14.1   33.4  )-----------( 229      ( 2019 02:15 )             45.2                         12.6   27.34 )-----------( 246      ( 15 Jul 2019 06:06 )             44.9                         13.4   27.87 )-----------( 276      ( 2019 07:18 )             47.2     2019 02:15    136    |  100    |  47     ----------------------------<  211    4.2     |  24     |  1.33   15 Jul 2019 06:06    140    |  103    |  40     ----------------------------<  222    4.2     |  28     |  1.20   2019 07:18    138    |  99     |  32     ----------------------------<  226    4.1     |  29     |  1.20     Ca    9.6        2019 02:15  Ca    9.0        15 Jul 2019 06:06  Ca    9.5        2019 07:18    TPro  6.1    /  Alb  4.1    /  TBili  0.9    /  DBili  x      /  AST  13     /  ALT  10     /  AlkPhos  150    2019 02:15  TPro  6.7    /  Alb  3.7    /  TBili  1.1    /  DBili  x      /  AST  10     /  ALT  17     /  AlkPhos  177    2019 07:18    PT/INR - ( 15 Jul 2019 17:18 )   PT: 15.0 sec;   INR: 1.31 ratio         PTT - ( 2019 02:15 )  PTT:155.3 sec      Blood Culture:         RADIOLOGY:  < from: TTE Echo Doppler w/o Cont (19 @ 11:37) >     EXAM:  ECHO TTE WO CON COMP W DOPPLR         PROCEDURE DATE:  2019        INTERPRETATION:  INDICATION: dyspnea  Referring M.D.:Joe  Blood Pressure 126/75        Weight (kg) :55     Height (cm):162       BSA (sq m): 1.6  Technician: PH    Dimensions:    LA 4.3       Normal Values: 2.0 - 4.0 cm    Ao 2.7        Normal Values: 2.0 - 3.8 cm  SEPTUM 1.1       Normal Values: 0.6 - 1.2 cm  PWT 1.1       Normal Values: 0.6 - 1.1 cm  LVIDd 4.0         Normal Values: 3.0 - 5.6 cm  LVIDs 2.0       Normal Values: 1.8 - 4.0 cm      OBSERVATIONS:  Technically difficult study  Mitral Valve: Dense mitral annular calcification and calcified mitral   leaflets. Mild mitral regurgitation.  Aortic Valve/Aorta: Normal trileaflet aortic valve.  Tricuspid Valve: Normal tricuspid valve. Mild tricuspid regurgitation.  Pulmonic Valve: The pulmonic valve is not well visualized. Probably   normal.  Left Atrium: Severe enlargement  Right Atrium: Mild enlargement  Left Ventricle: Overall preserved leftventricular systolic function. The   ejection fraction is approximately 60%.  Right Ventricle: Normal right ventricular size and function.  Pericardium/Pleura: No pericardial effusion noted.  Pulmonary/RV Pressure: The right ventricular systolic pressure is   estimated to be 61mmHg, assuming that the right atrial pressure is   estimated to be 20 mmHg. This is consistent with pulmonary hypertension.   The IVC is extremely dilated (2.8cm) and appears noncollapsible. The   right atrial pressure is estimated to be 15 to 20 mmHg.  LV Diastolic Function: E/e' significant elevated consistent with   increased left ventricular filling pressures    Conclusion: Technically difficult study. Overall preserved left   ventricular systolic function. EF 60%. Elevated left ventricular filling   pressures. Severe pulmonary hypertension with severely elevated right   atrial pressures based on an IVC diameter of 2.8 cm that is   noncollapsible.                  TON EDWARDS M.D., ATTENDING CARDIOLOGIST  This document has been electronically signed. 2019  1:54PM                < end of copied text >    EKG: af

## 2019-07-16 NOTE — PHYSICAL THERAPY INITIAL EVALUATION ADULT - PRECAUTIONS/LIMITATIONS, REHAB EVAL
presenting to [facility] on 7/ 9/2019 with episode of MUNOZ and abdominal pain that morning. States that she generally has SOB with exertion but the abdominal discomfort was new today. Admits to productive cough. Difficult for her to describe abdominal pain but states that it was epigastric, non-radiating and felt as if she needed to go to the bathroom but could not. She has a h/o constipation and admits to recent diarrhea but takes miralax and colace daily, had bowel movement that day.  Denies eating anything out of the ordinary. Also admits that she felt alone ( on 7/9 that morning) when these symptoms occurred. Unclear etiology of her dyspnea, as per cardio at [place] it could be her angina equivalent.  She did have a positive nuclear stress test that was elected to manage medically ( Dr Coley) and has known residual CAD.  CE's x 2 sets are negative, Echo with normal LV function but signs of elevated filling pressures and large IVC, severe pulm HTN.  She has been euvolemic while in hospital (Lasix was switched to PO).  No evidence of acute ischemia per EKG ( Afib, HR 68, T wave inversion in V1).  Her renal status remains normal ( creat 1.20 ), and her INR is down to 1.37 today, transferred to [facility] for LHC/RHC today.  On arrival to [facility] pt is completely asymptomatic, denies: chest pain, dyspnea, dizziness, palpitations, N&V, HA, abd pain. Euvolemic on exam. 7/15: cardiac cath. presenting to [facility] on 7/ 9/2019 with episode of MUNOZ and abdominal pain that morning. States that she generally has SOB with exertion but the abdominal discomfort was new today. Admits to productive cough. Difficult for her to describe abdominal pain but states that it was epigastric, non-radiating and felt as if she needed to go to the bathroom but could not. She has a h/o constipation and admits to recent diarrhea but takes miralax and colace daily, had bowel movement that day.  Denies eating anything out of the ordinary. Also admits that she felt alone ( on 7/9 that morning) when these symptoms occurred. Unclear etiology of her dyspnea, as per cardio at [place] it could be her angina equivalent.  She did have a positive nuclear stress test that was elected to manage medically ( Dr Coley) and has known residual CAD.  CE's x 2 sets are negative, Echo with normal LV function but signs of elevated filling pressures and large IVC, severe pulm HTN.  She has been euvolemic while in hospital (Lasix was switched to PO).  No evidence of acute ischemia per EKG ( Afib, HR 68, T wave inversion in V1).  Her renal status remains normal ( creat 1.20 ), and her INR is down to 1.37 today, transferred to [facility] for LHC/RHC today.  On arrival to [facility] pt is completely asymptomatic, denies: chest pain, dyspnea, dizziness, palpitations, N&V, HA, abd pain. Euvolemic on exam. 7/15: cardiac cath./no known precautions/limitations

## 2019-07-16 NOTE — CHART NOTE - NSCHARTNOTEFT_GEN_A_CORE
Removal of Femoral Sheath    Pulses in the (right)  lower extremity are (palpable. The patient was placed in the supine position. The insertion site was identified and the sutures were removed per protocol.  The _6FA___ [place] femoral sheath was then removed by Dr Jimenez. Direct pressure was applied for  _20_____ minutes.     Monitoring of the (right  groin and both lower extremities including neuro-vascular checks and vital signs every 15 minutes x 4, then every 30 minutes x 2, then every 1 hour was ordered.    Complications: None    Comments:

## 2019-07-16 NOTE — PHYSICAL THERAPY INITIAL EVALUATION ADULT - ADDITIONAL COMMENTS
PTA pt was independent with functional mobility and ADL's with a straight cane, community ambulator. Pt lives in  with her granddaughter (pt state she is not around much for assist). Pt has 1 step to enter and no steps to negotiate inside.

## 2019-07-16 NOTE — PHYSICAL THERAPY INITIAL EVALUATION ADULT - PERTINENT HX OF CURRENT PROBLEM, REHAB EVAL
89y/o F, with no cardiac implantable device/s, w/pmh of Afib on coumadin ( INR 1.37 today, Coumadin has been on hold for cath), Myeloproliferative disorder on hydroxyurea ( heme/onc Dr Burgess), HTN, HLD, CAD s/p prior MI (8 years ago) with a known chronically occluded RCA, moderate disease in LCX and LAD managed medically, normal LV and Type 2 DM ( A1c 7.7 on 7/10/19, well managed not on insulin),

## 2019-07-17 DIAGNOSIS — E11.9 TYPE 2 DIABETES MELLITUS WITHOUT COMPLICATIONS: ICD-10-CM

## 2019-07-17 LAB
ALBUMIN SERPL ELPH-MCNC: 4.3 G/DL — SIGNIFICANT CHANGE UP (ref 3.3–5)
ALP SERPL-CCNC: 153 U/L — HIGH (ref 40–120)
ALT FLD-CCNC: 10 U/L — SIGNIFICANT CHANGE UP (ref 10–45)
ANION GAP SERPL CALC-SCNC: 15 MMOL/L — SIGNIFICANT CHANGE UP (ref 5–17)
APTT BLD: 103.6 SEC — HIGH (ref 27.5–36.3)
APTT BLD: 145.2 SEC — CRITICAL HIGH (ref 27.5–36.3)
APTT BLD: 90.5 SEC — HIGH (ref 27.5–36.3)
APTT BLD: 91.3 SEC — HIGH (ref 27.5–36.3)
AST SERPL-CCNC: 14 U/L — SIGNIFICANT CHANGE UP (ref 10–40)
BILIRUB SERPL-MCNC: 1 MG/DL — SIGNIFICANT CHANGE UP (ref 0.2–1.2)
BUN SERPL-MCNC: 40 MG/DL — HIGH (ref 7–23)
CALCIUM SERPL-MCNC: 9.2 MG/DL — SIGNIFICANT CHANGE UP (ref 8.4–10.5)
CHLORIDE SERPL-SCNC: 101 MMOL/L — SIGNIFICANT CHANGE UP (ref 96–108)
CO2 SERPL-SCNC: 22 MMOL/L — SIGNIFICANT CHANGE UP (ref 22–31)
CREAT SERPL-MCNC: 1.17 MG/DL — SIGNIFICANT CHANGE UP (ref 0.5–1.3)
GLUCOSE BLDC GLUCOMTR-MCNC: 171 MG/DL — HIGH (ref 70–99)
GLUCOSE BLDC GLUCOMTR-MCNC: 204 MG/DL — HIGH (ref 70–99)
GLUCOSE BLDC GLUCOMTR-MCNC: 245 MG/DL — HIGH (ref 70–99)
GLUCOSE BLDC GLUCOMTR-MCNC: 273 MG/DL — HIGH (ref 70–99)
GLUCOSE SERPL-MCNC: 301 MG/DL — HIGH (ref 70–99)
HCT VFR BLD CALC: 43.1 % — SIGNIFICANT CHANGE UP (ref 34.5–45)
HGB BLD-MCNC: 13.7 G/DL — SIGNIFICANT CHANGE UP (ref 11.5–15.5)
INR BLD: 1.25 RATIO — HIGH (ref 0.88–1.16)
MAGNESIUM SERPL-MCNC: 2.2 MG/DL — SIGNIFICANT CHANGE UP (ref 1.6–2.6)
MCHC RBC-ENTMCNC: 25.9 PG — LOW (ref 27–34)
MCHC RBC-ENTMCNC: 31.7 GM/DL — LOW (ref 32–36)
MCV RBC AUTO: 81.7 FL — SIGNIFICANT CHANGE UP (ref 80–100)
PLATELET # BLD AUTO: 233 K/UL — SIGNIFICANT CHANGE UP (ref 150–400)
POTASSIUM SERPL-MCNC: 4.5 MMOL/L — SIGNIFICANT CHANGE UP (ref 3.5–5.3)
POTASSIUM SERPL-SCNC: 4.5 MMOL/L — SIGNIFICANT CHANGE UP (ref 3.5–5.3)
PROT SERPL-MCNC: 6.3 G/DL — SIGNIFICANT CHANGE UP (ref 6–8.3)
PROTHROM AB SERPL-ACNC: 14.4 SEC — HIGH (ref 10–12.9)
RBC # BLD: 5.28 M/UL — HIGH (ref 3.8–5.2)
RBC # FLD: 19 % — HIGH (ref 10.3–14.5)
SODIUM SERPL-SCNC: 138 MMOL/L — SIGNIFICANT CHANGE UP (ref 135–145)
WBC # BLD: 30.6 K/UL — HIGH (ref 3.8–10.5)
WBC # FLD AUTO: 30.6 K/UL — HIGH (ref 3.8–10.5)

## 2019-07-17 PROCEDURE — 99233 SBSQ HOSP IP/OBS HIGH 50: CPT

## 2019-07-17 PROCEDURE — 99232 SBSQ HOSP IP/OBS MODERATE 35: CPT

## 2019-07-17 RX ORDER — CLOPIDOGREL BISULFATE 75 MG/1
75 TABLET, FILM COATED ORAL DAILY
Refills: 0 | Status: DISCONTINUED | OUTPATIENT
Start: 2019-07-17 | End: 2019-07-19

## 2019-07-17 RX ORDER — WARFARIN SODIUM 2.5 MG/1
5 TABLET ORAL ONCE
Refills: 0 | Status: COMPLETED | OUTPATIENT
Start: 2019-07-17 | End: 2019-07-17

## 2019-07-17 RX ORDER — DOCUSATE SODIUM 100 MG
300 CAPSULE ORAL DAILY
Refills: 0 | Status: DISCONTINUED | OUTPATIENT
Start: 2019-07-17 | End: 2019-07-19

## 2019-07-17 RX ADMIN — PANTOPRAZOLE SODIUM 40 MILLIGRAM(S): 20 TABLET, DELAYED RELEASE ORAL at 08:35

## 2019-07-17 RX ADMIN — Medication 2: at 17:06

## 2019-07-17 RX ADMIN — Medication 81 MILLIGRAM(S): at 05:05

## 2019-07-17 RX ADMIN — HEPARIN SODIUM 800 UNIT(S)/HR: 5000 INJECTION INTRAVENOUS; SUBCUTANEOUS at 04:15

## 2019-07-17 RX ADMIN — ATORVASTATIN CALCIUM 40 MILLIGRAM(S): 80 TABLET, FILM COATED ORAL at 21:39

## 2019-07-17 RX ADMIN — HEPARIN SODIUM 0 UNIT(S)/HR: 5000 INJECTION INTRAVENOUS; SUBCUTANEOUS at 03:11

## 2019-07-17 RX ADMIN — Medication 300 MILLIGRAM(S): at 11:15

## 2019-07-17 RX ADMIN — HYDROXYUREA 500 MILLIGRAM(S): 500 CAPSULE ORAL at 12:30

## 2019-07-17 RX ADMIN — Medication 1000 UNIT(S): at 11:15

## 2019-07-17 RX ADMIN — LISINOPRIL 20 MILLIGRAM(S): 2.5 TABLET ORAL at 05:05

## 2019-07-17 RX ADMIN — HEPARIN SODIUM 700 UNIT(S)/HR: 5000 INJECTION INTRAVENOUS; SUBCUTANEOUS at 22:16

## 2019-07-17 RX ADMIN — CLOPIDOGREL BISULFATE 75 MILLIGRAM(S): 75 TABLET, FILM COATED ORAL at 11:15

## 2019-07-17 RX ADMIN — HEPARIN SODIUM 700 UNIT(S)/HR: 5000 INJECTION INTRAVENOUS; SUBCUTANEOUS at 15:56

## 2019-07-17 RX ADMIN — HEPARIN SODIUM 800 UNIT(S)/HR: 5000 INJECTION INTRAVENOUS; SUBCUTANEOUS at 09:11

## 2019-07-17 RX ADMIN — Medication 3: at 12:12

## 2019-07-17 RX ADMIN — Medication 25 MILLIGRAM(S): at 05:05

## 2019-07-17 RX ADMIN — WARFARIN SODIUM 5 MILLIGRAM(S): 2.5 TABLET ORAL at 12:12

## 2019-07-17 RX ADMIN — Medication 1: at 07:43

## 2019-07-17 RX ADMIN — Medication 40 MILLIGRAM(S): at 05:05

## 2019-07-17 NOTE — PROGRESS NOTE ADULT - PROBLEM SELECTOR PLAN 1
Monitor groin site for swelling, bleeding  Continue ASA, Plavix
Monitor right groin site for swelling, bleeding  Continue ASA, Plavix  possible PCI on 7/16

## 2019-07-17 NOTE — DIETITIAN INITIAL EVALUATION ADULT. - PHYSICAL APPEARANCE
other (specify) nutrition focused physical exam deferred at this time since pt did not want to engage in interview at this time, visually pt looks to be well nourished, does seem to have prominent forehead and possible muscle loss around temples  Skin: intact  Edema: none at this time     ht:63" , wt:120 pounds, BMI:21.2 kg/m2, IBW:115 pounds +/- 10%

## 2019-07-17 NOTE — DIETITIAN INITIAL EVALUATION ADULT. - PERTINENT LABORATORY DATA
7/10: A1C 7.7% (indicating good glycemic control considering pt's age), POCT glucose: 7/17: 171, 7/16: 143-280

## 2019-07-17 NOTE — PROVIDER CONTACT NOTE (OTHER) - ASSESSMENT
Pt with 19 beats of wide complex and asymptomatic. Pt denies palpitations and chest discomfort. /72 HR 74. SPo2 95% on room air.

## 2019-07-17 NOTE — DIETITIAN INITIAL EVALUATION ADULT. - ADD RECOMMEND
1. Encourage good PO intake. 2. RD remains available for further nutrition interventions as warranted and to obtain further subjective information as feasible.

## 2019-07-17 NOTE — PROGRESS NOTE ADULT - PROBLEM SELECTOR PLAN 2
Continue antihypertensive medications with hold parameters
Continue antihypertensive medications with hold parameters

## 2019-07-17 NOTE — DIETITIAN INITIAL EVALUATION ADULT. - OTHER INFO
RD attempted to speak c pt twice yesterday, once before procedure and once after procedure, interview was deferred both times due to situation at that time. This morning, attempted to speak c pt once again, however pt upset about not being able to go home as of yet and being in the hospital for an extended period of time (was at [facility] prior to admission to [facility]).     Reviewed RD initial note from about one week ago at [facility]. Pt c 10 pounds wt loss in unknown time period. Pt yesterday did report to RD she does usually eat well. Noted pt c NKFA as per chart. Noted pt was on Coumadin PTA. Noted pt "loosely" follows Kosher dietary restrictions and her family helps to do grocery shopping at home.

## 2019-07-18 ENCOUNTER — TRANSCRIPTION ENCOUNTER (OUTPATIENT)
Age: 84
End: 2019-07-18

## 2019-07-18 LAB
ANION GAP SERPL CALC-SCNC: 14 MMOL/L — SIGNIFICANT CHANGE UP (ref 5–17)
APTT BLD: 111.5 SEC — HIGH (ref 27.5–36.3)
APTT BLD: 65.6 SEC — HIGH (ref 27.5–36.3)
APTT BLD: 71.2 SEC — HIGH (ref 27.5–36.3)
BUN SERPL-MCNC: 39 MG/DL — HIGH (ref 7–23)
CALCIUM SERPL-MCNC: 9.5 MG/DL — SIGNIFICANT CHANGE UP (ref 8.4–10.5)
CHLORIDE SERPL-SCNC: 99 MMOL/L — SIGNIFICANT CHANGE UP (ref 96–108)
CO2 SERPL-SCNC: 23 MMOL/L — SIGNIFICANT CHANGE UP (ref 22–31)
CREAT SERPL-MCNC: 1.22 MG/DL — SIGNIFICANT CHANGE UP (ref 0.5–1.3)
GLUCOSE BLDC GLUCOMTR-MCNC: 226 MG/DL — HIGH (ref 70–99)
GLUCOSE BLDC GLUCOMTR-MCNC: 241 MG/DL — HIGH (ref 70–99)
GLUCOSE BLDC GLUCOMTR-MCNC: 251 MG/DL — HIGH (ref 70–99)
GLUCOSE BLDC GLUCOMTR-MCNC: 335 MG/DL — HIGH (ref 70–99)
GLUCOSE SERPL-MCNC: 228 MG/DL — HIGH (ref 70–99)
HCT VFR BLD CALC: 42.8 % — SIGNIFICANT CHANGE UP (ref 34.5–45)
HGB BLD-MCNC: 13.2 G/DL — SIGNIFICANT CHANGE UP (ref 11.5–15.5)
INR BLD: 1.29 RATIO — HIGH (ref 0.88–1.16)
INR BLD: 1.36 RATIO — HIGH (ref 0.88–1.16)
INR BLD: 1.73 RATIO — HIGH (ref 0.88–1.16)
MCHC RBC-ENTMCNC: 25.4 PG — LOW (ref 27–34)
MCHC RBC-ENTMCNC: 30.9 GM/DL — LOW (ref 32–36)
MCV RBC AUTO: 82.4 FL — SIGNIFICANT CHANGE UP (ref 80–100)
PLATELET # BLD AUTO: 242 K/UL — SIGNIFICANT CHANGE UP (ref 150–400)
POTASSIUM SERPL-MCNC: 4 MMOL/L — SIGNIFICANT CHANGE UP (ref 3.5–5.3)
POTASSIUM SERPL-SCNC: 4 MMOL/L — SIGNIFICANT CHANGE UP (ref 3.5–5.3)
PROTHROM AB SERPL-ACNC: 14.8 SEC — HIGH (ref 10–12.9)
PROTHROM AB SERPL-ACNC: 15.6 SEC — HIGH (ref 10–12.9)
PROTHROM AB SERPL-ACNC: 20.2 SEC — HIGH (ref 10–12.9)
RBC # BLD: 5.19 M/UL — SIGNIFICANT CHANGE UP (ref 3.8–5.2)
RBC # FLD: 17.9 % — HIGH (ref 10.3–14.5)
SODIUM SERPL-SCNC: 136 MMOL/L — SIGNIFICANT CHANGE UP (ref 135–145)
WBC # BLD: 26.7 K/UL — HIGH (ref 3.8–10.5)
WBC # FLD AUTO: 26.7 K/UL — HIGH (ref 3.8–10.5)

## 2019-07-18 PROCEDURE — 99232 SBSQ HOSP IP/OBS MODERATE 35: CPT

## 2019-07-18 RX ORDER — WARFARIN SODIUM 2.5 MG/1
7.5 TABLET ORAL ONCE
Refills: 0 | Status: DISCONTINUED | OUTPATIENT
Start: 2019-07-18 | End: 2019-07-18

## 2019-07-18 RX ORDER — WARFARIN SODIUM 2.5 MG/1
2.5 TABLET ORAL ONCE
Refills: 0 | Status: COMPLETED | OUTPATIENT
Start: 2019-07-18 | End: 2019-07-18

## 2019-07-18 RX ORDER — WARFARIN SODIUM 2.5 MG/1
7.5 TABLET ORAL ONCE
Refills: 0 | Status: COMPLETED | OUTPATIENT
Start: 2019-07-18 | End: 2019-07-18

## 2019-07-18 RX ADMIN — HYDROXYUREA 500 MILLIGRAM(S): 500 CAPSULE ORAL at 11:25

## 2019-07-18 RX ADMIN — Medication 1000 UNIT(S): at 11:25

## 2019-07-18 RX ADMIN — Medication 1: at 22:04

## 2019-07-18 RX ADMIN — Medication 25 MILLIGRAM(S): at 05:29

## 2019-07-18 RX ADMIN — PANTOPRAZOLE SODIUM 40 MILLIGRAM(S): 20 TABLET, DELAYED RELEASE ORAL at 05:30

## 2019-07-18 RX ADMIN — LISINOPRIL 20 MILLIGRAM(S): 2.5 TABLET ORAL at 05:29

## 2019-07-18 RX ADMIN — Medication 40 MILLIGRAM(S): at 05:30

## 2019-07-18 RX ADMIN — WARFARIN SODIUM 2.5 MILLIGRAM(S): 2.5 TABLET ORAL at 21:39

## 2019-07-18 RX ADMIN — Medication 2: at 17:09

## 2019-07-18 RX ADMIN — HEPARIN SODIUM 600 UNIT(S)/HR: 5000 INJECTION INTRAVENOUS; SUBCUTANEOUS at 18:58

## 2019-07-18 RX ADMIN — Medication 300 MILLIGRAM(S): at 11:25

## 2019-07-18 RX ADMIN — Medication 81 MILLIGRAM(S): at 05:29

## 2019-07-18 RX ADMIN — HEPARIN SODIUM 600 UNIT(S)/HR: 5000 INJECTION INTRAVENOUS; SUBCUTANEOUS at 05:28

## 2019-07-18 RX ADMIN — Medication 2: at 08:05

## 2019-07-18 RX ADMIN — HEPARIN SODIUM 600 UNIT(S)/HR: 5000 INJECTION INTRAVENOUS; SUBCUTANEOUS at 12:22

## 2019-07-18 RX ADMIN — ATORVASTATIN CALCIUM 40 MILLIGRAM(S): 80 TABLET, FILM COATED ORAL at 21:38

## 2019-07-18 RX ADMIN — Medication 4: at 11:38

## 2019-07-18 RX ADMIN — CLOPIDOGREL BISULFATE 75 MILLIGRAM(S): 75 TABLET, FILM COATED ORAL at 05:29

## 2019-07-18 RX ADMIN — WARFARIN SODIUM 7.5 MILLIGRAM(S): 2.5 TABLET ORAL at 11:25

## 2019-07-19 ENCOUNTER — TRANSCRIPTION ENCOUNTER (OUTPATIENT)
Age: 84
End: 2019-07-19

## 2019-07-19 VITALS
RESPIRATION RATE: 17 BRPM | DIASTOLIC BLOOD PRESSURE: 77 MMHG | HEART RATE: 72 BPM | SYSTOLIC BLOOD PRESSURE: 112 MMHG | OXYGEN SATURATION: 99 %

## 2019-07-19 LAB
ANION GAP SERPL CALC-SCNC: 14 MMOL/L — SIGNIFICANT CHANGE UP (ref 5–17)
APTT BLD: 95.5 SEC — HIGH (ref 27.5–36.3)
BUN SERPL-MCNC: 38 MG/DL — HIGH (ref 7–23)
CALCIUM SERPL-MCNC: 9 MG/DL — SIGNIFICANT CHANGE UP (ref 8.4–10.5)
CHLORIDE SERPL-SCNC: 99 MMOL/L — SIGNIFICANT CHANGE UP (ref 96–108)
CO2 SERPL-SCNC: 23 MMOL/L — SIGNIFICANT CHANGE UP (ref 22–31)
CREAT SERPL-MCNC: 1.18 MG/DL — SIGNIFICANT CHANGE UP (ref 0.5–1.3)
GLUCOSE BLDC GLUCOMTR-MCNC: 228 MG/DL — HIGH (ref 70–99)
GLUCOSE BLDC GLUCOMTR-MCNC: 277 MG/DL — HIGH (ref 70–99)
GLUCOSE SERPL-MCNC: 242 MG/DL — HIGH (ref 70–99)
HCT VFR BLD CALC: 43.3 % — SIGNIFICANT CHANGE UP (ref 34.5–45)
HGB BLD-MCNC: 12.6 G/DL — SIGNIFICANT CHANGE UP (ref 11.5–15.5)
INR BLD: 2.91 RATIO — HIGH (ref 0.88–1.16)
MCHC RBC-ENTMCNC: 24 PG — LOW (ref 27–34)
MCHC RBC-ENTMCNC: 29.2 GM/DL — LOW (ref 32–36)
MCV RBC AUTO: 82.3 FL — SIGNIFICANT CHANGE UP (ref 80–100)
PLATELET # BLD AUTO: 262 K/UL — SIGNIFICANT CHANGE UP (ref 150–400)
POTASSIUM SERPL-MCNC: 3.9 MMOL/L — SIGNIFICANT CHANGE UP (ref 3.5–5.3)
POTASSIUM SERPL-SCNC: 3.9 MMOL/L — SIGNIFICANT CHANGE UP (ref 3.5–5.3)
PROTHROM AB SERPL-ACNC: 34.6 SEC — HIGH (ref 10–12.9)
RBC # BLD: 5.26 M/UL — HIGH (ref 3.8–5.2)
RBC # FLD: 18 % — HIGH (ref 10.3–14.5)
SODIUM SERPL-SCNC: 136 MMOL/L — SIGNIFICANT CHANGE UP (ref 135–145)
WBC # BLD: 25.9 K/UL — HIGH (ref 3.8–10.5)
WBC # FLD AUTO: 25.9 K/UL — HIGH (ref 3.8–10.5)

## 2019-07-19 PROCEDURE — C9600: CPT | Mod: LD

## 2019-07-19 PROCEDURE — 97161 PT EVAL LOW COMPLEX 20 MIN: CPT

## 2019-07-19 PROCEDURE — 99232 SBSQ HOSP IP/OBS MODERATE 35: CPT

## 2019-07-19 PROCEDURE — 93460 R&L HRT ART/VENTRICLE ANGIO: CPT

## 2019-07-19 PROCEDURE — 99152 MOD SED SAME PHYS/QHP 5/>YRS: CPT

## 2019-07-19 PROCEDURE — C1769: CPT

## 2019-07-19 PROCEDURE — C1725: CPT

## 2019-07-19 PROCEDURE — 85610 PROTHROMBIN TIME: CPT

## 2019-07-19 PROCEDURE — 80053 COMPREHEN METABOLIC PANEL: CPT

## 2019-07-19 PROCEDURE — 85730 THROMBOPLASTIN TIME PARTIAL: CPT

## 2019-07-19 PROCEDURE — C1874: CPT

## 2019-07-19 PROCEDURE — 83735 ASSAY OF MAGNESIUM: CPT

## 2019-07-19 PROCEDURE — 93005 ELECTROCARDIOGRAM TRACING: CPT

## 2019-07-19 PROCEDURE — 80048 BASIC METABOLIC PNL TOTAL CA: CPT

## 2019-07-19 PROCEDURE — C1894: CPT

## 2019-07-19 PROCEDURE — C1887: CPT

## 2019-07-19 PROCEDURE — 85027 COMPLETE CBC AUTOMATED: CPT

## 2019-07-19 PROCEDURE — 82962 GLUCOSE BLOOD TEST: CPT

## 2019-07-19 RX ORDER — METOPROLOL TARTRATE 50 MG
1 TABLET ORAL
Qty: 0 | Refills: 0 | DISCHARGE
Start: 2019-07-19

## 2019-07-19 RX ORDER — FUROSEMIDE 40 MG
1 TABLET ORAL
Qty: 30 | Refills: 0
Start: 2019-07-19 | End: 2019-08-17

## 2019-07-19 RX ORDER — CLOPIDOGREL BISULFATE 75 MG/1
1 TABLET, FILM COATED ORAL
Qty: 90 | Refills: 3
Start: 2019-07-19 | End: 2020-07-12

## 2019-07-19 RX ORDER — INSULIN LISPRO 100/ML
0 VIAL (ML) SUBCUTANEOUS
Qty: 0 | Refills: 0 | DISCHARGE

## 2019-07-19 RX ORDER — METOPROLOL TARTRATE 50 MG
1 TABLET ORAL
Qty: 0 | Refills: 0 | DISCHARGE

## 2019-07-19 RX ORDER — FUROSEMIDE 40 MG
1 TABLET ORAL
Qty: 0 | Refills: 0 | DISCHARGE
Start: 2019-07-19

## 2019-07-19 RX ORDER — PANTOPRAZOLE SODIUM 20 MG/1
1 TABLET, DELAYED RELEASE ORAL
Qty: 0 | Refills: 0 | DISCHARGE

## 2019-07-19 RX ORDER — METOPROLOL TARTRATE 50 MG
1 TABLET ORAL
Qty: 30 | Refills: 0
Start: 2019-07-19 | End: 2019-08-17

## 2019-07-19 RX ORDER — LISINOPRIL 2.5 MG/1
1 TABLET ORAL
Qty: 0 | Refills: 0 | DISCHARGE

## 2019-07-19 RX ADMIN — HYDROXYUREA 500 MILLIGRAM(S): 500 CAPSULE ORAL at 12:01

## 2019-07-19 RX ADMIN — Medication 81 MILLIGRAM(S): at 05:31

## 2019-07-19 RX ADMIN — Medication 1000 UNIT(S): at 12:01

## 2019-07-19 RX ADMIN — LISINOPRIL 20 MILLIGRAM(S): 2.5 TABLET ORAL at 05:31

## 2019-07-19 RX ADMIN — Medication 300 MILLIGRAM(S): at 12:02

## 2019-07-19 RX ADMIN — HEPARIN SODIUM 600 UNIT(S)/HR: 5000 INJECTION INTRAVENOUS; SUBCUTANEOUS at 06:11

## 2019-07-19 RX ADMIN — Medication 25 MILLIGRAM(S): at 05:31

## 2019-07-19 RX ADMIN — Medication 3: at 12:03

## 2019-07-19 RX ADMIN — PANTOPRAZOLE SODIUM 40 MILLIGRAM(S): 20 TABLET, DELAYED RELEASE ORAL at 05:31

## 2019-07-19 RX ADMIN — Medication 2: at 07:50

## 2019-07-19 RX ADMIN — CLOPIDOGREL BISULFATE 75 MILLIGRAM(S): 75 TABLET, FILM COATED ORAL at 05:31

## 2019-07-19 RX ADMIN — Medication 40 MILLIGRAM(S): at 05:31

## 2019-07-19 NOTE — DISCHARGE NOTE PROVIDER - HOSPITAL COURSE
HPI:    This is a 89yo  F, with no cardiac implantable device/s,  w/pmh of Afib on coumadin ( INR 1.37 today, Coumadin has been on hold for cath), Myeloproliferative disorder on hydroxyurea ( heme/onc Dr Burgess), HTN, HLD, CAD s/p prior MI (8 years ago) with a known chronically occluded RCA, moderate disease in LCX and LAD managed medically, normal LV and Type 2 DM ( A1c 7.7 on 7/10/19, well managed not on insulin), presenting to [facility] on 7/ 9/2019 with episode of dyspnea on exertion and abdominal pain that morning. States that she generally has shortness of breath with exertion but the abdominal discomfort was new today. Admits to productive cough. Difficult for her to describe abdominal pain but states that it was epigastric, non-radiating and felt as if she needed to go to the bathroom but could not. She has a history of constipation and admits to recent diarrhea but takes miralax and colace daily, had bowel movement that day.  Denies eating anything out of the ordinary. Also admits that she felt alone ( on 7/9 that morning) when these symptoms occurred. Denies headache, fevers/chills, n/v, chest pain, palpitations, diarrhea, dysuria, hematuria, edema or weakness. Denies recent use of steroids. Denies recent falls, sick contacts or recent travel. Of note, patient's symptoms improved upon arrival to ER. Unknown last colonoscopy.     -Unclear etiology of her dyspnea, as per cardio at [place] it could be her angina equivalent.  She did have a positive nuclear stress test that was elected to manage medically ( Dr Coley) and has known residual CAD.  CE's x 2 sets are negative, Echo with normal LV function but signs of elevated filling pressures and large IVC, severe pulm HTN.  She has been euvolemic while in hospital (Lasix was switched to PO).  No evidence of acute ischemia per EKG ( Afib, HR 68, T wave inversion in V1).  Her renal status remains normal ( creat 1.20 ), and her INR is down to 1.37 today, transferred to [facility] for LHC/RHC today.  On arrival to [facility] pt is completely asymptomatic, denies: chest pain, dyspnea, dizziness, palpitations, N&V, HA, abd pain. Euvolemic on exam.         7/16 cardiac cath with one stent to the prox LAD via right femoral access. HPI:    This is a 89yo  F, with no cardiac implantable device/s,  w/pmh of Afib on coumadin ( INR 1.37 today, Coumadin has been on hold for cath), Myeloproliferative disorder on hydroxyurea ( heme/onc Dr Burgess), HTN, HLD, CAD s/p prior MI (8 years ago) with a known chronically occluded RCA, moderate disease in LCX and LAD managed medically, normal LV and Type 2 DM ( A1c 7.7 on 7/10/19, well managed not on insulin), presenting to [facility] on 7/ 9/2019 with episode of dyspnea on exertion and abdominal pain that morning. States that she generally has shortness of breath with exertion but the abdominal discomfort was new today. Admits to productive cough. Difficult for her to describe abdominal pain but states that it was epigastric, non-radiating and felt as if she needed to go to the bathroom but could not. She has a history of constipation and admits to recent diarrhea but takes miralax and colace daily, had bowel movement that day.  Denies eating anything out of the ordinary. Also admits that she felt alone ( on 7/9 that morning) when these symptoms occurred. Denies headache, fevers/chills, n/v, chest pain, palpitations, diarrhea, dysuria, hematuria, edema or weakness. Denies recent use of steroids. Denies recent falls, sick contacts or recent travel. Of note, patient's symptoms improved upon arrival to ER. Unknown last colonoscopy.     -Unclear etiology of her dyspnea, as per cardio at [place] it could be her angina equivalent.  She did have a positive nuclear stress test that was elected to manage medically ( Dr Coley) and has known residual CAD.  CE's x 2 sets are negative, Echo with normal LV function but signs of elevated filling pressures and large IVC, severe pulm HTN.  She has been euvolemic while in hospital (Lasix was switched to PO).  No evidence of acute ischemia per EKG ( Afib, HR 68, T wave inversion in V1).  Her renal status remains normal ( creat 1.20 ), and her INR is down to 1.37 today, transferred to [facility] for LHC/RHC today.  On arrival to [facility] pt is completely asymptomatic, denies: chest pain, dyspnea, dizziness, palpitations, N&V, HA, abd pain. Euvolemic on exam.         7/16 cardiac cath with one stent to the prox LAD via right femoral access.    7/19 s/p PCI/stent LAD. No complaints of chest pain or dyspnea    right groin site benign with no hematoma or bleeding    +DP intact    ASA, Plavix, statin as ordered    Teaching done w son at bedside        Seen by Dr Schmitt and cleared for discharge to home (INR therapeutic)    Follow up in Dr Schmitt's office Monday for INR

## 2019-07-19 NOTE — PROGRESS NOTE ADULT - ASSESSMENT
HPI:  This is a 89yo  F, with no cardiac implantable device/s,  w/pmh of Afib on coumadin ( INR 1.37 today, Coumadin has been on hold for cath), Myeloproliferative disorder on hydroxyurea ( heme/onc Dr Burgess), HTN, HLD, CAD s/p prior MI (8 years ago) with a known chronically occluded RCA, moderate disease in LCX and LAD managed medically, normal LV and Type 2 DM ( A1c 7.7 on 7/10/19, well managed not on insulin), presenting to [facility] on 7/ 9/2019 with episode of dyspnea on exertion and abdominal pain that morning. States that she generally has shortness of breath with exertion but the abdominal discomfort was new today. Admits to productive cough. Difficult for her to describe abdominal pain but states that it was epigastric, non-radiating and felt as if she needed to go to the bathroom but could not. She has a history of constipation and admits to recent diarrhea but takes miralax and colace daily, had bowel movement that day.  Denies eating anything out of the ordinary. Also admits that she felt alone ( on 7/9 that morning) when these symptoms occurred. Denies headache, fevers/chills, n/v, chest pain, palpitations, diarrhea, dysuria, hematuria, edema or weakness. Denies recent use of steroids. Denies recent falls, sick contacts or recent travel. Of note, patient's symptoms improved upon arrival to ER. Unknown last colonoscopy.   -Unclear etiology of her dyspnea, as per cardio at [place] it could be her angina equivalent.  She did have a positive nuclear stress test that was elected to manage medically ( Dr Coley) and has known residual CAD.  CE's x 2 sets are negative, Echo with normal LV function but signs of elevated filling pressures and large IVC, severe pulm HTN.  She has been euvolemic while in hospital (Lasix was switched to PO).  No evidence of acute ischemia per EKG ( Afib, HR 68, T wave inversion in V1).  Her renal status remains normal ( creat 1.20 ), and her INR is down to 1.37 today, transferred to [facility] for LHC/RHC today.  On arrival to [facility] pt is completely asymptomatic, denies: chest pain, dyspnea, dizziness, palpitations, N&V, HA, abd pain. Euvolemic on exam.       ++ Of NOTE: patient has persistent leukocytosis but she is cleared by ID as it is idiopathic and not r/t infectious process   ++ Abdominal pain resolved, w/ CT abdomen + diverticulosis otherwise negative for pathology ( without diverticulitis or evidence of acute inflammation. No bowel obstruction. Splenomegaly), Per GI at [place] Dr Mckinney, colonoscopy not indicated at this time; cont to monitor abdominal exam and Diet as tolerated.        < from: TTE Echo Doppler w/o Cont (07.11.19 @ 11:37) >  OBSERVATIONS:  Technically difficult study  Mitral Valve: Dense mitral annular calcification and calcified mitral   leaflets. Mild mitral regurgitation.  Aortic Valve/Aorta: Normal trileaflet aortic valve.  Tricuspid Valve: Normal tricuspid valve. Mild tricuspid regurgitation.  Pulmonic Valve: The pulmonic valve is not well visualized. Probably   normal.  Left Atrium: Severe enlargement  Right Atrium: Mild enlargement  Left Ventricle: Overall preserved leftventricular systolic function. The   ejection fraction is approximately 60%.  Right Ventricle: Normal right ventricular size and function.  Pericardium/Pleura: No pericardial effusion noted.  Pulmonary/RV Pressure: The right ventricular systolic pressure is   estimated to be 61mmHg, assuming that the right atrial pressure is   estimated to be 20 mmHg. This is consistent with pulmonary hypertension.   The IVC is extremely dilated (2.8cm) and appears noncollapsible. The   right atrial pressure is estimated to be 15 to 20 mmHg.  LV Diastolic Function: E/e' significant elevated consistent with   increased left ventricular filling pressures    Conclusion: Technically difficult study. Overall preserved left   ventricular systolic function. EF 60%. Elevated left ventricular filling   pressures. Severe pulmonary hypertension with severely elevated right   atrial pressures based on an IVC diameter of 2.8 cm that is   noncollapsible.    < end of copied text >      < from: CT Chest w/ IV Cont (07.09.19 @ 20:12) >  CHEST:     LUNGS AND LARGE AIRWAYS: Patent central airways. Geographic areas of mild   groundglass air space opacity, may reflect air trapping. 3 mm left lower   lobe pulmonary nodule (series 2, image 56). In the absence of known risk   factors, no further routine follow-up is recommended.  PLEURA: No pleural effusion.  VESSELS: Atherosclerosis of the thoracic aorta which is otherwise normal   in caliber.  HEART: Heart size is normal. No pericardialeffusion. Aortic valvular,   coronary artery, and mitral annular calcification.  MEDIASTINUM AND RASTA: Numerous small volume mediastinal and bilateral   hilar nodes.  CHEST WALL AND LOWER NECK: Within normal limits.    ABDOMEN AND PELVIS:    LIVER: Within normal limits.  BILE DUCTS: Normal caliber.  GALLBLADDER: Within normal limits.  SPLEEN: Splenomegaly measuring up to 16.0 cm in the AP dimension..  PANCREAS: Within normal limits.  ADRENALS: Within normal limits.  KIDNEYS/URETERS: Bilateral renal cysts and low-attenuation lesions too   small to accurately characterize. No hydronephrosis.    BLADDER: Within normal limits.  REPRODUCTIVE ORGANS: Uterus and adnexa within normal limits.    BOWEL: No bowel obstruction. Colonic diverticulosis without evidence of   acute diverticulitis. Appendix is normal.  PERITONEUM: No ascites.  VESSELS:  Atherosclerosis of the abdominal aorta and its branch vessels.  RETROPERITONEUM: No lymphadenopathy.    ABDOMINAL WALL: Within normal limits.  BONES: Status post open reduction internal fixation of the right hip.   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 Multilevel degenerative changes   of the spine.    IMPRESSION:     Nonspecific geographic groundglass airspace opacity, suggestive of air   trapping.    Numerous predominantly small volume mediastinal and bilateral hilar   nodes, of unclear etiology.    Colonic diverticulosis without diverticulitis or evidence of acute   inflammation. No bowel obstruction.    Splenomegaly.    < end of copied text >    < from: Xray Chest 1 View AP/PA (07.09.19 @ 18:28) >  FINDINGS:   Thoracic aortic atheromatous changes and ectasia are stable  The heart is magnified by technique -no significant cardiac enlargement   seen.  No florid central congestive changes.  No focal consolidation. No pleural effusion or pneumothorax  No acute bony findings.    IMPRESSION:   Stable chest.  No acute process.    < end of copied text > (15 Jul 2019 11:08)
89yo  F, with Afib on coumadin, Myeloproliferative disorder on hydroxyurea ( heme/onc Dr Burgess), HTN, HLD, CAD s/p prior MI (8 years ago) with a known chronically occluded RCA, previously moderate disease in LCX and LAD managed medically, normal LV and Type 2 DM ( A1c 7.7 on 7/10/19, well managed not on insulin). She had recently had a nuclear stress test suggesting TID.  After discussion with Dr. Coley, it was decided that they would attempt to be conservative and treat this high risk stress test medically to start.      She presented to [facility] on 7/ 9/2019 with episode of dyspnea on exertion and abdominal pain that morning.   She was found to have severe lad disease and she is now PCI to LAD 2 days ago.  The patient reports no new symptoms.  Denies chest discomfort and shortness of breath.  No abdominal pain.  No new neurologic symptoms.    - Now s/p PCI to prox LAD  - Tolerated procedure well  - dapt  - statin  - bb and ace inhibitor    - rate controlled af  - cont bb  -  INR theraputic. dc hep       - Monitor and replete electrolytes. Keep K>4.0 and Mg>2.0.   - will follow. Upon d/c, she will follow up with Dr. Coley in our office. INR check in office on monday
89yo  F, with no cardiac implantable device/s,  w/pmh of Afib on coumadin (has been on hold for cath), Myeloproliferative disorder on hydroxyurea ( heme/onc Dr Burgess), HTN, HLD, CAD s/p prior MI (8 years ago) with a known chronically occluded RCA, previously moderate disease in LCX and LAD managed medically, normal LV and Type 2 DM ( A1c 7.7 on 7/10/19, well managed not on insulin). She had recently had a nuclear stress test suggesting TID.  After discussion with Dr. Coley, it was decided that they would attempt to be conservative and treat this high risk stress test medically to start.      She presented to [facility] on 7/ 9/2019 with episode of dyspnea on exertion and abdominal pain that morning. It was difficulty to explain her dyspnea, and no non-cardiac cause was determined.  Given the absence of a reasonable competing diagnosis, it was decided that the most prudent course was holding her warfarin and have angiography.      CE's x 2 sets were negative, Echo with normal LV function but signs of elevated filling pressures and large IVC, severe pulm HTN.  She has been euvolemic while in hospital (Lasix was switched to PO).  No evidence of acute ischemia per EKG ( Afib, HR 68, T wave inversion in V1).  Her renal status remains normal ( creat 1.20 ), and her INR was down to 1.37.    She was found to have severe lad disease.  The diagnostic was performed yesterday, and pending her clinical course and carefully weighing of risks and benefits, she is planned for lad PCI today.    The patient reports no new symptoms.  Denies chest discomfort and shortness of breath.  No abdominal pain.  No new neurologic symptoms.    -Now s/p PCI to pLAD  - Tolerated procedure well  - monitor CR closely for GELN.   - dapt  - statin  - bb  - ace    -rate controlled af  -cont bb  - Cont Hep gtt. Adjust per protocol  - Would need to bridge with Coumadin. Dose Coumadin. Goal INR 2-3. Needs Daily INRs checks. Monitor H/H and for clinical signs of bleeding.        - Monitor and replete electrolytes. Keep K>4.0 and Mg>2.0.   -will follow
89yo  F, with Afib on coumadin, Myeloproliferative disorder on hydroxyurea ( heme/onc Dr Burgess), HTN, HLD, CAD s/p prior MI (8 years ago) with a known chronically occluded RCA, previously moderate disease in LCX and LAD managed medically, normal LV and Type 2 DM ( A1c 7.7 on 7/10/19, well managed not on insulin). She had recently had a nuclear stress test suggesting TID.  After discussion with Dr. Coley, it was decided that they would attempt to be conservative and treat this high risk stress test medically to start.      She presented to [facility] on 7/ 9/2019 with episode of dyspnea on exertion and abdominal pain that morning.   She was found to have severe lad disease and she is now PCI to LAD 2 days ago.  The patient reports no new symptoms.  Denies chest discomfort and shortness of breath.  No abdominal pain.  No new neurologic symptoms.    - Now s/p PCI to prox LAD  - Tolerated procedure well  - dapt  - statin  - bb and ace inhibitor    - rate controlled af  - cont bb  - Cont Hep gtt. Adjust per protocol  - Would need to bridge with Coumadin. Dose Coumadin. Goal INR 2-3. Needs Daily INRs checks. Monitor H/H and for clinical signs of bleeding.        - Monitor and replete electrolytes. Keep K>4.0 and Mg>2.0.   - will follow. Upon d/c, she will follow up with Dr. Coley in our office.
HPI:  This is a 89yo  F, with no cardiac implantable device/s,  w/pmh of Afib on coumadin ( INR 1.37 today, Coumadin has been on hold for cath), Myeloproliferative disorder on hydroxyurea ( heme/onc Dr Burgess), HTN, HLD, CAD s/p prior MI (8 years ago) with a known chronically occluded RCA, moderate disease in LCX and LAD managed medically, normal LV and Type 2 DM ( A1c 7.7 on 7/10/19, well managed not on insulin), presenting to [facility] on 7/ 9/2019 with episode of dyspnea on exertion and abdominal pain that morning. States that she generally has shortness of breath with exertion but the abdominal discomfort was new today. Admits to productive cough. Difficult for her to describe abdominal pain but states that it was epigastric, non-radiating and felt as if she needed to go to the bathroom but could not. She has a history of constipation and admits to recent diarrhea but takes miralax and colace daily, had bowel movement that day.  Denies eating anything out of the ordinary. Also admits that she felt alone ( on 7/9 that morning) when these symptoms occurred. Denies headache, fevers/chills, n/v, chest pain, palpitations, diarrhea, dysuria, hematuria, edema or weakness. Denies recent use of steroids. Denies recent falls, sick contacts or recent travel. Of note, patient's symptoms improved upon arrival to ER. Unknown last colonoscopy.   -Unclear etiology of her dyspnea, as per cardio at [place] it could be her angina equivalent.  She did have a positive nuclear stress test that was elected to manage medically ( Dr Coley) and has known residual CAD.  CE's x 2 sets are negative, Echo with normal LV function but signs of elevated filling pressures and large IVC, severe pulm HTN.  She has been euvolemic while in hospital (Lasix was switched to PO).  No evidence of acute ischemia per EKG ( Afib, HR 68, T wave inversion in V1).  Her renal status remains normal ( creat 1.20 ), and her INR is down to 1.37 today, transferred to [facility] for LHC/RHC today.  On arrival to [facility] pt is completely asymptomatic, denies: chest pain, dyspnea, dizziness, palpitations, N&V, HA, abd pain. Euvolemic on exam.     ++ Of NOTE: patient has persistent leukocytosis but she is cleared by ID as it is idiopathic and not r/t infectious process

## 2019-07-19 NOTE — DISCHARGE NOTE PROVIDER - NSDCPNSUBOBJ_GEN_ALL_CORE
Patient is a 89y old  Female who presents with a chief complaint of cad (18 Jul 2019 07:03)                    Allergies        No Known Allergies        Intolerances                Medications:    aspirin enteric coated 81 milliGRAM(s) Oral daily    atorvastatin 40 milliGRAM(s) Oral at bedtime    cholecalciferol 1000 Unit(s) Oral daily    clopidogrel Tablet 75 milliGRAM(s) Oral daily    dextrose 40% Gel 15 Gram(s) Oral once PRN    dextrose 5%. 1000 milliLiter(s) IV Continuous <Continuous>    dextrose 50% Injectable 12.5 Gram(s) IV Push once    dextrose 50% Injectable 25 Gram(s) IV Push once    dextrose 50% Injectable 25 Gram(s) IV Push once    docusate sodium 300 milliGRAM(s) Oral daily    furosemide    Tablet 40 milliGRAM(s) Oral daily    glucagon  Injectable 1 milliGRAM(s) IntraMuscular once PRN    heparin  Infusion.  Unit(s)/Hr IV Continuous <Continuous>    heparin  Injectable 4000 Unit(s) IV Push every 6 hours PRN    heparin  Injectable 2000 Unit(s) IV Push every 6 hours PRN    hydroxyurea 500 milliGRAM(s) Oral daily    insulin lispro (HumaLOG) corrective regimen sliding scale   SubCutaneous three times a day before meals    insulin lispro (HumaLOG) corrective regimen sliding scale   SubCutaneous at bedtime    lisinopril 20 milliGRAM(s) Oral daily    metoprolol succinate ER 25 milliGRAM(s) Oral daily    pantoprazole    Tablet 40 milliGRAM(s) Oral before breakfast    sodium chloride 0.9%. 1000 milliLiter(s) IV Continuous <Continuous>            Vitals:    T(C): 36.3 (07-18-19 @ 21:18), Max: 36.5 (07-18-19 @ 05:10)    HR: 76 (07-18-19 @ 21:18) (66 - 76)    BP: 123/66 (07-18-19 @ 21:18) (114/86 - 123/66)    BP(mean): --    RR: 18 (07-18-19 @ 21:18) (17 - 18)    SpO2: 98% (07-18-19 @ 21:18) (98% - 100%)    Wt(kg): --    Daily       Daily     I&O's Summary        17 Jul 2019 07:01  -  18 Jul 2019 07:00    --------------------------------------------------------    IN: 600 mL / OUT: 0 mL / NET: 600 mL        18 Jul 2019 07:01  -  19 Jul 2019 01:08    --------------------------------------------------------    IN: 300 mL / OUT: 0 mL / NET: 300 mL                    Physical Exam:    Appearance: Normal    Eyes: PERRL, EOMI    HENT: Normal oral muscosa, NC/AT    Cardiovascular: S1S2, RRR, No M/R/G, no JVD, No Lower extremity edema    Procedural Access Site: No hematoma, Non-tender to palpation, 2+ pulse, No bruit, No Ecchymosis    Respiratory: Clear to auscultation bilaterally    Gastrointestinal: Soft, Non tender, Normal Bowel Sounds    Musculoskeletal: No clubbing, No joint deformity     Neurologic: Non-focal    Lymphatic: No lymphadenopathy    Psychiatry: AAOx3, Mood & affect appropriate    Skin: No rashes, No ecchymoses, No cyanosis        07-18        136  |  99  |  39<H>    ----------------------------<  228<H>    4.0   |  23  |  1.22        Ca    9.5      18 Jul 2019 04:12    Mg     2.2     07-17        TPro  6.3  /  Alb  4.3  /  TBili  1.0  /  DBili  x   /  AST  14  /  ALT  10  /  AlkPhos  153<H>  07-17        PT/INR - ( 18 Jul 2019 18:31 )   PT: 20.2 sec;   INR: 1.73 ratio               PTT - ( 18 Jul 2019 18:31 )  PTT:71.2 sec                Lipid panel     Hgb A1c                             13.2     26.7  )-----------( 242      ( 18 Jul 2019 04:12 )               42.8                 ECG: Afib 62 bpm        Cath: one prox LAD stent        Imaging:        Interpretation of Telemetry: Patient is a 89y old  Female who presents with a chief complaint of cad (18 Jul 2019 07:03)                    Allergies        No Known Allergies        Intolerances                Medications:    aspirin enteric coated 81 milliGRAM(s) Oral daily    atorvastatin 40 milliGRAM(s) Oral at bedtime    cholecalciferol 1000 Unit(s) Oral daily    clopidogrel Tablet 75 milliGRAM(s) Oral daily    dextrose 40% Gel 15 Gram(s) Oral once PRN    dextrose 5%. 1000 milliLiter(s) IV Continuous <Continuous>    dextrose 50% Injectable 12.5 Gram(s) IV Push once    dextrose 50% Injectable 25 Gram(s) IV Push once    dextrose 50% Injectable 25 Gram(s) IV Push once    docusate sodium 300 milliGRAM(s) Oral daily    furosemide    Tablet 40 milliGRAM(s) Oral daily    glucagon  Injectable 1 milliGRAM(s) IntraMuscular once PRN    heparin  Infusion.  Unit(s)/Hr IV Continuous <Continuous>    heparin  Injectable 4000 Unit(s) IV Push every 6 hours PRN    heparin  Injectable 2000 Unit(s) IV Push every 6 hours PRN    hydroxyurea 500 milliGRAM(s) Oral daily    insulin lispro (HumaLOG) corrective regimen sliding scale   SubCutaneous three times a day before meals    insulin lispro (HumaLOG) corrective regimen sliding scale   SubCutaneous at bedtime    lisinopril 20 milliGRAM(s) Oral daily    metoprolol succinate ER 25 milliGRAM(s) Oral daily    pantoprazole    Tablet 40 milliGRAM(s) Oral before breakfast    sodium chloride 0.9%. 1000 milliLiter(s) IV Continuous <Continuous>            Vitals:    T(C): 36.3 (07-18-19 @ 21:18), Max: 36.5 (07-18-19 @ 05:10)    HR: 76 (07-18-19 @ 21:18) (66 - 76)    BP: 123/66 (07-18-19 @ 21:18) (114/86 - 123/66)    BP(mean): --    RR: 18 (07-18-19 @ 21:18) (17 - 18)    SpO2: 98% (07-18-19 @ 21:18) (98% - 100%)    Wt(kg): --    Daily       Daily     I&O's Summary        17 Jul 2019 07:01  -  18 Jul 2019 07:00    --------------------------------------------------------    IN: 600 mL / OUT: 0 mL / NET: 600 mL        18 Jul 2019 07:01  -  19 Jul 2019 01:08    --------------------------------------------------------    IN: 300 mL / OUT: 0 mL / NET: 300 mL                    Physical Exam:    Appearance: Normal    Eyes: PERRL, EOMI    HENT: Normal oral muscosa, NC/AT    Cardiovascular: S1S2, RRR, No M/R/G, no JVD, No Lower extremity edema    Procedural Access Site: No hematoma, Non-tender to palpation, 2+ pulse, No bruit, No Ecchymosis    Respiratory: Clear to auscultation bilaterally    Gastrointestinal: Soft, Non tender, Normal Bowel Sounds    Musculoskeletal: No clubbing, No joint deformity     Neurologic: Non-focal    Lymphatic: No lymphadenopathy    Psychiatry: AAOx3, Mood & affect appropriate    Skin: No rashes, No ecchymoses, No cyanosis        07-18        136  |  99  |  39<H>    ----------------------------<  228<H>    4.0   |  23  |  1.22        Ca    9.5      18 Jul 2019 04:12    Mg     2.2     07-17        TPro  6.3  /  Alb  4.3  /  TBili  1.0  /  DBili  x   /  AST  14  /  ALT  10  /  AlkPhos  153<H>  07-17        PT/INR - ( 18 Jul 2019 18:31 )   PT: 20.2 sec;   INR: 1.73 ratio               PTT - ( 18 Jul 2019 18:31 )  PTT:71.2 sec                Lipid panel     Hgb A1c                             13.2     26.7  )-----------( 242      ( 18 Jul 2019 04:12 )               42.8                 ECG: Afib 62 bpm        Cath: one prox LAD stent        Imaging:        Interpretation of Telemetry:                CAD    Monitor groin site for swelling, bleeding    Continue ASA, Plavix         HTN    Continue antihypertensive medications with hold parameters         continue statin    confirm lipid panel results

## 2019-07-19 NOTE — DISCHARGE NOTE PROVIDER - NSDCCPCAREPLAN_GEN_ALL_CORE_FT
PRINCIPAL DISCHARGE DIAGNOSIS  Diagnosis: CAD (coronary artery disease), native coronary artery  Assessment and Plan of Treatment: Do not stop your aspirin or Plavix unless instructed to do so by your cardiologist, they help keep your stented arteries open.   No heavy lifting, strenuous activity, bending, straining, or unnecessary stair climbing for 2 weeks. No driving for 2 days. You may shower 24 hours following the procedure but avoid baths/swimming for 1 week. Check your groin site for bleeding and/or swelling daily following procedure and call your doctor immediately if it occurs or if you experience increased pain at the site. Follow up with your cardiologist in 1-2 weeks. You may call [facility] if you have any questions/concerns regarding your procedure [phone].      SECONDARY DISCHARGE DIAGNOSES  Diagnosis: Atrial fibrillation  Assessment and Plan of Treatment: Continue with your cardiologist and primary care MD. Continue your current medications. Call your physician for palpitations, feelings of rapid heart beat, lightheadedness, or dizziness. If you are on warfarin (Coumadin), have your blood work drawn (prescription given) on ________________. Ask your nurse for written material about Coumadin and to provide patient education before discharge.    Diagnosis: HTN (hypertension)  Assessment and Plan of Treatment: Continue with your blood pressure medications; eat a heart healthy diet with low salt diet; exercise regularly (consult with your physician or cardiologist first); maintain a heart healthy weight; if you smoke - quit (A resource to help you stop smoking is the [facility] [employer] [facility] – phone number [phone].); include healthy ways to manage stress. Continue to follow with your primary care physician or cardiologist.    Diagnosis: HLD (hyperlipidemia)  Assessment and Plan of Treatment: Continue with your cholesterol medications. Eat a heart healthy diet that is low in saturated fats and salt, and includes whole grains, fruits, vegetables and lean protein; exercise regularly (consult with your physician or cardiologist first); maintain a heart healthy weight; if you smoke - quit (A resource to help you stop smoking is the [facility] Tobacco [facility] – phone number [phone].). Continue to follow with your primary physician or cardiologist.    Diagnosis: DM type 2, goal HbA1c < 7%  Assessment and Plan of Treatment: Your Hemoglobin A1c level is   Continue to follow with your primary care MD or your endocrinologist.  Follow a heart healthy diabetic diet. If you check your fingerstick glucose at home, call your MD if it is greater than 250mg/dL on 2 occasions or less than 100mg/dL on 2 occasions. Know signs of low blood sugar, such as: dizziness, shakiness, sweating, confusion, hunger, nervousness-drink 4 ounces apple juice if occurs and call your doctor. Know early signs of high blood sugar, such as: frequent urination, increased thirst, blurry vision, fatigue, headache - call your doctor if this occurs. Follow with other practitioners to care for your diabetes, such as ophthalmologist and podiatrist.

## 2019-07-19 NOTE — DISCHARGE NOTE NURSING/CASE MANAGEMENT/SOCIAL WORK - NSDCDPATPORTLINK_GEN_ALL_CORE
You can access the [facility] Patient Portal, offered by [facility], by registering with the following website: [email or web address]

## 2019-07-19 NOTE — DISCHARGE NOTE PROVIDER - CARE PROVIDER_API CALL
James Coley)  Internal Medicine  [address] [place] [address]  Phone: [phone]  Fax: [phone]  Follow Up Time:

## 2019-07-19 NOTE — PROGRESS NOTE ADULT - SUBJECTIVE AND OBJECTIVE BOX
Patient is a 89y old  Female who presents with a chief complaint of MUNOZ (16 Jul 2019 03:14)          Allergies    No Known Allergies    Intolerances        Medications:  aspirin enteric coated 81 milliGRAM(s) Oral daily  atorvastatin 40 milliGRAM(s) Oral at bedtime  cholecalciferol 1000 Unit(s) Oral daily  dextrose 40% Gel 15 Gram(s) Oral once PRN  dextrose 5%. 1000 milliLiter(s) IV Continuous <Continuous>  dextrose 50% Injectable 12.5 Gram(s) IV Push once  dextrose 50% Injectable 25 Gram(s) IV Push once  dextrose 50% Injectable 25 Gram(s) IV Push once  furosemide    Tablet 40 milliGRAM(s) Oral daily  glucagon  Injectable 1 milliGRAM(s) IntraMuscular once PRN  heparin  Infusion.  Unit(s)/Hr IV Continuous <Continuous>  heparin  Injectable 4000 Unit(s) IV Push every 6 hours PRN  heparin  Injectable 2000 Unit(s) IV Push every 6 hours PRN  hydroxyurea 500 milliGRAM(s) Oral daily  insulin lispro (HumaLOG) corrective regimen sliding scale   SubCutaneous three times a day before meals  insulin lispro (HumaLOG) corrective regimen sliding scale   SubCutaneous at bedtime  lisinopril 20 milliGRAM(s) Oral daily  metoprolol succinate ER 25 milliGRAM(s) Oral daily  pantoprazole    Tablet 40 milliGRAM(s) Oral before breakfast  sodium chloride 0.9%. 1000 milliLiter(s) IV Continuous <Continuous>      Vitals:  T(C): 36.4 (07-17-19 @ 04:41), Max: 36.7 (07-16-19 @ 21:47)  HR: 83 (07-17-19 @ 04:41) (62 - 83)  BP: 136/65 (07-17-19 @ 04:41) (109/69 - 144/60)  BP(mean): --  RR: 18 (07-17-19 @ 04:41) (16 - 74)  SpO2: 96% (07-17-19 @ 04:41) (94% - 100%)  Wt(kg): --  Daily     Daily   I&O's Summary    15 Jul 2019 07:01  -  16 Jul 2019 07:00  --------------------------------------------------------  IN: 120 mL / OUT: 0 mL / NET: 120 mL    16 Jul 2019 07:01  -  17 Jul 2019 05:03  --------------------------------------------------------  IN: 0 mL / OUT: 1 mL / NET: -1 mL          Physical Exam:  Appearance: Normal  Eyes: PERRL, EOMI  HENT: Normal oral muscosa, NC/AT  Cardiovascular: S1S2, RRR, No M/R/G, no JVD, No Lower extremity edema  Procedural Access Site: No hematoma, Non-tender to palpation, 2+ pulse, No bruit, No Ecchymosis  Respiratory: Clear to auscultation bilaterally  Gastrointestinal: Soft, Non tender, Normal Bowel Sounds  Musculoskeletal: No clubbing, No joint deformity   Neurologic: Non-focal  Lymphatic: No lymphadenopathy  Psychiatry: AAOx3, Mood & affect appropriate  Skin: No rashes, No ecchymoses, No cyanosis    07-16    136  |  100  |  47<H>  ----------------------------<  211<H>  4.2   |  24  |  1.33<H>    Ca    9.6      16 Jul 2019 02:15    TPro  6.1  /  Alb  4.1  /  TBili  0.9  /  DBili  x   /  AST  13  /  ALT  10  /  AlkPhos  150<H>  07-16    PT/INR - ( 15 Jul 2019 17:18 )   PT: 15.0 sec;   INR: 1.31 ratio         PTT - ( 17 Jul 2019 02:08 )  PTT:145.2 sec        Lipid panel   Hgb A1c                         13.7   30.6  )-----------( 233      ( 17 Jul 2019 02:08 )             43.1         ECG: Afib 62 bpm    Cath: 2 prox LAD stents    Imaging:    Interpretation of Telemetry:
[facility] -- Elida Douglas, Ayaz Macias Pannella, Patel, Savella  Office # [phone]      Follow Up:  CAD, AF    Subjective/Observations:   Patient seen and examined. Events noted. Resting comfortably in bed. No complaints of chest pain, dyspnea, or palpitations reported. No signs of orthopnea or PND.     REVIEW OF SYSTEMS: All other review of systems is negative unless indicated above    PAST MEDICAL & SURGICAL HISTORY:  Myeloproliferative disease  Anal fissure  Elevated red blood cell count  Atrial fibrillation: on coumadin  Skin cancer: of back - removed  Type 2 diabetes mellitus: not on insulin  Hypercholesterolemia  HTN (hypertension)  Myocardial infarction: 8 yrs ago  Hearing loss  S/P ORIF (open reduction internal fixation) fracture: right hip pinning  - 5 yrs ago  History of cataract surgery: 03/2016,  06/2016      MEDICATIONS  (STANDING):  aspirin enteric coated 81 milliGRAM(s) Oral daily  atorvastatin 40 milliGRAM(s) Oral at bedtime  cholecalciferol 1000 Unit(s) Oral daily  clopidogrel Tablet 75 milliGRAM(s) Oral daily  dextrose 5%. 1000 milliLiter(s) (50 mL/Hr) IV Continuous <Continuous>  dextrose 50% Injectable 12.5 Gram(s) IV Push once  dextrose 50% Injectable 25 Gram(s) IV Push once  dextrose 50% Injectable 25 Gram(s) IV Push once  docusate sodium 300 milliGRAM(s) Oral daily  furosemide    Tablet 40 milliGRAM(s) Oral daily  hydroxyurea 500 milliGRAM(s) Oral daily  insulin lispro (HumaLOG) corrective regimen sliding scale   SubCutaneous three times a day before meals  insulin lispro (HumaLOG) corrective regimen sliding scale   SubCutaneous at bedtime  lisinopril 20 milliGRAM(s) Oral daily  metoprolol succinate ER 25 milliGRAM(s) Oral daily  pantoprazole    Tablet 40 milliGRAM(s) Oral before breakfast  sodium chloride 0.9%. 1000 milliLiter(s) (50 mL/Hr) IV Continuous <Continuous>    MEDICATIONS  (PRN):  dextrose 40% Gel 15 Gram(s) Oral once PRN Blood Glucose LESS THAN 70 milliGRAM(s)/deciliter  glucagon  Injectable 1 milliGRAM(s) IntraMuscular once PRN Glucose LESS THAN 70 milligrams/deciliter      Allergies    No Known Allergies    Intolerances            Vital Signs Last 24 Hrs  T(C): 36.6 (19 Jul 2019 05:09), Max: 36.6 (19 Jul 2019 05:09)  T(F): 97.8 (19 Jul 2019 05:09), Max: 97.8 (19 Jul 2019 05:09)  HR: 66 (19 Jul 2019 05:09) (66 - 76)  BP: 123/47 (19 Jul 2019 05:09) (122/53 - 123/66)  BP(mean): --  RR: 18 (19 Jul 2019 05:09) (18 - 18)  SpO2: 99% (19 Jul 2019 05:09) (98% - 99%)    I&O's Summary    18 Jul 2019 07:01  -  19 Jul 2019 07:00  --------------------------------------------------------  IN: 420 mL / OUT: 0 mL / NET: 420 mL          PHYSICAL EXAM:  TELE: AF 60-70  Constitutional: NAD, awake    HEENT: Moist Mucous Membranes, Anicteric  Pulmonary: Decreased breath sounds b/l. No rales, crackles or wheeze appreciated.   Cardiovascular: IRRR, S1 and S2, No murmurs, rubs, gallops or clicks  Gastrointestinal: Bowel Sounds present, soft, nontender.   Lymph: No peripheral edema. No lymphadenopathy.  Skin: No visible rashes or ulcers.  Psych:  Mood & affect appropriate for situation    LABS: All Labs Reviewed:                        12.6   25.9  )-----------( 262      ( 19 Jul 2019 05:47 )             43.3                         13.2   26.7  )-----------( 242      ( 18 Jul 2019 04:12 )             42.8                         13.7   30.6  )-----------( 233      ( 17 Jul 2019 02:08 )             43.1     19 Jul 2019 05:47    136    |  99     |  38     ----------------------------<  242    3.9     |  23     |  1.18   18 Jul 2019 04:12    136    |  99     |  39     ----------------------------<  228    4.0     |  23     |  1.22   17 Jul 2019 11:18    138    |  101    |  40     ----------------------------<  301    4.5     |  22     |  1.17     Ca    9.0        19 Jul 2019 05:47  Ca    9.5        18 Jul 2019 04:12  Ca    9.2        17 Jul 2019 11:18  Mg     2.2       17 Jul 2019 11:18    TPro  6.3    /  Alb  4.3    /  TBili  1.0    /  DBili  x      /  AST  14     /  ALT  10     /  AlkPhos  153    17 Jul 2019 11:18    PT/INR - ( 19 Jul 2019 05:47 )   PT: 34.6 sec;   INR: 2.91 ratio         PTT - ( 19 Jul 2019 05:47 )  PTT:95.5 sec
[facility] - Elida Douglas, Gilberto, Ayaz, Brijesh, Oskar Simmons  Office Number:  [phone]    Patient resting comfortably in bed in NAD.  Laying flat with no respiratory distress.  No complaints of chest pain, dyspnea, palpitations, PND, or orthopnea.  remains on heparin gtt    ROS: negative unless otherwise mentioned.    Telemetry:  af    MEDICATIONS  (STANDING):  aspirin enteric coated 81 milliGRAM(s) Oral daily  atorvastatin 40 milliGRAM(s) Oral at bedtime  cholecalciferol 1000 Unit(s) Oral daily  clopidogrel Tablet 75 milliGRAM(s) Oral daily  dextrose 5%. 1000 milliLiter(s) (50 mL/Hr) IV Continuous <Continuous>  dextrose 50% Injectable 12.5 Gram(s) IV Push once  dextrose 50% Injectable 25 Gram(s) IV Push once  dextrose 50% Injectable 25 Gram(s) IV Push once  docusate sodium 300 milliGRAM(s) Oral daily  furosemide    Tablet 40 milliGRAM(s) Oral daily  heparin  Infusion.  Unit(s)/Hr (10 mL/Hr) IV Continuous <Continuous>  hydroxyurea 500 milliGRAM(s) Oral daily  insulin lispro (HumaLOG) corrective regimen sliding scale   SubCutaneous three times a day before meals  insulin lispro (HumaLOG) corrective regimen sliding scale   SubCutaneous at bedtime  lisinopril 20 milliGRAM(s) Oral daily  metoprolol succinate ER 25 milliGRAM(s) Oral daily  pantoprazole    Tablet 40 milliGRAM(s) Oral before breakfast  sodium chloride 0.9%. 1000 milliLiter(s) (50 mL/Hr) IV Continuous <Continuous>    MEDICATIONS  (PRN):  dextrose 40% Gel 15 Gram(s) Oral once PRN Blood Glucose LESS THAN 70 milliGRAM(s)/deciliter  glucagon  Injectable 1 milliGRAM(s) IntraMuscular once PRN Glucose LESS THAN 70 milligrams/deciliter  heparin  Injectable 4000 Unit(s) IV Push every 6 hours PRN For aPTT less than 40  heparin  Injectable 2000 Unit(s) IV Push every 6 hours PRN For aPTT between 40 - 57      Allergies    No Known Allergies    Intolerances        Vital Signs Last 24 Hrs  T(C): 36.5 (18 Jul 2019 05:10), Max: 36.5 (18 Jul 2019 05:10)  T(F): 97.7 (18 Jul 2019 05:10), Max: 97.7 (18 Jul 2019 05:10)  HR: 66 (18 Jul 2019 05:10) (66 - 79)  BP: 114/86 (18 Jul 2019 05:10) (105/51 - 137/60)  BP(mean): --  RR: 17 (18 Jul 2019 05:10) (17 - 18)  SpO2: 100% (18 Jul 2019 05:10) (97% - 100%)    I&O's Summary    17 Jul 2019 07:01  -  18 Jul 2019 07:00  --------------------------------------------------------  IN: 600 mL / OUT: 0 mL / NET: 600 mL        ON EXAM:    Constitutional: NAD, awake    HEENT: Moist Mucous Membranes, Anicteric  Pulmonary: Decreased breath sounds b/l. No rales, crackles or wheeze appreciated.   Cardiovascular: IRRR, S1 and S2, No murmurs, rubs, gallops or clicks  Gastrointestinal: Bowel Sounds present, soft, nontender.   Lymph: No peripheral edema. No lymphadenopathy.  Skin: No visible rashes or ulcers. right groin no hematoma/bruit  Psych:  Mood & affect appropriate for situation    LABS: All Labs Reviewed:                        13.2   26.7  )-----------( 242      ( 18 Jul 2019 04:12 )             42.8                         13.7   30.6  )-----------( 233      ( 17 Jul 2019 02:08 )             43.1                         14.1   33.4  )-----------( 229      ( 16 Jul 2019 02:15 )             45.2     18 Jul 2019 04:12    136    |  99     |  39     ----------------------------<  228    4.0     |  23     |  1.22   17 Jul 2019 11:18    138    |  101    |  40     ----------------------------<  301    4.5     |  22     |  1.17   16 Jul 2019 02:15    136    |  100    |  47     ----------------------------<  211    4.2     |  24     |  1.33     Ca    9.5        18 Jul 2019 04:12  Ca    9.2        17 Jul 2019 11:18  Ca    9.6        16 Jul 2019 02:15  Mg     2.2       17 Jul 2019 11:18    TPro  6.3    /  Alb  4.3    /  TBili  1.0    /  DBili  x      /  AST  14     /  ALT  10     /  AlkPhos  153    17 Jul 2019 11:18  TPro  6.1    /  Alb  4.1    /  TBili  0.9    /  DBili  x      /  AST  13     /  ALT  10     /  AlkPhos  150    16 Jul 2019 02:15    PT/INR - ( 17 Jul 2019 21:55 )   PT: 15.6 sec;   INR: 1.36 ratio         PTT - ( 18 Jul 2019 04:06 )  PTT:111.5 sec      Blood Culture:
[facility] -- Elida Douglas, Gilberto, Ayaz, Troy Coley Savella  Office # [phone]      Follow Up:  CAD, CHF    Subjective/Observations: Patient seen and examined. Events noted. Resting comfortably in bed. No complaints of chest pain, dyspnea, or palpitations reported. No signs of orthopnea or PND. Now s/p LAD/D1 PCI      REVIEW OF SYSTEMS: All other review of systems is negative unless indicated above    PAST MEDICAL & SURGICAL HISTORY:  Myeloproliferative disease  Anal fissure  Elevated red blood cell count  Atrial fibrillation: on coumadin  Skin cancer: of back - removed  Type 2 diabetes mellitus: not on insulin  Hypercholesterolemia  HTN (hypertension)  Myocardial infarction: 8 yrs ago  Hearing loss  S/P ORIF (open reduction internal fixation) fracture: right hip pinning  - 5 yrs ago  History of cataract surgery: 2016,  2016      MEDICATIONS  (STANDING):  aspirin enteric coated 81 milliGRAM(s) Oral daily  atorvastatin 40 milliGRAM(s) Oral at bedtime  cholecalciferol 1000 Unit(s) Oral daily  dextrose 5%. 1000 milliLiter(s) (50 mL/Hr) IV Continuous <Continuous>  dextrose 50% Injectable 12.5 Gram(s) IV Push once  dextrose 50% Injectable 25 Gram(s) IV Push once  dextrose 50% Injectable 25 Gram(s) IV Push once  furosemide    Tablet 40 milliGRAM(s) Oral daily  heparin  Infusion.  Unit(s)/Hr (10 mL/Hr) IV Continuous <Continuous>  hydroxyurea 500 milliGRAM(s) Oral daily  insulin lispro (HumaLOG) corrective regimen sliding scale   SubCutaneous three times a day before meals  insulin lispro (HumaLOG) corrective regimen sliding scale   SubCutaneous at bedtime  lisinopril 20 milliGRAM(s) Oral daily  metoprolol succinate ER 25 milliGRAM(s) Oral daily  pantoprazole    Tablet 40 milliGRAM(s) Oral before breakfast  sodium chloride 0.9%. 1000 milliLiter(s) (50 mL/Hr) IV Continuous <Continuous>    MEDICATIONS  (PRN):  dextrose 40% Gel 15 Gram(s) Oral once PRN Blood Glucose LESS THAN 70 milliGRAM(s)/deciliter  glucagon  Injectable 1 milliGRAM(s) IntraMuscular once PRN Glucose LESS THAN 70 milligrams/deciliter  heparin  Injectable 4000 Unit(s) IV Push every 6 hours PRN For aPTT less than 40  heparin  Injectable 2000 Unit(s) IV Push every 6 hours PRN For aPTT between 40 - 57      Allergies    No Known Allergies    Intolerances            Vital Signs Last 24 Hrs  T(C): 36.4 (2019 04:41), Max: 36.7 (2019 21:47)  T(F): 97.6 (2019 04:41), Max: 98 (2019 21:47)  HR: 71 (2019 07:21) (62 - 83)  BP: 123/59 (2019 07:21) (109/69 - 144/60)  BP(mean): --  RR: 18 (2019 07:21) (16 - 74)  SpO2: 99% (2019 07:21) (94% - 100%)    I&O's Summary    2019 07:01  -  2019 07:00  --------------------------------------------------------  IN: 120 mL / OUT: 1 mL / NET: 119 mL          PHYSICAL EXAM:  TELE: AF 70-80  Constitutional: NAD, awake    HEENT: Moist Mucous Membranes, Anicteric  Pulmonary: Decreased breath sounds b/l. No rales, crackles or wheeze appreciated.   Cardiovascular: IRRR, S1 and S2, No murmurs, rubs, gallops or clicks  Gastrointestinal: Bowel Sounds present, soft, nontender.   Lymph: No peripheral edema. No lymphadenopathy.  Skin: No visible rashes or ulcers. right groin no hematoma/bruit  Psych:  Mood & affect appropriate for situation    LABS: All Labs Reviewed:                        13.7   30.6  )-----------( 233      ( 2019 02:08 )             43.1                         14.1   33.4  )-----------( 229      ( 2019 02:15 )             45.2                         12.6   27.34 )-----------( 246      ( 15 Jul 2019 06:06 )             44.9     2019 02:15    136    |  100    |  47     ----------------------------<  211    4.2     |  24     |  1.33   15 Jul 2019 06:06    140    |  103    |  40     ----------------------------<  222    4.2     |  28     |  1.20     Ca    9.6        2019 02:15  Ca    9.0        15 Jul 2019 06:06    TPro  6.1    /  Alb  4.1    /  TBili  0.9    /  DBili  x      /  AST  13     /  ALT  10     /  AlkPhos  150    2019 02:15    PT/INR - ( 15 Jul 2019 17:18 )   PT: 15.0 sec;   INR: 1.31 ratio         PTT - ( 2019 02:08 )  PTT:145.2 sec       < from: Cardiac Cath Lab - Adult (07.15.19 @ 12:37) >    [facility]  Department of Cardiology  [address] [place] [address]  [phone]  Cath Lab Report -- Comprehensive Report  Patient: JOURDAN LARSON  Study date: 07/15/2019  Account number: [id number]  MR number: [id number]  : 1928  Gender: Female  Race: W  Case Physician(s):  Дмитрий Siddiqui M.D.  Fellow:  Nino Pat M.D.  Referring Physician:  Gume Jacome M.D.  INDICATIONS: Unstable angina - CCS4.  HISTORY: There was no prior cardiac history. The patient has hypertension.  PROCEDURE:  --  Right heart catheterization.  --  Left heart catheterization.  --  Left coronary angiography.  --  Right coronary angiography.  --  Sonosite - Diagnostic.  TECHNIQUE: The risks and alternatives of the procedures and conscious  sedation were explained to the patient and informed consent was obtained.  Cardiac catheterization performed electively.  Local anesthetic given. Right femoral artery access. Right femoral vein  access. Right heart catheterization. The procedure was performed utilizing  a catheter. Left heart catheterization. Left coronary artery angiography.  The vessel was injected utilizing a catheter. Right coronary artery  angiography. The vessel was injected utilizing a catheter. Sonosite -  Diagnostic. RADIATION EXPOSURE: 7.73 min.  CONTRAST GIVEN: Omnipaque 16 ml.  CORONARY VESSELS: The coronary circulation is left dominant.  LM:   --  LM: Normal.  LAD:   --  Proximal LAD: There was a 90 % stenosis.  CX:   --  Distal circumflex: There was a 50 % stenosis.  RCA:   --  Proximal RCA: There was a 100 % stenosis.  COMPLICATIONS: There were no complications.  DIAGNOSTIC RECOMMENDATIONS: Consider LAD PCI vs medical therapy  Prepared and signed by  Дмитрий Siddiqui M.D.  Signed 2019 08:42:41  HEMODYNAMIC TABLES  Pressures:  Baseline  Pressures:  - HR: 85  Pressures:  - Rhythm:  Pressures:  -- Aortic Pressure (S/D/M): 131/62/82  Pressures:  -- Pulmonary Artery (S/D/M): 40/12/21  Pressures:  -- Pulmonary Capillary Wedge: 9/10/7  Pressures:  -- Right Atrium (a/v/M): 3/5/2  Pressures:  -- Right Ventricle (s/edp): 35/3/--  O2 Sats:  Baseline  O2 Sats:  - HR: 85  O2 Sats:  - Rhythm:  O2 Sats:  -- AO: 13.3/95/17.18  O2 Sats:  -- Pa: 13.3/61.5/11.12  Outputs:  Baseline  Outputs:  -- CALCULATIONS[age]  Outputs:  -- CALCULATIONS: Body Surface Area: 1.56  Outputs:  -- CALCULATIONS: Height in cm: 160.00  Outputs:  -- CALCULATIONS: Sex: Female  Outputs:  -- CALCULATIONS: Weight in k.40  Outputs:  -- OUTPUTS: Blood Oxygen Difference: 6.06  Outputs:  -- OUTPUTS: CO by Tania: 3.45  Outputs:  -- OUTPUTS: Tania cardiac index: 2.22  Outputs:  -- OUTPUTS: O2 consumption: 209.00  Outputs:  -- OUTPUTS: Vo2 Indexed: 134.33  Outputs:  -- RESISTANCES: Left ventricular strokework: 45.69  Outputs:  -- RESISTANCES: Left Ventricular Stroke Work index: 29.37  Outputs:  -- RESISTANCES: Pulmonary vascular index (dsc): 505.10  Outputs:  -- RESISTANCES: Pulmonary vascular index (Wood Units): 6.32  Outputs:  -- RESISTANCES: Pulmonary vascular resistance (dsc): 324.64  Outputs:  -- RESISTANCES: Pulmonary vascular resistance (Wood Units): 4.06  Outputs:  -- RESISTANCES: PVR_SVR Ratio: 0.17  Outputs:  -- RESISTANCES: Right ventricular stroke work: 11.14  Outputs:  -- RESISTANCES: Right ventricular stroke work index: 7.16  Outputs:  -- RESISTANCES: Systemic vascular index (dsc): 2886.31  Outputs:  -- RESISTANCES: Systemic vascular index (Wood Units): 36.09  Outputs:  -- RESISTANCES: Systemic vascular resistance (dsc): 1855.07  Outputs:  -- RESISTANCES: Systemic vascular resistance (Wood Units): 23.19  Outputs:  -- RESISTANCES: Total pulmonary index (dsc): 757.66  Outputs:  -- RESISTANCES: Total pulmonary index (Wood Units): 9.47  Outputs:  -- RESISTANCES: Total pulmonary resistance (dsc): 486.96  Outputs:  -- RESISTANCES: Total pulmonary resistance (Wood Units): 6.09  Outputs:  -- RESISTANCES: Total vascular index (Wood Units): 36.99  Outputs:  -- RESISTANCES: Total vascular resistance (dsc): 1901.45  Outputs:  -- RESISTANCES: Total vascular resistance (Wood Units): 23.77  Outputs:  -- RESISTANCES: Total vascular resistance index (dsc): 2958.47  Outputs:  -- RESISTANCES: TPR_TVR Ratio: 0.26  Outputs:  -- SHUNTS: Qs Indexed: 2.22  Outputs:  -- SHUNTS: Systemic flow: 3.45    < end of copied text >  < from: Cardiac Cath Lab - Adult (07.15.19 @ 12:37) >    [facility]  Department of Cardiology  [address] [place] [address]  [phone]  Cath Lab Report -- Comprehensive Report  Patient: JOURDAN LARSON  Study date: 07/15/2019  Account number: [id number]  MR number: [id number]  : 1928  Gender: Female  Race: W  Case Physician(s):  Дмитрий Siddiqui M.D.  Fellow:  Nino Pat M.D.  Referring Physician:  Gume Jacome M.D.  INDICATIONS: Unstable angina - CCS4.  HISTORY: There was no prior cardiac history. The patient has hypertension.  PROCEDURE:  --  Right heart catheterization.  --  Left heart catheterization.  --  Left coronary angiography.  --  Right coronary angiography.  --  Sonosite - Diagnostic.  TECHNIQUE: The risks and alternatives of the procedures and conscious  sedation were explained to the patient and informed consent was obtained.  Cardiac catheterization performed electively.  Local anesthetic given. Right femoral artery access. Right femoral vein  access. Right heart catheterization. The procedure was performed utilizing  a catheter. Left heart catheterization. Left coronary artery angiography.  The vessel was injected utilizing a catheter. Right coronary artery  angiography. The vessel was injected utilizing a catheter. Sonosite -  Diagnostic. RADIATION EXPOSURE: 7.73 min.  CONTRAST GIVEN: Omnipaque 16 ml.  CORONARY VESSELS: The coronary circulation is left dominant.  LM:   --  LM: Normal.  LAD:   --  Proximal LAD: There was a 90 % stenosis.  CX:   --  Distal circumflex: There was a 50 % stenosis.  RCA:   --  Proximal RCA: There was a 100 % stenosis.  COMPLICATIONS: There were no complications.  DIAGNOSTIC RECOMMENDATIONS: Consider LAD PCI vs medical therapy  Prepared and signed by  Дмитрий Siddiqui M.D.  Signed 2019 08:42:41  HEMODYNAMIC TABLES  Pressures:  Baseline  Pressures:  - HR: 85  Pressures:  - Rhythm:  Pressures:  -- Aortic Pressure (S/D/M): 131/62/82  Pressures:  -- Pulmonary Artery (S/D/M): 40/12/21  Pressures:  -- Pulmonary Capillary Wedge: 9/10/7  Pressures:  -- Right Atrium (a/v/M): 3/5/2  Pressures:  -- Right Ventricle (s/edp): 35/3/--  O2 Sats:  Baseline  O2 Sats:  - HR: 85  O2 Sats:  - Rhythm:  O2 Sats:  -- AO: 13.3/95/17.18  O2 Sats:  -- Pa: 13.3/61.5/11.12  Outputs:  Baseline  Outputs:  -- CALCULATIONS[age]  Outputs:  -- CALCULATIONS: Body Surface Area: 1.56  Outputs:  -- CALCULATIONS: Height in cm: 160.00  Outputs:  -- CALCULATIONS: Sex: Female  Outputs:  -- CALCULATIONS: Weight in k.40  Outputs:  -- OUTPUTS: Blood Oxygen Difference: 6.06  Outputs:  -- OUTPUTS: CO by Tania: 3.45  Outputs:  -- OUTPUTS: Tania cardiac index: 2.22  Outputs:  -- OUTPUTS: O2 consumption: 209.00  Outputs:  -- OUTPUTS: Vo2 Indexed: 134.33  Outputs:  -- RESISTANCES: Left ventricular strokework: 45.69  Outputs:  -- RESISTANCES: Left Ventricular Stroke Work index: 29.37  Outputs:  -- RESISTANCES: Pulmonary vascular index (dsc): 505.10  Outputs:  -- RESISTANCES: Pulmonary vascular index (Wood Units): 6.32  Outputs:  -- RESISTANCES: Pulmonary vascular resistance (dsc): 324.64  Outputs:  -- RESISTANCES: Pulmonary vascular resistance (Wood Units): 4.06  Outputs:  -- RESISTANCES: PVR_SVR Ratio: 0.17  Outputs:  -- RESISTANCES: Right ventricular stroke work: 11.14  Outputs:  -- RESISTANCES: Right ventricular stroke work index: 7.16  Outputs:  -- RESISTANCES: Systemic vascular index (dsc): 2886.31  Outputs:  -- RESISTANCES: Systemic vascular index (Wood Units): 36.09  Outputs:  -- RESISTANCES: Systemic vascular resistance (dsc): 1855.07  Outputs:  -- RESISTANCES: Systemic vascular resistance (Wood Units): 23.19  Outputs:  -- RESISTANCES: Total pulmonary index (dsc): 757.66  Outputs:  -- RESISTANCES: Total pulmonary index (Wood Units): 9.47  Outputs:  -- RESISTANCES: Total pulmonary resistance (dsc): 486.96  Outputs:  -- RESISTANCES: Total pulmonary resistance (Wood Units): 6.09  Outputs:  -- RESISTANCES: Total vascular index (Wood Units): 36.99  Outputs:  -- RESISTANCES: Total vascular resistance (dsc): 1901.45  Outputs:  -- RESISTANCES: Total vascular resistance (Wood Units): 23.77  Outputs:  -- RESISTANCES: Total vascular resistance index (dsc): 2958.47  Outputs:  -- RESISTANCES: TPR_TVR Ratio: 0.26  Outputs:  -- SHUNTS: Qs Indexed: 2.22  Outputs:  -- SHUNTS: Systemic flow: 3.45    < end of copied text >
Patient is a 89y old  Female who presents with a chief complaint of MUNOZ        Allergies    No Known Allergies    Intolerances        Medications:  aspirin enteric coated 81 milliGRAM(s) Oral daily  atorvastatin 40 milliGRAM(s) Oral at bedtime  cholecalciferol 1000 Unit(s) Oral daily  dextrose 40% Gel 15 Gram(s) Oral once PRN  dextrose 5%. 1000 milliLiter(s) IV Continuous <Continuous>  dextrose 50% Injectable 12.5 Gram(s) IV Push once  dextrose 50% Injectable 25 Gram(s) IV Push once  dextrose 50% Injectable 25 Gram(s) IV Push once  furosemide    Tablet 40 milliGRAM(s) Oral daily  glucagon  Injectable 1 milliGRAM(s) IntraMuscular once PRN  heparin  Infusion.  Unit(s)/Hr IV Continuous <Continuous>  heparin  Injectable 4000 Unit(s) IV Push every 6 hours PRN  heparin  Injectable 2000 Unit(s) IV Push every 6 hours PRN  hydroxyurea 500 milliGRAM(s) Oral daily  insulin lispro (HumaLOG) corrective regimen sliding scale   SubCutaneous three times a day before meals  insulin lispro (HumaLOG) corrective regimen sliding scale   SubCutaneous at bedtime  lisinopril 20 milliGRAM(s) Oral daily  metoprolol succinate ER 25 milliGRAM(s) Oral daily  pantoprazole    Tablet 40 milliGRAM(s) Oral before breakfast      Vitals:  T(C): 36.7 (07-15-19 @ 21:39), Max: 36.8 (07-15-19 @ 08:04)  HR: 75 (07-15-19 @ 21:39) (66 - 80)  BP: 121/70 (07-15-19 @ 21:39) (110/56 - 140/82)  BP(mean): 99 (07-15-19 @ 11:08) (99 - 99)  RR: 18 (07-15-19 @ 21:39) (16 - 20)  SpO2: 98% (07-15-19 @ 21:39) (96% - 100%)  Wt(kg): --  Daily Height in cm: 160.02 (15 Jul 2019 11:10)    Daily Weight in k.4 (15 Jul 2019 11:08)  I&O's Summary        Physical Exam:  Appearance: Normal  Eyes: PERRL, EOMI  HENT: Normal oral muscosa, NC/AT  Cardiovascular: S1S2, RRR, No M/R/G, no JVD, No Lower extremity edema  Procedural Access Site: No hematoma, Non-tender to palpation, 2+ pulse, No bruit, No Ecchymosis  Respiratory: Clear to auscultation bilaterally  Gastrointestinal: Soft, Non tender, Normal Bowel Sounds  Musculoskeletal: No clubbing, No joint deformity   Neurologic: Non-focal  Lymphatic: No lymphadenopathy  Psychiatry: AAOx3, Mood & affect appropriate  Skin: No rashes, No ecchymoses, No cyanosis        136  |  100  |  47<H>  ----------------------------<  211<H>  4.2   |  24  |  1.33<H>    Ca    9.6      2019 02:15    TPro  6.1  /  Alb  4.1  /  TBili  0.9  /  DBili  x   /  AST  13  /  ALT  10  /  AlkPhos  150<H>      PT/INR - ( 15 Jul 2019 17:18 )   PT: 15.0 sec;   INR: 1.31 ratio         PTT - ( 2019 02:15 )  PTT:155.3 sec      Serum Pro-Brain Natriuretic Peptide: 2567 pg/mL ( @ 18:04)    Lipid panel   Hgb A1c                         14.1   33.4  )-----------( 229      ( 2019 02:15 )             45.2         ECG: Afib 65 bpm    Cath: prox % occlusion    Imaging:    Interpretation of Telemetry:

## 2019-07-19 NOTE — DISCHARGE NOTE NURSING/CASE MANAGEMENT/SOCIAL WORK - NSDCFUADDAPPT_GEN_ALL_CORE_FT
FOLLOW UP  MONDAY 7/22 for COUMADIN LEVEL in Dr REGALADO OFFICE  See Cardiologist In 1-2 weeks Dr Coley

## 2019-07-19 NOTE — DISCHARGE NOTE PROVIDER - NSDCFUADDAPPT_GEN_ALL_CORE_FT
FOLLOW UP  MONDAY 7/22 for COUMADIN LEVEL in Dr HOUSTON's OFFICE FOLLOW UP  MONDAY 7/22 for COUMADIN LEVEL in Dr REGALADO OFFICE  See Cardiologist In 1-2 weeks Dr Coley

## 2019-07-21 ENCOUNTER — RX RENEWAL (OUTPATIENT)
Age: 84
End: 2019-07-21

## 2019-07-22 ENCOUNTER — APPOINTMENT (OUTPATIENT)
Dept: INTERNAL MEDICINE | Facility: CLINIC | Age: 84
End: 2019-07-22
Payer: MEDICARE

## 2019-07-22 VITALS
TEMPERATURE: 98.3 F | HEIGHT: 63 IN | RESPIRATION RATE: 16 BRPM | HEART RATE: 78 BPM | SYSTOLIC BLOOD PRESSURE: 160 MMHG | OXYGEN SATURATION: 98 % | WEIGHT: 124 LBS | BODY MASS INDEX: 21.97 KG/M2 | DIASTOLIC BLOOD PRESSURE: 62 MMHG

## 2019-07-22 LAB
INR PPP: 2.2 RATIO
POCT-PROTHROMBIN TIME: 26.2 SECS
QUALITY CONTROL: YES

## 2019-07-22 PROCEDURE — 99495 TRANSJ CARE MGMT MOD F2F 14D: CPT

## 2019-07-22 NOTE — PHYSICAL EXAM
[No Acute Distress] : no acute distress [Well Nourished] : well nourished [Well-Appearing] : well-appearing [Well Developed] : well developed [No Respiratory Distress] : no respiratory distress  [Clear to Auscultation] : lungs were clear to auscultation bilaterally [No Accessory Muscle Use] : no accessory muscle use [Normal Rate] : normal rate  [Regular Rhythm] : with a regular rhythm [Normal S1, S2] : normal S1 and S2 [No Murmur] : no murmur heard [No Abdominal Bruit] : a ~M bruit was not heard ~T in the abdomen [No Varicosities] : no varicosities [No Edema] : there was no peripheral edema [Pedal Pulses Present] : the pedal pulses are present [No Palpable Aorta] : no palpable aorta [No Extremity Clubbing/Cyanosis] : no extremity clubbing/cyanosis [Soft] : abdomen soft [Non Tender] : non-tender [Non-distended] : non-distended [No Masses] : no abdominal mass palpated [No HSM] : no HSM [Normal Bowel Sounds] : normal bowel sounds [Coordination Grossly Intact] : coordination grossly intact [Deep Tendon Reflexes (DTR)] : deep tendon reflexes were 2+ and symmetric [Normal Affect] : the affect was normal [Normal Insight/Judgement] : insight and judgment were intact

## 2019-07-22 NOTE — HEALTH RISK ASSESSMENT
[Change in mental status noted] : No change in mental status noted [None] : None [Language] : difficulty with language [Reports changes in hearing] : Reports no changes in hearing

## 2019-07-22 NOTE — DISCUSSION/SUMMARY
[Follow Up Appt with Provider within 7 days] : follow up appt with provider within 7 days [Home] : patient was discharged to home

## 2019-07-22 NOTE — HISTORY OF PRESENT ILLNESS
[Post-hospitalization from ___ Hospital] : Post-hospitalization from [unfilled] Hospital [Admitted on: ___] : The patient was admitted on [unfilled] [Discharged on ___] : discharged on [unfilled] [Patient Contacted By: ____] : and contacted by [unfilled] [FreeTextEntry2] : Pt is here for hospital f/u. She has been having shortness of breath. She was sent to [place] and transferred to [place]. She came home 3 days ago. She is doing better than she was. c/o feeling weak since in the hospital. \par On warfarin 3 4 days a week and 2 3 days a week.

## 2019-07-23 ENCOUNTER — RX RENEWAL (OUTPATIENT)
Age: 84
End: 2019-07-23

## 2019-07-29 ENCOUNTER — MEDICATION RENEWAL (OUTPATIENT)
Age: 84
End: 2019-07-29

## 2019-08-05 ENCOUNTER — APPOINTMENT (OUTPATIENT)
Dept: CARDIOLOGY | Facility: CLINIC | Age: 84
End: 2019-08-05
Payer: MEDICARE

## 2019-08-05 ENCOUNTER — NON-APPOINTMENT (OUTPATIENT)
Age: 84
End: 2019-08-05

## 2019-08-05 VITALS
BODY MASS INDEX: 20.38 KG/M2 | WEIGHT: 115 LBS | HEIGHT: 63 IN | HEART RATE: 78 BPM | SYSTOLIC BLOOD PRESSURE: 134 MMHG | OXYGEN SATURATION: 94 % | DIASTOLIC BLOOD PRESSURE: 77 MMHG

## 2019-08-05 DIAGNOSIS — Z95.5 PRESENCE OF CORONARY ANGIOPLASTY IMPLANT AND GRAFT: ICD-10-CM

## 2019-08-05 LAB
INR PPP: 3.4 RATIO
QUALITY CONTROL: YES

## 2019-08-05 PROCEDURE — 99215 OFFICE O/P EST HI 40 MIN: CPT

## 2019-08-05 PROCEDURE — 93000 ELECTROCARDIOGRAM COMPLETE: CPT

## 2019-09-11 ENCOUNTER — RX RENEWAL (OUTPATIENT)
Age: 84
End: 2019-09-11

## 2019-09-16 ENCOUNTER — RX RENEWAL (OUTPATIENT)
Age: 84
End: 2019-09-16

## 2019-09-21 ENCOUNTER — RX RENEWAL (OUTPATIENT)
Age: 84
End: 2019-09-21

## 2019-09-28 ENCOUNTER — APPOINTMENT (OUTPATIENT)
Dept: INTERNAL MEDICINE | Facility: CLINIC | Age: 84
End: 2019-09-28
Payer: MEDICARE

## 2019-09-28 VITALS
HEART RATE: 76 BPM | BODY MASS INDEX: 20.55 KG/M2 | DIASTOLIC BLOOD PRESSURE: 74 MMHG | HEIGHT: 63 IN | RESPIRATION RATE: 14 BRPM | SYSTOLIC BLOOD PRESSURE: 116 MMHG | WEIGHT: 116 LBS | OXYGEN SATURATION: 98 %

## 2019-09-28 DIAGNOSIS — R09.82 POSTNASAL DRIP: ICD-10-CM

## 2019-09-28 LAB
INR PPP: 1.3 RATIO
POCT-PROTHROMBIN TIME: 15 SECS
QUALITY CONTROL: YES

## 2019-09-28 PROCEDURE — 99214 OFFICE O/P EST MOD 30 MIN: CPT

## 2019-09-28 RX ORDER — FLUTICASONE PROPIONATE 50 UG/1
50 SPRAY, METERED NASAL DAILY
Qty: 1 | Refills: 2 | Status: ACTIVE | COMMUNITY
Start: 2019-09-28 | End: 1900-01-01

## 2019-09-28 NOTE — PHYSICAL EXAM
[Normal Affect] : the affect was normal [Normal Insight/Judgement] : insight and judgment were intact [de-identified] : tachypneic [Normal] : affect was normal and insight and judgment were intact

## 2019-09-28 NOTE — HISTORY OF PRESENT ILLNESS
[Family Member] : family member [FreeTextEntry1] : trip resendiz mucus [de-identified] : c/o feeling like she has a bunch of mucus in her throat and coughing all night. Has been about a week. Has occasional chest pain across chest which she states is from the mucus.

## 2019-10-04 ENCOUNTER — NON-APPOINTMENT (OUTPATIENT)
Age: 84
End: 2019-10-04

## 2019-10-04 ENCOUNTER — APPOINTMENT (OUTPATIENT)
Dept: CARDIOLOGY | Facility: CLINIC | Age: 84
End: 2019-10-04
Payer: MEDICARE

## 2019-10-04 VITALS — HEART RATE: 74 BPM | DIASTOLIC BLOOD PRESSURE: 78 MMHG | OXYGEN SATURATION: 92 % | SYSTOLIC BLOOD PRESSURE: 148 MMHG

## 2019-10-04 PROCEDURE — 93000 ELECTROCARDIOGRAM COMPLETE: CPT

## 2019-10-04 PROCEDURE — 99215 OFFICE O/P EST HI 40 MIN: CPT

## 2019-10-07 ENCOUNTER — LABORATORY RESULT (OUTPATIENT)
Age: 84
End: 2019-10-07

## 2019-10-08 LAB
BASOPHILS # BLD AUTO: 0 K/UL
BASOPHILS NFR BLD AUTO: 0 %
EOSINOPHIL # BLD AUTO: 0.22 K/UL
EOSINOPHIL NFR BLD AUTO: 0.9 %
HCT VFR BLD CALC: 45.2 %
HGB BLD-MCNC: 12 G/DL
LYMPHOCYTES # BLD AUTO: 2.59 K/UL
LYMPHOCYTES NFR BLD AUTO: 10.4 %
MAN DIFF?: NORMAL
MCHC RBC-ENTMCNC: 23.3 PG
MCHC RBC-ENTMCNC: 26.5 GM/DL
MCV RBC AUTO: 87.6 FL
MONOCYTES # BLD AUTO: 1.07 K/UL
MONOCYTES NFR BLD AUTO: 4.3 %
NEUTROPHILS # BLD AUTO: 20.99 K/UL
NEUTROPHILS NFR BLD AUTO: 84.4 %
PLATELET # BLD AUTO: 493 K/UL
RBC # BLD: 5.16 M/UL
RBC # FLD: 18.6 %
WBC # FLD AUTO: 24.87 K/UL

## 2019-10-22 ENCOUNTER — RX RENEWAL (OUTPATIENT)
Age: 84
End: 2019-10-22

## 2019-12-03 ENCOUNTER — RX RENEWAL (OUTPATIENT)
Age: 84
End: 2019-12-03

## 2019-12-13 ENCOUNTER — APPOINTMENT (OUTPATIENT)
Dept: INTERNAL MEDICINE | Facility: CLINIC | Age: 84
End: 2019-12-13
Payer: MEDICARE

## 2019-12-13 ENCOUNTER — LABORATORY RESULT (OUTPATIENT)
Age: 84
End: 2019-12-13

## 2019-12-13 VITALS
HEIGHT: 63 IN | OXYGEN SATURATION: 96 % | WEIGHT: 118 LBS | RESPIRATION RATE: 14 BRPM | HEART RATE: 76 BPM | SYSTOLIC BLOOD PRESSURE: 140 MMHG | BODY MASS INDEX: 20.91 KG/M2 | DIASTOLIC BLOOD PRESSURE: 60 MMHG

## 2019-12-13 PROCEDURE — G0008: CPT

## 2019-12-13 PROCEDURE — 90662 IIV NO PRSV INCREASED AG IM: CPT

## 2019-12-13 PROCEDURE — 99214 OFFICE O/P EST MOD 30 MIN: CPT | Mod: 25

## 2019-12-13 NOTE — REVIEW OF SYSTEMS
[Fatigue] : fatigue [Shortness Of Breath] : shortness of breath [Insomnia] : insomnia [Negative] : Neurological

## 2019-12-13 NOTE — HISTORY OF PRESENT ILLNESS
[FreeTextEntry1] : fatigue [de-identified] : c/o fatigue, not sleeping at night and sleeps during the day.

## 2019-12-14 ENCOUNTER — RX RENEWAL (OUTPATIENT)
Age: 84
End: 2019-12-14

## 2019-12-14 DIAGNOSIS — E53.8 DEFICIENCY OF OTHER SPECIFIED B GROUP VITAMINS: ICD-10-CM

## 2019-12-14 LAB
ALBUMIN SERPL ELPH-MCNC: 4.3 G/DL
ALP BLD-CCNC: 198 U/L
ALT SERPL-CCNC: 17 U/L
ANION GAP SERPL CALC-SCNC: 12 MMOL/L
AST SERPL-CCNC: 14 U/L
BASOPHILS # BLD AUTO: 0.29 K/UL
BASOPHILS NFR BLD AUTO: 1.1 %
BILIRUB SERPL-MCNC: 0.8 MG/DL
BUN SERPL-MCNC: 20 MG/DL
CALCIUM SERPL-MCNC: 9.2 MG/DL
CHLORIDE SERPL-SCNC: 106 MMOL/L
CHOLEST SERPL-MCNC: 99 MG/DL
CHOLEST/HDLC SERPL: 2.9 RATIO
CO2 SERPL-SCNC: 24 MMOL/L
CREAT SERPL-MCNC: 0.82 MG/DL
EOSINOPHIL # BLD AUTO: 0.16 K/UL
EOSINOPHIL NFR BLD AUTO: 0.6 %
ESTIMATED AVERAGE GLUCOSE: 137 MG/DL
FERRITIN SERPL-MCNC: 66 NG/ML
FOLATE SERPL-MCNC: 3.9 NG/ML
GLUCOSE SERPL-MCNC: 161 MG/DL
HBA1C MFR BLD HPLC: 6.4 %
HCT VFR BLD CALC: 42.2 %
HDLC SERPL-MCNC: 34 MG/DL
HGB BLD-MCNC: 11.4 G/DL
IMM GRANULOCYTES NFR BLD AUTO: 1.7 %
INR PPP: 2.5 RATIO
IRON SATN MFR SERPL: 6 %
IRON SERPL-MCNC: 21 UG/DL
LDLC SERPL CALC-MCNC: 52 MG/DL
LYMPHOCYTES # BLD AUTO: 1.4 K/UL
LYMPHOCYTES NFR BLD AUTO: 5.4 %
MAN DIFF?: NORMAL
MCHC RBC-ENTMCNC: 22.6 PG
MCHC RBC-ENTMCNC: 27 GM/DL
MCV RBC AUTO: 83.7 FL
MONOCYTES # BLD AUTO: 0.88 K/UL
MONOCYTES NFR BLD AUTO: 3.4 %
NEUTROPHILS # BLD AUTO: 22.74 K/UL
NEUTROPHILS NFR BLD AUTO: 87.8 %
PLATELET # BLD AUTO: 287 K/UL
POCT-PROTHROMBIN TIME: 29.8 SECS
POTASSIUM SERPL-SCNC: 4.6 MMOL/L
PROT SERPL-MCNC: 6.1 G/DL
QUALITY CONTROL: YES
RBC # BLD: 5.04 M/UL
RBC # BLD: 5.04 M/UL
RBC # FLD: 18.7 %
RETICS # AUTO: 1.6 %
RETICS AGGREG/RBC NFR: 79.6 K/UL
SODIUM SERPL-SCNC: 142 MMOL/L
TIBC SERPL-MCNC: 373 UG/DL
TRIGL SERPL-MCNC: 64 MG/DL
TSH SERPL-ACNC: 3.38 UIU/ML
UIBC SERPL-MCNC: 352 UG/DL
VIT B12 SERPL-MCNC: 1390 PG/ML
WBC # FLD AUTO: 25.92 K/UL

## 2019-12-21 ENCOUNTER — RX RENEWAL (OUTPATIENT)
Age: 84
End: 2019-12-21

## 2020-01-05 ENCOUNTER — FORM ENCOUNTER (OUTPATIENT)
Age: 85
End: 2020-01-05

## 2020-01-06 ENCOUNTER — APPOINTMENT (OUTPATIENT)
Dept: CARDIOLOGY | Facility: CLINIC | Age: 85
End: 2020-01-06
Payer: MEDICARE

## 2020-01-06 ENCOUNTER — APPOINTMENT (OUTPATIENT)
Dept: RADIOLOGY | Facility: CLINIC | Age: 85
End: 2020-01-06
Payer: MEDICARE

## 2020-01-06 ENCOUNTER — NON-APPOINTMENT (OUTPATIENT)
Age: 85
End: 2020-01-06

## 2020-01-06 ENCOUNTER — OUTPATIENT (OUTPATIENT)
Dept: OUTPATIENT SERVICES | Facility: HOSPITAL | Age: 85
LOS: 1 days | End: 2020-01-06
Payer: COMMERCIAL

## 2020-01-06 VITALS
BODY MASS INDEX: 20.55 KG/M2 | WEIGHT: 116 LBS | SYSTOLIC BLOOD PRESSURE: 163 MMHG | HEIGHT: 63 IN | OXYGEN SATURATION: 95 % | DIASTOLIC BLOOD PRESSURE: 89 MMHG | HEART RATE: 96 BPM

## 2020-01-06 VITALS — SYSTOLIC BLOOD PRESSURE: 140 MMHG | DIASTOLIC BLOOD PRESSURE: 60 MMHG

## 2020-01-06 DIAGNOSIS — Z98.49 CATARACT EXTRACTION STATUS, UNSPECIFIED EYE: Chronic | ICD-10-CM

## 2020-01-06 DIAGNOSIS — Z00.8 ENCOUNTER FOR OTHER GENERAL EXAMINATION: ICD-10-CM

## 2020-01-06 DIAGNOSIS — Z96.7 PRESENCE OF OTHER BONE AND TENDON IMPLANTS: Chronic | ICD-10-CM

## 2020-01-06 PROCEDURE — 93000 ELECTROCARDIOGRAM COMPLETE: CPT

## 2020-01-06 PROCEDURE — 71046 X-RAY EXAM CHEST 2 VIEWS: CPT

## 2020-01-06 PROCEDURE — 71046 X-RAY EXAM CHEST 2 VIEWS: CPT | Mod: 26

## 2020-01-06 PROCEDURE — 99215 OFFICE O/P EST HI 40 MIN: CPT

## 2020-01-06 RX ORDER — METOPROLOL SUCCINATE 25 MG/1
25 TABLET, EXTENDED RELEASE ORAL
Qty: 30 | Refills: 0 | Status: DISCONTINUED | COMMUNITY
Start: 2019-07-19 | End: 2020-01-06

## 2020-01-06 RX ORDER — CLOPIDOGREL BISULFATE 75 MG/1
75 TABLET, FILM COATED ORAL
Qty: 30 | Refills: 2 | Status: DISCONTINUED | COMMUNITY
Start: 2019-07-22 | End: 2020-01-06

## 2020-01-06 RX ORDER — CEFUROXIME AXETIL 250 MG/1
250 TABLET ORAL
Qty: 14 | Refills: 0 | Status: DISCONTINUED | COMMUNITY
Start: 2019-09-28 | End: 2020-01-06

## 2020-01-09 ENCOUNTER — RX RENEWAL (OUTPATIENT)
Age: 85
End: 2020-01-09

## 2020-02-14 ENCOUNTER — RX RENEWAL (OUTPATIENT)
Age: 85
End: 2020-02-14

## 2020-03-06 ENCOUNTER — LABORATORY RESULT (OUTPATIENT)
Age: 85
End: 2020-03-06

## 2020-03-06 ENCOUNTER — APPOINTMENT (OUTPATIENT)
Dept: INTERNAL MEDICINE | Facility: CLINIC | Age: 85
End: 2020-03-06
Payer: MEDICARE

## 2020-03-06 VITALS
OXYGEN SATURATION: 98 % | BODY MASS INDEX: 21.16 KG/M2 | TEMPERATURE: 97.3 F | WEIGHT: 115 LBS | RESPIRATION RATE: 14 BRPM | HEIGHT: 62 IN | HEART RATE: 65 BPM

## 2020-03-06 VITALS — DIASTOLIC BLOOD PRESSURE: 70 MMHG | SYSTOLIC BLOOD PRESSURE: 130 MMHG

## 2020-03-06 DIAGNOSIS — Z87.09 PERSONAL HISTORY OF OTHER DISEASES OF THE RESPIRATORY SYSTEM: ICD-10-CM

## 2020-03-06 DIAGNOSIS — Z87.2 PERSONAL HISTORY OF DISEASES OF THE SKIN AND SUBCUTANEOUS TISSUE: ICD-10-CM

## 2020-03-06 DIAGNOSIS — J22 UNSPECIFIED ACUTE LOWER RESPIRATORY INFECTION: ICD-10-CM

## 2020-03-06 LAB
INR PPP: 2.9 RATIO
POCT-PROTHROMBIN TIME: 35.2 SECS
QUALITY CONTROL: YES

## 2020-03-06 PROCEDURE — 99214 OFFICE O/P EST MOD 30 MIN: CPT | Mod: 25

## 2020-03-06 PROCEDURE — 36415 COLL VENOUS BLD VENIPUNCTURE: CPT

## 2020-03-06 PROCEDURE — 85610 PROTHROMBIN TIME: CPT | Mod: QW

## 2020-03-06 NOTE — HISTORY OF PRESENT ILLNESS
[Other: _____] : [unfilled] [FreeTextEntry1] : DM [de-identified] : Taking 3 mg 3 days a week and 2 mg 4 days a week. c/o feeling tired. Doesn't sleep at night but son says she sleeps on couch during the day. \par Her metoprolol was increased to bid.

## 2020-03-07 LAB
ALBUMIN SERPL ELPH-MCNC: 4.3 G/DL
ALP BLD-CCNC: 189 U/L
ALT SERPL-CCNC: 18 U/L
ANION GAP SERPL CALC-SCNC: 14 MMOL/L
AST SERPL-CCNC: 15 U/L
BASOPHILS # BLD AUTO: 0.29 K/UL
BASOPHILS NFR BLD AUTO: 1.2 %
BILIRUB SERPL-MCNC: 0.9 MG/DL
BUN SERPL-MCNC: 23 MG/DL
CALCIUM SERPL-MCNC: 9.6 MG/DL
CHLORIDE SERPL-SCNC: 104 MMOL/L
CHOLEST SERPL-MCNC: 118 MG/DL
CHOLEST/HDLC SERPL: 4.1 RATIO
CO2 SERPL-SCNC: 24 MMOL/L
CREAT SERPL-MCNC: 0.87 MG/DL
EOSINOPHIL # BLD AUTO: 0.26 K/UL
EOSINOPHIL NFR BLD AUTO: 1.1 %
ESTIMATED AVERAGE GLUCOSE: 154 MG/DL
FERRITIN SERPL-MCNC: 69 NG/ML
FOLATE SERPL-MCNC: 17.9 NG/ML
GLUCOSE SERPL-MCNC: 146 MG/DL
HBA1C MFR BLD HPLC: 7 %
HCT VFR BLD CALC: 44.6 %
HDLC SERPL-MCNC: 29 MG/DL
HGB BLD-MCNC: 11.3 G/DL
IMM GRANULOCYTES NFR BLD AUTO: 2.1 %
IRON SATN MFR SERPL: 5 %
IRON SERPL-MCNC: 21 UG/DL
LDLC SERPL CALC-MCNC: 71 MG/DL
LYMPHOCYTES # BLD AUTO: 1.5 K/UL
LYMPHOCYTES NFR BLD AUTO: 6.4 %
MAN DIFF?: NORMAL
MCHC RBC-ENTMCNC: 21.6 PG
MCHC RBC-ENTMCNC: 25.3 GM/DL
MCV RBC AUTO: 85.4 FL
MONOCYTES # BLD AUTO: 0.81 K/UL
MONOCYTES NFR BLD AUTO: 3.5 %
NEUTROPHILS # BLD AUTO: 19.98 K/UL
NEUTROPHILS NFR BLD AUTO: 85.7 %
PLATELET # BLD AUTO: 417 K/UL
POTASSIUM SERPL-SCNC: 5.2 MMOL/L
PROT SERPL-MCNC: 6.2 G/DL
RBC # BLD: 5.22 M/UL
RBC # FLD: 20 %
SODIUM SERPL-SCNC: 142 MMOL/L
TIBC SERPL-MCNC: 392 UG/DL
TRIGL SERPL-MCNC: 88 MG/DL
UIBC SERPL-MCNC: 371 UG/DL
VIT B12 SERPL-MCNC: 1390 PG/ML
WBC # FLD AUTO: 23.32 K/UL

## 2020-03-08 ENCOUNTER — RX RENEWAL (OUTPATIENT)
Age: 85
End: 2020-03-08

## 2020-03-30 ENCOUNTER — RX RENEWAL (OUTPATIENT)
Age: 85
End: 2020-03-30

## 2020-04-11 ENCOUNTER — RX RENEWAL (OUTPATIENT)
Age: 85
End: 2020-04-11

## 2020-04-13 ENCOUNTER — RX RENEWAL (OUTPATIENT)
Age: 85
End: 2020-04-13

## 2020-04-21 ENCOUNTER — APPOINTMENT (OUTPATIENT)
Dept: INTERNAL MEDICINE | Facility: CLINIC | Age: 85
End: 2020-04-21
Payer: MEDICARE

## 2020-04-21 DIAGNOSIS — R68.89 OTHER GENERAL SYMPTOMS AND SIGNS: ICD-10-CM

## 2020-04-21 PROCEDURE — 99213 OFFICE O/P EST LOW 20 MIN: CPT | Mod: 95

## 2020-04-21 NOTE — PHYSICAL EXAM
[Ill-Appearing] : ill-appearing [Memory Grossly Normal] : memory grossly normal [Normal Insight/Judgement] : insight and judgment were intact [de-identified] : lethargic

## 2020-04-21 NOTE — PLAN
[FreeTextEntry1] : Advised [facility] to take [facility]

## 2020-04-21 NOTE — HISTORY OF PRESENT ILLNESS
[Home] : at home, [unfilled] , at the time of the visit. [Medical Office: ([facility])___] : at the medical office located in  [Family Member] : family member [Patient] : the patient [FreeTextEntry2] : Chuy [FreeTextEntry3] : Son [FreeTextEntry8] : worsening condition  for 24 hours \par lethargic chills cough\par chills\par always SOB\par poor Appetite \par unable to get Temperaure

## 2020-04-29 ENCOUNTER — RX RENEWAL (OUTPATIENT)
Age: 85
End: 2020-04-29

## 2020-05-07 ENCOUNTER — LABORATORY RESULT (OUTPATIENT)
Age: 85
End: 2020-05-07

## 2020-05-07 DIAGNOSIS — D47.3 ESSENTIAL (HEMORRHAGIC) THROMBOCYTHEMIA: ICD-10-CM

## 2020-05-08 ENCOUNTER — APPOINTMENT (OUTPATIENT)
Dept: INTERNAL MEDICINE | Facility: CLINIC | Age: 85
End: 2020-05-08
Payer: MEDICARE

## 2020-05-08 DIAGNOSIS — R06.89 OTHER ABNORMALITIES OF BREATHING: ICD-10-CM

## 2020-05-08 PROCEDURE — 99495 TRANSJ CARE MGMT MOD F2F 14D: CPT | Mod: 95

## 2020-05-08 RX ORDER — METOPROLOL SUCCINATE 50 MG/1
50 TABLET, EXTENDED RELEASE ORAL TWICE DAILY
Qty: 180 | Refills: 2 | Status: DISCONTINUED | COMMUNITY
Start: 2019-06-18 | End: 2020-05-08

## 2020-05-08 NOTE — REVIEW OF SYSTEMS
[Shortness Of Breath] : shortness of breath [Negative] : Neurological [FreeTextEntry6] : much better

## 2020-05-08 NOTE — HISTORY OF PRESENT ILLNESS
[Post-hospitalization from ___ Hospital] : Post-hospitalization from [unfilled] Hospital [Admitted on: ___] : The patient was admitted on [unfilled] [Discharged on ___] : discharged on [unfilled] [Discharge Summary] : discharge summary [Home] : at home, [unfilled] , at the time of the visit. [Medical Office: ([facility])___] : at the medical office located in  [Other:____] : [unfilled] [FreeTextEntry2] : Pt was hospitalized in [facility] for 8 days. She was in for severe shortness of breath. No cough. COVID was negative. She required a PPM. She is doing much better at home. \par Her right leg is swollen today. her foot and ankle are swollen. There is no pain. \par She has A fib and on warfarin.

## 2020-05-08 NOTE — HISTORY OF PRESENT ILLNESS
[Post-hospitalization from ___ Hospital] : Post-hospitalization from [unfilled] Hospital [Admitted on: ___] : The patient was admitted on [unfilled] [Discharged on ___] : discharged on [unfilled] [Discharge Summary] : discharge summary [Home] : at home, [unfilled] , at the time of the visit. [Medical Office: ([facility])___] : at the medical office located in  [Other:____] : [unfilled] [FreeTextEntry2] : Pt was hospitalized in [facility] for 8 days. She was in for severe shortness of breath. No cough. COVID was negative. She required a PPM. She is doing much better at home. \par Her right leg is swollen today. her foot and ankle are swollen. There is no pain. \par She has A fib and on warfarin.

## 2020-05-12 ENCOUNTER — LABORATORY RESULT (OUTPATIENT)
Age: 85
End: 2020-05-12

## 2020-05-12 ENCOUNTER — RX RENEWAL (OUTPATIENT)
Age: 85
End: 2020-05-12

## 2020-05-13 LAB
ALBUMIN SERPL ELPH-MCNC: 4 G/DL
ALP BLD-CCNC: 230 U/L
ALT SERPL-CCNC: 19 U/L
ANION GAP SERPL CALC-SCNC: 21 MMOL/L
AST SERPL-CCNC: 22 U/L
BASOPHILS # BLD AUTO: 0.44 K/UL
BASOPHILS NFR BLD AUTO: 1 %
BILIRUB SERPL-MCNC: 1 MG/DL
BUN SERPL-MCNC: 13 MG/DL
CALCIUM SERPL-MCNC: 8.9 MG/DL
CHLORIDE SERPL-SCNC: 98 MMOL/L
CHOLEST SERPL-MCNC: 90 MG/DL
CHOLEST/HDLC SERPL: 2.8 RATIO
CO2 SERPL-SCNC: 22 MMOL/L
CREAT SERPL-MCNC: 0.93 MG/DL
EOSINOPHIL # BLD AUTO: 0.24 K/UL
EOSINOPHIL NFR BLD AUTO: 0.6 %
ESTIMATED AVERAGE GLUCOSE: 177 MG/DL
GLUCOSE SERPL-MCNC: 284 MG/DL
HBA1C MFR BLD HPLC: 7.8 %
HCT VFR BLD CALC: 40 %
HDLC SERPL-MCNC: 32 MG/DL
HGB BLD-MCNC: 9.7 G/DL
IMM GRANULOCYTES NFR BLD AUTO: 3.6 %
INR PPP: 5.04 RATIO
LDLC SERPL CALC-MCNC: 39 MG/DL
LYMPHOCYTES # BLD AUTO: 1.27 K/UL
LYMPHOCYTES NFR BLD AUTO: 3 %
MAN DIFF?: NORMAL
MCHC RBC-ENTMCNC: 20.1 PG
MCHC RBC-ENTMCNC: 24.3 GM/DL
MCV RBC AUTO: 83 FL
MONOCYTES # BLD AUTO: 1.28 K/UL
MONOCYTES NFR BLD AUTO: 3 %
NEUTROPHILS # BLD AUTO: 37.79 K/UL
NEUTROPHILS NFR BLD AUTO: 88.8 %
PLATELET # BLD AUTO: 1170 K/UL
POTASSIUM SERPL-SCNC: 5.8 MMOL/L
PROT SERPL-MCNC: 6 G/DL
PT BLD: 59.9 SEC
RBC # BLD: 4.82 M/UL
RBC # FLD: 20.4 %
SODIUM SERPL-SCNC: 140 MMOL/L
TRIGL SERPL-MCNC: 95 MG/DL
WBC # FLD AUTO: 42.54 K/UL

## 2020-05-13 RX ORDER — AMLODIPINE BESYLATE 2.5 MG/1
2.5 TABLET ORAL DAILY
Qty: 30 | Refills: 2 | Status: ACTIVE | COMMUNITY
Start: 2020-05-08 | End: 1900-01-01

## 2020-05-20 ENCOUNTER — APPOINTMENT (OUTPATIENT)
Dept: CARDIOLOGY | Facility: CLINIC | Age: 85
End: 2020-05-20
Payer: MEDICARE

## 2020-05-20 PROCEDURE — 99215 OFFICE O/P EST HI 40 MIN: CPT | Mod: 95

## 2020-05-20 RX ORDER — GLIMEPIRIDE 1 MG/1
1 TABLET ORAL
Qty: 30 | Refills: 0 | Status: DISCONTINUED | COMMUNITY
Start: 2019-07-21 | End: 2020-05-20

## 2020-05-20 RX ORDER — GLIMEPIRIDE 2 MG/1
2 TABLET ORAL
Qty: 30 | Refills: 0 | Status: DISCONTINUED | COMMUNITY
Start: 2019-07-29 | End: 2020-05-20

## 2020-05-28 ENCOUNTER — APPOINTMENT (OUTPATIENT)
Dept: INTERNAL MEDICINE | Facility: CLINIC | Age: 85
End: 2020-05-28
Payer: MEDICARE

## 2020-05-28 ENCOUNTER — LABORATORY RESULT (OUTPATIENT)
Age: 85
End: 2020-05-28

## 2020-05-28 VITALS
WEIGHT: 110 LBS | HEART RATE: 68 BPM | BODY MASS INDEX: 20.24 KG/M2 | RESPIRATION RATE: 14 BRPM | SYSTOLIC BLOOD PRESSURE: 132 MMHG | HEIGHT: 62 IN | DIASTOLIC BLOOD PRESSURE: 74 MMHG | TEMPERATURE: 97.3 F | OXYGEN SATURATION: 97 %

## 2020-05-28 DIAGNOSIS — M25.473 EFFUSION, UNSPECIFIED ANKLE: ICD-10-CM

## 2020-05-28 LAB
INR PPP: 2.7 RATIO
POCT-PROTHROMBIN TIME: 32.8 SECS
QUALITY CONTROL: YES

## 2020-05-28 PROCEDURE — 99214 OFFICE O/P EST MOD 30 MIN: CPT

## 2020-05-28 NOTE — HISTORY OF PRESENT ILLNESS
[Other: _____] : [unfilled] [FreeTextEntry1] : A fib [de-identified] : Pt is here with her son. She needs repeat bw for her myeloproliferative disorder. Has very high WBC's which was lower when on hydroxyurea. Hydroxyurea was d/c'd when she was in [facility] and now WBC's very high again. \par She c/o swelling of her right leg which is better than it was . No pain.

## 2020-05-28 NOTE — PHYSICAL EXAM
[Regular Rhythm] : with a regular rhythm [Normal Rate] : normal rate  [Normal] : affect was normal and insight and judgment were intact [de-identified] : 2/6 systolic murmur [de-identified] : 1-2+ edema right ankle, left no edema. no calf tenderness or edema

## 2020-05-29 LAB
ALBUMIN SERPL ELPH-MCNC: 4.2 G/DL
ALP BLD-CCNC: 241 U/L
ALT SERPL-CCNC: 18 U/L
ANION GAP SERPL CALC-SCNC: 16 MMOL/L
AST SERPL-CCNC: 19 U/L
BASOPHILS # BLD AUTO: 0.39 K/UL
BASOPHILS NFR BLD AUTO: 1.1 %
BILIRUB SERPL-MCNC: 0.9 MG/DL
BUN SERPL-MCNC: 22 MG/DL
CALCIUM SERPL-MCNC: 9.8 MG/DL
CHLORIDE SERPL-SCNC: 100 MMOL/L
CO2 SERPL-SCNC: 25 MMOL/L
CREAT SERPL-MCNC: 0.93 MG/DL
EOSINOPHIL # BLD AUTO: 0.19 K/UL
EOSINOPHIL NFR BLD AUTO: 0.5 %
FERRITIN SERPL-MCNC: 74 NG/ML
FOLATE SERPL-MCNC: >20 NG/ML
GLUCOSE SERPL-MCNC: 204 MG/DL
HCT VFR BLD CALC: 43.7 %
HGB BLD-MCNC: 10.7 G/DL
IMM GRANULOCYTES NFR BLD AUTO: 2.6 %
IRON SATN MFR SERPL: 3 %
IRON SERPL-MCNC: 13 UG/DL
LYMPHOCYTES # BLD AUTO: 1.52 K/UL
LYMPHOCYTES NFR BLD AUTO: 4.2 %
MAN DIFF?: NORMAL
MCHC RBC-ENTMCNC: 18.9 PG
MCHC RBC-ENTMCNC: 24.5 GM/DL
MCV RBC AUTO: 77.1 FL
MONOCYTES # BLD AUTO: 1.31 K/UL
MONOCYTES NFR BLD AUTO: 3.6 %
NEUTROPHILS # BLD AUTO: 31.73 K/UL
NEUTROPHILS NFR BLD AUTO: 88 %
PLATELET # BLD AUTO: 958 K/UL
POTASSIUM SERPL-SCNC: 5.2 MMOL/L
PROT SERPL-MCNC: 6.5 G/DL
RBC # BLD: 5.67 M/UL
RBC # BLD: 5.67 M/UL
RBC # FLD: 21.2 %
RETICS # AUTO: 2.1 %
RETICS AGGREG/RBC NFR: 116.1 K/UL
SARS-COV-2 IGG SERPL IA-ACNC: 0.12 INDEX
SARS-COV-2 IGG SERPL QL IA: NEGATIVE
SODIUM SERPL-SCNC: 140 MMOL/L
TIBC SERPL-MCNC: 413 UG/DL
UIBC SERPL-MCNC: 401 UG/DL
VIT B12 SERPL-MCNC: 1441 PG/ML
WBC # FLD AUTO: 36.07 K/UL

## 2020-05-31 LAB
FERRITIN SERPL-MCNC: 79 NG/ML
FOLATE SERPL-MCNC: >20 NG/ML
IRON SATN MFR SERPL: 8 %
IRON SERPL-MCNC: 31 UG/DL
TIBC SERPL-MCNC: 369 UG/DL
UIBC SERPL-MCNC: 339 UG/DL
VIT B12 SERPL-MCNC: 1365 PG/ML

## 2020-06-03 ENCOUNTER — LABORATORY RESULT (OUTPATIENT)
Age: 85
End: 2020-06-03

## 2020-06-05 ENCOUNTER — APPOINTMENT (OUTPATIENT)
Dept: INTERNAL MEDICINE | Facility: CLINIC | Age: 85
End: 2020-06-05

## 2020-06-08 NOTE — ED ADULT NURSE NOTE - CCCP TRG CHIEF CMPLNT
[facility]  [address], [address]  [address], [address]  [phone] ([phone]) Packs/day: 0.50        Years: 42.00        Pack years: 21        Types: Cigarettes      Smokeless tobacco: Never Used    Alcohol use: Not Currently      Frequency: 4 or more times a week      Drinks per session: 1 or 2      Binge frequency: Never    Drug u shortness of breath up ~ Oct '20 with labs when she get back    [place] Derrick Pratt MD  Director of Hepatology  Medical Director of [facility] of [address] [address], 7th floor, [place], [place], [phone]

## 2020-06-09 ENCOUNTER — NON-APPOINTMENT (OUTPATIENT)
Age: 85
End: 2020-06-09

## 2020-06-09 ENCOUNTER — APPOINTMENT (OUTPATIENT)
Dept: CARDIOLOGY | Facility: CLINIC | Age: 85
End: 2020-06-09
Payer: MEDICARE

## 2020-06-09 VITALS
WEIGHT: 112 LBS | DIASTOLIC BLOOD PRESSURE: 68 MMHG | OXYGEN SATURATION: 90 % | HEART RATE: 66 BPM | BODY MASS INDEX: 20.61 KG/M2 | SYSTOLIC BLOOD PRESSURE: 181 MMHG | HEIGHT: 62 IN

## 2020-06-09 PROCEDURE — 99215 OFFICE O/P EST HI 40 MIN: CPT

## 2020-06-09 PROCEDURE — 93000 ELECTROCARDIOGRAM COMPLETE: CPT

## 2020-06-12 ENCOUNTER — RX RENEWAL (OUTPATIENT)
Age: 85
End: 2020-06-12

## 2020-06-18 ENCOUNTER — RESULT REVIEW (OUTPATIENT)
Age: 85
End: 2020-06-18

## 2020-06-18 ENCOUNTER — APPOINTMENT (OUTPATIENT)
Dept: INTERNAL MEDICINE | Facility: CLINIC | Age: 85
End: 2020-06-18
Payer: MEDICARE

## 2020-06-18 ENCOUNTER — OUTPATIENT (OUTPATIENT)
Dept: OUTPATIENT SERVICES | Facility: HOSPITAL | Age: 85
LOS: 1 days | End: 2020-06-18
Payer: COMMERCIAL

## 2020-06-18 ENCOUNTER — APPOINTMENT (OUTPATIENT)
Dept: ULTRASOUND IMAGING | Facility: CLINIC | Age: 85
End: 2020-06-18
Payer: MEDICARE

## 2020-06-18 VITALS
OXYGEN SATURATION: 95 % | RESPIRATION RATE: 14 BRPM | WEIGHT: 117 LBS | DIASTOLIC BLOOD PRESSURE: 60 MMHG | BODY MASS INDEX: 21.53 KG/M2 | HEART RATE: 67 BPM | SYSTOLIC BLOOD PRESSURE: 140 MMHG | HEIGHT: 62 IN | TEMPERATURE: 98.5 F

## 2020-06-18 DIAGNOSIS — Z00.8 ENCOUNTER FOR OTHER GENERAL EXAMINATION: ICD-10-CM

## 2020-06-18 DIAGNOSIS — I50.33 ACUTE ON CHRONIC DIASTOLIC (CONGESTIVE) HEART FAILURE: ICD-10-CM

## 2020-06-18 DIAGNOSIS — Z96.7 PRESENCE OF OTHER BONE AND TENDON IMPLANTS: Chronic | ICD-10-CM

## 2020-06-18 DIAGNOSIS — D47.1 CHRONIC MYELOPROLIFERATIVE DISEASE: ICD-10-CM

## 2020-06-18 DIAGNOSIS — Z98.49 CATARACT EXTRACTION STATUS, UNSPECIFIED EYE: Chronic | ICD-10-CM

## 2020-06-18 PROCEDURE — 99214 OFFICE O/P EST MOD 30 MIN: CPT | Mod: 25

## 2020-06-18 PROCEDURE — 93970 EXTREMITY STUDY: CPT

## 2020-06-18 PROCEDURE — 93970 EXTREMITY STUDY: CPT | Mod: 26

## 2020-06-18 PROCEDURE — 85610 PROTHROMBIN TIME: CPT | Mod: QW

## 2020-06-18 NOTE — HISTORY OF PRESENT ILLNESS
[Other: _____] : [unfilled] [FreeTextEntry8] : cc: sob\par \par c/o difficulty breathing starting today. Her left leg is swollen for 3 days. Has difficulty breathing with walking, but was ok at rest until after she was out and went to Dr Burgess, then Marcia Montenegro, then this office.

## 2020-06-18 NOTE — PHYSICAL EXAM
[Normal] : affect was normal and insight and judgment were intact [de-identified] : rales at lung bases [de-identified] : leg edema L > R

## 2020-06-20 LAB
INR PPP: 3.4 RATIO
POCT-PROTHROMBIN TIME: 40.9 SECS
QUALITY CONTROL: YES

## 2020-06-26 LAB
ANION GAP SERPL CALC-SCNC: 23 MMOL/L
BUN SERPL-MCNC: 20 MG/DL
CALCIUM SERPL-MCNC: 9.6 MG/DL
CHLORIDE SERPL-SCNC: 94 MMOL/L
CO2 SERPL-SCNC: 22 MMOL/L
CREAT SERPL-MCNC: 0.81 MG/DL
GLUCOSE SERPL-MCNC: 174 MG/DL
POTASSIUM SERPL-SCNC: 4.3 MMOL/L
POTASSIUM SERPL-SCNC: 5.6 MMOL/L
SODIUM SERPL-SCNC: 139 MMOL/L

## 2020-06-27 NOTE — ED PROVIDER NOTE - PROGRESS NOTE DETAILS
H&P- Lexy Lumpriss 1972, 52 y o  male MRN: [id number]    Unit/Bed#: [address] 214-01 Encounter: [id number]    Primary Care Provider: ADELITA Trinh   Date and time admitted to hospital: 6/27/2020  5:02 AM        * GI bleed  Assessment & Plan  Admit to med/surg  HGB 10 3  NPO  Consult GI, planning scope today  Continue protonix              VTE Prophylaxis: Pharmacologic VTE Prophylaxis contraindicated due to GI bleed  / sequential compression device   Code Status: full code  POLST: There is no POLST form on file for this patient (pre-hospital)  Discussion with family: no family at bedside    Anticipated Length of Stay:  Patient will be admitted on an Observation basis with an anticipated length of stay of  Less than  2 midnights  Justification for Hospital Stay: patient requires GI consult and scope    Total Time for Visit, including Counseling / Coordination of Care: 45 minutes  Greater than 50% of this total time spent on direct patient counseling and coordination of care  Chief Complaint:   Vomiting blood    History of Present Illness:    Mónica Medina is a 52 y o  male who presents with vomiting blood  Patient had 1 episode of vomiting with some blood in it  He also has noticed black stools  He denies abdominal pain  He has been scoped 7 years ago  Review of Systems:    Review of Systems   Constitutional: Negative  HENT: Negative  Eyes: Negative  Respiratory: Negative  Cardiovascular: Negative  Gastrointestinal:        Blood in vomiting, black stool   Endocrine: Negative  Genitourinary: Negative  Musculoskeletal: Negative  Skin: Negative  Allergic/Immunologic: Negative  Neurological: Negative  Hematological: Negative  Psychiatric/Behavioral: Negative          Past Medical and Surgical History:     Past Medical History:   Diagnosis Date    Hepatitis B     History of ulcer disease     Hypertension     Leukopenia        No past surgical history on file     Meds/Allergies:    Prior to Admission medications    Not on File     I have reviewed home medications with patient personally  Allergies: Allergies   Allergen Reactions    No Known Allergies        Social History:     Marital Status: /Civil Union   Occupation: still working  Patient Pre-hospital Living Situation: lives with wife  Patient Pre-hospital Level of Mobility: ambulatory  Patient Pre-hospital Diet Restrictions: none  Substance Use History:   Social History     Substance and Sexual Activity   Alcohol Use Yes    Comment: Socially     Social History     Tobacco Use   Smoking Status Never Smoker   Smokeless Tobacco Never Used     Social History     Substance and Sexual Activity   Drug Use No       Family History:    non-contributory    Physical Exam:     Vitals:   Blood Pressure: 141/84 (06/27/20 0505)  Pulse: 68 (06/27/20 0505)  Temperature: 98 1 °F (36 7 °C) (06/27/20 0505)  SpO2: 98 % (06/27/20 0505)    Physical Exam   Constitutional: He is oriented to person, place, and time  He appears well-developed and well-nourished  No distress  HENT:   Head: Normocephalic and atraumatic  Eyes: Pupils are equal, round, and reactive to light  EOM are normal    Neck: Normal range of motion  Neck supple  Cardiovascular: Normal rate and regular rhythm  Pulmonary/Chest: Effort normal and breath sounds normal    Abdominal: Soft  Bowel sounds are normal  He exhibits no distension  There is no tenderness  Heme positive stool   Musculoskeletal: Normal range of motion  He exhibits no edema or deformity  Neurological: He is alert and oriented to person, place, and time  Skin: Skin is warm and dry  He is not diaphoretic  Psychiatric: He has a normal mood and affect  His behavior is normal    Vitals reviewed  Additional Data:     Lab Results: I have personally reviewed pertinent reports        Results from last 7 days   Lab Units 06/27/20  0131   WBC Thousand/uL 9 50   HEMOGLOBIN g/dL 10 3*   HEMATOCRIT % 29 8*   PLATELETS Thousands/uL 150   NEUTROS PCT % 69*   LYMPHS PCT % 23*   MONOS PCT % 7   EOS PCT % 1     Results from last 7 days   Lab Units 06/27/20  0131   SODIUM mmol/L 137   POTASSIUM mmol/L 3 8   CHLORIDE mmol/L 105   CO2 mmol/L 26   BUN mg/dL 37*   CREATININE mg/dL 1 00   ANION GAP mmol/L 6   CALCIUM mg/dL 8 8   ALBUMIN g/dL 3 9   TOTAL BILIRUBIN mg/dL 1 50*   ALK PHOS U/L 51   ALT U/L 13   AST U/L 16*   GLUCOSE RANDOM mg/dL 99     Results from last 7 days   Lab Units 06/27/20  0131   INR  1 19             Results from last 7 days   Lab Units 06/27/20  0131   LACTIC ACID mmol/L 0 7       Imaging: I have personally reviewed pertinent reports  No orders to display       EKG, Pathology, and Other Studies Reviewed on Admission:   · EKG: NSR    Allscripts / Epic Records Reviewed: Yes     ** Please Note: This note has been constructed using a voice recognition system   ** discussed case with DR. Agustin will admit

## 2020-07-11 ENCOUNTER — RX RENEWAL (OUTPATIENT)
Age: 85
End: 2020-07-11

## 2020-07-29 ENCOUNTER — RX RENEWAL (OUTPATIENT)
Age: 85
End: 2020-07-29

## 2020-08-25 ENCOUNTER — RX RENEWAL (OUTPATIENT)
Age: 85
End: 2020-08-25

## 2020-10-15 ENCOUNTER — LABORATORY RESULT (OUTPATIENT)
Age: 85
End: 2020-10-15

## 2020-10-15 ENCOUNTER — APPOINTMENT (OUTPATIENT)
Dept: INTERNAL MEDICINE | Facility: CLINIC | Age: 85
End: 2020-10-15
Payer: MEDICARE

## 2020-10-15 VITALS
HEART RATE: 68 BPM | DIASTOLIC BLOOD PRESSURE: 68 MMHG | TEMPERATURE: 97.4 F | RESPIRATION RATE: 14 BRPM | WEIGHT: 112 LBS | HEIGHT: 62 IN | BODY MASS INDEX: 20.61 KG/M2 | OXYGEN SATURATION: 98 % | SYSTOLIC BLOOD PRESSURE: 124 MMHG

## 2020-10-15 DIAGNOSIS — E78.5 HYPERLIPIDEMIA, UNSPECIFIED: ICD-10-CM

## 2020-10-15 DIAGNOSIS — E11.9 TYPE 2 DIABETES MELLITUS W/OUT COMPLICATIONS: ICD-10-CM

## 2020-10-15 DIAGNOSIS — I10 ESSENTIAL (PRIMARY) HYPERTENSION: ICD-10-CM

## 2020-10-15 DIAGNOSIS — R60.9 EDEMA, UNSPECIFIED: ICD-10-CM

## 2020-10-15 DIAGNOSIS — D64.9 ANEMIA, UNSPECIFIED: ICD-10-CM

## 2020-10-15 LAB
INR PPP: 2.7 RATIO
POCT-PROTHROMBIN TIME: 32.7 SECS
QUALITY CONTROL: YES

## 2020-10-15 PROCEDURE — G0008: CPT

## 2020-10-15 PROCEDURE — 85610 PROTHROMBIN TIME: CPT | Mod: QW

## 2020-10-15 PROCEDURE — 99214 OFFICE O/P EST MOD 30 MIN: CPT

## 2020-10-15 PROCEDURE — 90662 IIV NO PRSV INCREASED AG IM: CPT

## 2020-10-15 RX ORDER — WARFARIN SODIUM 3 MG/1
3 TABLET ORAL
Qty: 30 | Refills: 5 | Status: DISCONTINUED | COMMUNITY
Start: 2018-07-16 | End: 2020-10-15

## 2020-10-15 NOTE — HISTORY OF PRESENT ILLNESS
[Other: _____] : [unfilled] [FreeTextEntry1] : DM [de-identified] : Pt has DM.\par She ate this morning about 3 hours ago.

## 2020-10-16 ENCOUNTER — TRANSCRIPTION ENCOUNTER (OUTPATIENT)
Age: 85
End: 2020-10-16

## 2020-10-16 LAB
ALBUMIN SERPL ELPH-MCNC: 4.4 G/DL
ALP BLD-CCNC: 245 U/L
ALT SERPL-CCNC: 18 U/L
ANION GAP SERPL CALC-SCNC: 14 MMOL/L
AST SERPL-CCNC: 23 U/L
BILIRUB SERPL-MCNC: 0.7 MG/DL
BUN SERPL-MCNC: 25 MG/DL
CALCIUM SERPL-MCNC: 9.8 MG/DL
CHLORIDE SERPL-SCNC: 101 MMOL/L
CHOLEST SERPL-MCNC: 97 MG/DL
CHOLEST/HDLC SERPL: 3.2 RATIO
CO2 SERPL-SCNC: 28 MMOL/L
CREAT SERPL-MCNC: 0.86 MG/DL
FOLATE SERPL-MCNC: >20 NG/ML
GLUCOSE SERPL-MCNC: 127 MG/DL
HDLC SERPL-MCNC: 30 MG/DL
LDLC SERPL CALC-MCNC: 46 MG/DL
POTASSIUM SERPL-SCNC: 4.6 MMOL/L
PROT SERPL-MCNC: 6.4 G/DL
SODIUM SERPL-SCNC: 144 MMOL/L
TRIGL SERPL-MCNC: 99 MG/DL
TSH SERPL-ACNC: 2.9 UIU/ML
VIT B12 SERPL-MCNC: 1955 PG/ML

## 2020-10-17 ENCOUNTER — RX RENEWAL (OUTPATIENT)
Age: 85
End: 2020-10-17

## 2020-10-17 LAB
BASOPHILS # BLD AUTO: 0.41 K/UL
BASOPHILS NFR BLD AUTO: 1 %
EOSINOPHIL # BLD AUTO: 0 K/UL
EOSINOPHIL NFR BLD AUTO: 0 %
ESTIMATED AVERAGE GLUCOSE: 171 MG/DL
HBA1C MFR BLD HPLC: 7.6 %
HCT VFR BLD CALC: 51.6 %
HGB BLD-MCNC: 12.7 G/DL
LYMPHOCYTES # BLD AUTO: 3.66 K/UL
LYMPHOCYTES NFR BLD AUTO: 9 %
MAN DIFF?: NORMAL
MCHC RBC-ENTMCNC: 19.2 PG
MCHC RBC-ENTMCNC: 24.6 GM/DL
MCV RBC AUTO: 78.1 FL
MONOCYTES # BLD AUTO: 1.62 K/UL
MONOCYTES NFR BLD AUTO: 4 %
NEUTROPHILS # BLD AUTO: 34.93 K/UL
NEUTROPHILS NFR BLD AUTO: 82 %
PLATELET # BLD AUTO: 602 K/UL
RBC # BLD: 6.61 M/UL
RBC # FLD: 23.4 %
WBC # FLD AUTO: 40.62 K/UL

## 2020-10-26 ENCOUNTER — RX RENEWAL (OUTPATIENT)
Age: 85
End: 2020-10-26

## 2020-10-26 RX ORDER — FOLIC ACID 1 MG/1
1 TABLET ORAL
Qty: 30 | Refills: 5 | Status: ACTIVE | COMMUNITY
Start: 2019-12-14 | End: 1900-01-01

## 2020-10-29 ENCOUNTER — RX RENEWAL (OUTPATIENT)
Age: 85
End: 2020-10-29

## 2020-10-30 NOTE — PATIENT PROFILE ADULT - PACKS PER DAY
0 Cartilage Graft Text: The defect edges were debeveled with a #15 scalpel blade.  Given the location of the defect, shape of the defect, the fact the defect involved a full thickness cartilage defect a cartilage graft was deemed most appropriate.  An appropriate donor site was identified, cleansed, and anesthetized. The cartilage graft was then harvested and transferred to the recipient site, oriented appropriately and then sutured into place.  The secondary defect was then repaired using a primary closure.

## 2020-11-23 NOTE — ED ADULT NURSE NOTE - BREATHING, MLM
Addended by: Brenton Ramirez on: 11/23/2020 10:19 AM     Modules accepted: Orders
Spontaneous, unlabored and symmetrical

## 2020-11-25 ENCOUNTER — RX RENEWAL (OUTPATIENT)
Age: 85
End: 2020-11-25

## 2020-11-25 RX ORDER — FUROSEMIDE 40 MG/1
40 TABLET ORAL
Qty: 30 | Refills: 2 | Status: ACTIVE | COMMUNITY
Start: 2019-05-24 | End: 1900-01-01

## 2020-11-30 ENCOUNTER — NON-APPOINTMENT (OUTPATIENT)
Age: 85
End: 2020-11-30

## 2020-11-30 ENCOUNTER — APPOINTMENT (OUTPATIENT)
Dept: CARDIOLOGY | Facility: CLINIC | Age: 85
End: 2020-11-30
Payer: MEDICARE

## 2020-11-30 VITALS — DIASTOLIC BLOOD PRESSURE: 80 MMHG | SYSTOLIC BLOOD PRESSURE: 140 MMHG

## 2020-11-30 VITALS
WEIGHT: 113 LBS | BODY MASS INDEX: 20.8 KG/M2 | HEIGHT: 62 IN | SYSTOLIC BLOOD PRESSURE: 176 MMHG | HEART RATE: 75 BPM | OXYGEN SATURATION: 95 % | DIASTOLIC BLOOD PRESSURE: 80 MMHG

## 2020-11-30 DIAGNOSIS — Z79.01 LONG TERM (CURRENT) USE OF ANTICOAGULANTS: ICD-10-CM

## 2020-11-30 DIAGNOSIS — I48.91 UNSPECIFIED ATRIAL FIBRILLATION: ICD-10-CM

## 2020-11-30 DIAGNOSIS — I25.10 ATHEROSCLEROTIC HEART DISEASE OF NATIVE CORONARY ARTERY W/OUT ANGINA PECTORIS: ICD-10-CM

## 2020-11-30 LAB
INR PPP: 2.3 RATIO
QUALITY CONTROL: YES

## 2020-11-30 PROCEDURE — 93000 ELECTROCARDIOGRAM COMPLETE: CPT

## 2020-11-30 PROCEDURE — 99215 OFFICE O/P EST HI 40 MIN: CPT

## 2021-01-14 ENCOUNTER — EMERGENCY (EMERGENCY)
Facility: HOSPITAL | Age: 86
LOS: 1 days | Discharge: ANOTHER HOSPITAL | End: 2021-01-14
Attending: EMERGENCY MEDICINE | Admitting: EMERGENCY MEDICINE
Payer: COMMERCIAL

## 2021-01-14 ENCOUNTER — APPOINTMENT (OUTPATIENT)
Dept: INTERNAL MEDICINE | Facility: CLINIC | Age: 86
End: 2021-01-14

## 2021-01-14 ENCOUNTER — INPATIENT (INPATIENT)
Facility: HOSPITAL | Age: 86
LOS: 1 days | End: 2021-01-16
Attending: STUDENT IN AN ORGANIZED HEALTH CARE EDUCATION/TRAINING PROGRAM | Admitting: STUDENT IN AN ORGANIZED HEALTH CARE EDUCATION/TRAINING PROGRAM
Payer: MEDICARE

## 2021-01-14 VITALS
DIASTOLIC BLOOD PRESSURE: 59 MMHG | OXYGEN SATURATION: 99 % | TEMPERATURE: 98 F | HEIGHT: 63 IN | RESPIRATION RATE: 16 BRPM | SYSTOLIC BLOOD PRESSURE: 103 MMHG | HEART RATE: 97 BPM

## 2021-01-14 VITALS
TEMPERATURE: 99 F | WEIGHT: 110.01 LBS | RESPIRATION RATE: 18 BRPM | DIASTOLIC BLOOD PRESSURE: 78 MMHG | SYSTOLIC BLOOD PRESSURE: 180 MMHG | OXYGEN SATURATION: 93 % | HEART RATE: 92 BPM | HEIGHT: 63 IN

## 2021-01-14 VITALS
OXYGEN SATURATION: 98 % | DIASTOLIC BLOOD PRESSURE: 54 MMHG | SYSTOLIC BLOOD PRESSURE: 108 MMHG | RESPIRATION RATE: 17 BRPM | HEART RATE: 68 BPM

## 2021-01-14 DIAGNOSIS — I48.91 UNSPECIFIED ATRIAL FIBRILLATION: ICD-10-CM

## 2021-01-14 DIAGNOSIS — I50.30 UNSPECIFIED DIASTOLIC (CONGESTIVE) HEART FAILURE: ICD-10-CM

## 2021-01-14 DIAGNOSIS — I25.10 ATHEROSCLEROTIC HEART DISEASE OF NATIVE CORONARY ARTERY WITHOUT ANGINA PECTORIS: ICD-10-CM

## 2021-01-14 DIAGNOSIS — E11.65 TYPE 2 DIABETES MELLITUS WITH HYPERGLYCEMIA: ICD-10-CM

## 2021-01-14 DIAGNOSIS — N30.90 CYSTITIS, UNSPECIFIED WITHOUT HEMATURIA: ICD-10-CM

## 2021-01-14 DIAGNOSIS — Z98.49 CATARACT EXTRACTION STATUS, UNSPECIFIED EYE: Chronic | ICD-10-CM

## 2021-01-14 DIAGNOSIS — D47.1 CHRONIC MYELOPROLIFERATIVE DISEASE: ICD-10-CM

## 2021-01-14 DIAGNOSIS — I62.00 NONTRAUMATIC SUBDURAL HEMORRHAGE, UNSPECIFIED: ICD-10-CM

## 2021-01-14 DIAGNOSIS — Z96.7 PRESENCE OF OTHER BONE AND TENDON IMPLANTS: Chronic | ICD-10-CM

## 2021-01-14 DIAGNOSIS — Z29.9 ENCOUNTER FOR PROPHYLACTIC MEASURES, UNSPECIFIED: ICD-10-CM

## 2021-01-14 DIAGNOSIS — D72.829 ELEVATED WHITE BLOOD CELL COUNT, UNSPECIFIED: ICD-10-CM

## 2021-01-14 DIAGNOSIS — I50.33 ACUTE ON CHRONIC DIASTOLIC (CONGESTIVE) HEART FAILURE: ICD-10-CM

## 2021-01-14 DIAGNOSIS — R79.1 ABNORMAL COAGULATION PROFILE: ICD-10-CM

## 2021-01-14 LAB
ACETONE SERPL-MCNC: SIGNIFICANT CHANGE UP
ALBUMIN SERPL ELPH-MCNC: 3.1 G/DL — LOW (ref 3.3–5)
ALP SERPL-CCNC: 233 U/L — HIGH (ref 30–120)
ALT FLD-CCNC: 23 U/L DA — SIGNIFICANT CHANGE UP (ref 10–60)
ANION GAP SERPL CALC-SCNC: 15 MMOL/L — HIGH (ref 7–14)
ANION GAP SERPL CALC-SCNC: 9 MMOL/L — SIGNIFICANT CHANGE UP (ref 5–17)
ANISOCYTOSIS BLD QL: SLIGHT — SIGNIFICANT CHANGE UP
APPEARANCE UR: ABNORMAL
APTT BLD: 46.5 SEC — HIGH (ref 27–36.3)
APTT BLD: 97.9 SEC — HIGH (ref 27.5–35.5)
AST SERPL-CCNC: 26 U/L — SIGNIFICANT CHANGE UP (ref 10–40)
BACTERIA # UR AUTO: ABNORMAL
BASOPHILS # BLD AUTO: 0 K/UL — SIGNIFICANT CHANGE UP (ref 0–0.2)
BASOPHILS NFR BLD AUTO: 0 % — SIGNIFICANT CHANGE UP (ref 0–2)
BILIRUB SERPL-MCNC: 1.4 MG/DL — HIGH (ref 0.2–1.2)
BILIRUB UR-MCNC: ABNORMAL
BLD GP AB SCN SERPL QL: NEGATIVE — SIGNIFICANT CHANGE UP
BUN SERPL-MCNC: 28 MG/DL — HIGH (ref 7–23)
BUN SERPL-MCNC: 34 MG/DL — HIGH (ref 7–23)
CALCIUM SERPL-MCNC: 10 MG/DL — SIGNIFICANT CHANGE UP (ref 8.4–10.5)
CALCIUM SERPL-MCNC: 9.3 MG/DL — SIGNIFICANT CHANGE UP (ref 8.4–10.5)
CHLORIDE SERPL-SCNC: 93 MMOL/L — LOW (ref 96–108)
CHLORIDE SERPL-SCNC: 95 MMOL/L — LOW (ref 98–107)
CO2 SERPL-SCNC: 23 MMOL/L — SIGNIFICANT CHANGE UP (ref 22–31)
CO2 SERPL-SCNC: 29 MMOL/L — SIGNIFICANT CHANGE UP (ref 22–31)
COLOR SPEC: YELLOW — SIGNIFICANT CHANGE UP
CREAT SERPL-MCNC: 1.28 MG/DL — SIGNIFICANT CHANGE UP (ref 0.5–1.3)
CREAT SERPL-MCNC: 1.34 MG/DL — HIGH (ref 0.5–1.3)
DIFF PNL FLD: ABNORMAL
ELLIPTOCYTES BLD QL SMEAR: SLIGHT — SIGNIFICANT CHANGE UP
EOSINOPHIL # BLD AUTO: 0 K/UL — SIGNIFICANT CHANGE UP (ref 0–0.5)
EOSINOPHIL NFR BLD AUTO: 0 % — SIGNIFICANT CHANGE UP (ref 0–6)
EPI CELLS # UR: SIGNIFICANT CHANGE UP
FLU A RESULT: SIGNIFICANT CHANGE UP
FLU A RESULT: SIGNIFICANT CHANGE UP
FLUAV AG NPH QL: SIGNIFICANT CHANGE UP
FLUBV AG NPH QL: SIGNIFICANT CHANGE UP
GIANT PLATELETS BLD QL SMEAR: PRESENT — SIGNIFICANT CHANGE UP
GLUCOSE BLDC GLUCOMTR-MCNC: 346 MG/DL — HIGH (ref 70–99)
GLUCOSE BLDC GLUCOMTR-MCNC: 368 MG/DL — HIGH (ref 70–99)
GLUCOSE BLDC GLUCOMTR-MCNC: 381 MG/DL — HIGH (ref 70–99)
GLUCOSE BLDC GLUCOMTR-MCNC: 488 MG/DL — CRITICAL HIGH (ref 70–99)
GLUCOSE BLDC GLUCOMTR-MCNC: 494 MG/DL — CRITICAL HIGH (ref 70–99)
GLUCOSE SERPL-MCNC: 395 MG/DL — HIGH (ref 70–99)
GLUCOSE SERPL-MCNC: 517 MG/DL — CRITICAL HIGH (ref 70–99)
GLUCOSE UR QL: 1000 MG/DL
HCT VFR BLD CALC: 46.9 % — HIGH (ref 34.5–45)
HCT VFR BLD CALC: 48.1 % — HIGH (ref 34.5–45)
HGB BLD-MCNC: 12.8 G/DL — SIGNIFICANT CHANGE UP (ref 11.5–15.5)
HGB BLD-MCNC: 12.9 G/DL — SIGNIFICANT CHANGE UP (ref 11.5–15.5)
HYPOCHROMIA BLD QL: SLIGHT — SIGNIFICANT CHANGE UP
INR BLD: 2.33 RATIO — HIGH (ref 0.88–1.16)
INR BLD: >14.99 RATIO — CRITICAL HIGH (ref 0.88–1.16)
KETONES UR-MCNC: NEGATIVE — SIGNIFICANT CHANGE UP
LACTATE SERPL-SCNC: 3.8 MMOL/L — HIGH (ref 0.7–2)
LEUKOCYTE ESTERASE UR-ACNC: ABNORMAL
LYMPHOCYTES # BLD AUTO: 1.76 K/UL — SIGNIFICANT CHANGE UP (ref 1–3.3)
LYMPHOCYTES # BLD AUTO: 5 % — LOW (ref 13–44)
LYMPHOCYTES # SPEC AUTO: 2 % — HIGH (ref 0–0)
MACROCYTES BLD QL: SLIGHT — SIGNIFICANT CHANGE UP
MAGNESIUM SERPL-MCNC: 1.7 MG/DL — SIGNIFICANT CHANGE UP (ref 1.6–2.6)
MANUAL SMEAR VERIFICATION: SIGNIFICANT CHANGE UP
MCHC RBC-ENTMCNC: 21.4 PG — LOW (ref 27–34)
MCHC RBC-ENTMCNC: 21.6 PG — LOW (ref 27–34)
MCHC RBC-ENTMCNC: 26.6 GM/DL — LOW (ref 32–36)
MCHC RBC-ENTMCNC: 27.5 GM/DL — LOW (ref 32–36)
MCV RBC AUTO: 78.4 FL — LOW (ref 80–100)
MCV RBC AUTO: 80.4 FL — SIGNIFICANT CHANGE UP (ref 80–100)
MICROCYTES BLD QL: SLIGHT — SIGNIFICANT CHANGE UP
MONOCYTES # BLD AUTO: 2.12 K/UL — HIGH (ref 0–0.9)
MONOCYTES NFR BLD AUTO: 6 % — SIGNIFICANT CHANGE UP (ref 2–14)
NEUTROPHILS # BLD AUTO: 29.64 K/UL — HIGH (ref 1.8–7.4)
NEUTROPHILS NFR BLD AUTO: 84 % — HIGH (ref 43–77)
NEUTS HYPERSEG # BLD: PRESENT — SIGNIFICANT CHANGE UP
NITRITE UR-MCNC: NEGATIVE — SIGNIFICANT CHANGE UP
NRBC # BLD: 0 /100 WBCS — SIGNIFICANT CHANGE UP
NRBC # BLD: 0 — SIGNIFICANT CHANGE UP
NRBC # BLD: SIGNIFICANT CHANGE UP /100 WBCS (ref 0–0)
NRBC # FLD: 0.07 K/UL — HIGH
PH UR: 5 — SIGNIFICANT CHANGE UP (ref 5–8)
PHOSPHATE SERPL-MCNC: 4.3 MG/DL — SIGNIFICANT CHANGE UP (ref 2.5–4.5)
PLAT MORPH BLD: ABNORMAL
PLATELET # BLD AUTO: 197 K/UL — SIGNIFICANT CHANGE UP (ref 150–400)
PLATELET # BLD AUTO: 209 K/UL — SIGNIFICANT CHANGE UP (ref 150–400)
POIKILOCYTOSIS BLD QL AUTO: SLIGHT — SIGNIFICANT CHANGE UP
POLYCHROMASIA BLD QL SMEAR: SLIGHT — SIGNIFICANT CHANGE UP
POTASSIUM SERPL-MCNC: 4.3 MMOL/L — SIGNIFICANT CHANGE UP (ref 3.5–5.3)
POTASSIUM SERPL-MCNC: 4.5 MMOL/L — SIGNIFICANT CHANGE UP (ref 3.5–5.3)
POTASSIUM SERPL-SCNC: 4.3 MMOL/L — SIGNIFICANT CHANGE UP (ref 3.5–5.3)
POTASSIUM SERPL-SCNC: 4.5 MMOL/L — SIGNIFICANT CHANGE UP (ref 3.5–5.3)
PROT SERPL-MCNC: 6.4 G/DL — SIGNIFICANT CHANGE UP (ref 6–8.3)
PROT UR-MCNC: 100 MG/DL
PROTHROM AB SERPL-ACNC: 25.5 SEC — HIGH (ref 10.6–13.6)
PROTHROM AB SERPL-ACNC: >150 SEC — HIGH (ref 10.6–13.6)
RBC # BLD: 5.98 M/UL — HIGH (ref 3.8–5.2)
RBC # BLD: 5.98 M/UL — HIGH (ref 3.8–5.2)
RBC # FLD: 23.9 % — HIGH (ref 10.3–14.5)
RBC # FLD: 24.2 % — HIGH (ref 10.3–14.5)
RBC BLD AUTO: ABNORMAL
RBC CASTS # UR COMP ASSIST: ABNORMAL /HPF (ref 0–4)
RH IG SCN BLD-IMP: POSITIVE — SIGNIFICANT CHANGE UP
RSV RESULT: SIGNIFICANT CHANGE UP
RSV RNA RESP QL NAA+PROBE: SIGNIFICANT CHANGE UP
SARS-COV-2 RNA SPEC QL NAA+PROBE: SIGNIFICANT CHANGE UP
SODIUM SERPL-SCNC: 131 MMOL/L — LOW (ref 135–145)
SODIUM SERPL-SCNC: 133 MMOL/L — LOW (ref 135–145)
SP GR SPEC: 1.02 — SIGNIFICANT CHANGE UP (ref 1.01–1.02)
SPHEROCYTES BLD QL SMEAR: SLIGHT — SIGNIFICANT CHANGE UP
UROBILINOGEN FLD QL: 4 MG/DL
VARIANT LYMPHS # BLD: 3 % — SIGNIFICANT CHANGE UP (ref 0–6)
WBC # BLD: 35.3 K/UL — HIGH (ref 3.8–10.5)
WBC # BLD: 47.44 K/UL — CRITICAL HIGH (ref 3.8–10.5)
WBC # FLD AUTO: 35.3 K/UL — HIGH (ref 3.8–10.5)
WBC # FLD AUTO: 47.44 K/UL — CRITICAL HIGH (ref 3.8–10.5)
WBC UR QL: ABNORMAL

## 2021-01-14 PROCEDURE — 71045 X-RAY EXAM CHEST 1 VIEW: CPT

## 2021-01-14 PROCEDURE — 96375 TX/PRO/DX INJ NEW DRUG ADDON: CPT

## 2021-01-14 PROCEDURE — 87631 RESP VIRUS 3-5 TARGETS: CPT

## 2021-01-14 PROCEDURE — 85730 THROMBOPLASTIN TIME PARTIAL: CPT

## 2021-01-14 PROCEDURE — 93005 ELECTROCARDIOGRAM TRACING: CPT

## 2021-01-14 PROCEDURE — 70450 CT HEAD/BRAIN W/O DYE: CPT

## 2021-01-14 PROCEDURE — 70450 CT HEAD/BRAIN W/O DYE: CPT | Mod: 26

## 2021-01-14 PROCEDURE — 96361 HYDRATE IV INFUSION ADD-ON: CPT

## 2021-01-14 PROCEDURE — 99285 EMERGENCY DEPT VISIT HI MDM: CPT

## 2021-01-14 PROCEDURE — 99291 CRITICAL CARE FIRST HOUR: CPT | Mod: 25

## 2021-01-14 PROCEDURE — 96365 THER/PROPH/DIAG IV INF INIT: CPT

## 2021-01-14 PROCEDURE — 93010 ELECTROCARDIOGRAM REPORT: CPT

## 2021-01-14 PROCEDURE — 85025 COMPLETE CBC W/AUTO DIFF WBC: CPT

## 2021-01-14 PROCEDURE — 87040 BLOOD CULTURE FOR BACTERIA: CPT

## 2021-01-14 PROCEDURE — 99223 1ST HOSP IP/OBS HIGH 75: CPT | Mod: GC

## 2021-01-14 PROCEDURE — 82009 KETONE BODYS QUAL: CPT

## 2021-01-14 PROCEDURE — U0003: CPT

## 2021-01-14 PROCEDURE — 36415 COLL VENOUS BLD VENIPUNCTURE: CPT

## 2021-01-14 PROCEDURE — 70450 CT HEAD/BRAIN W/O DYE: CPT | Mod: 26,77

## 2021-01-14 PROCEDURE — 71045 X-RAY EXAM CHEST 1 VIEW: CPT | Mod: 26

## 2021-01-14 PROCEDURE — 87086 URINE CULTURE/COLONY COUNT: CPT

## 2021-01-14 PROCEDURE — 80053 COMPREHEN METABOLIC PANEL: CPT

## 2021-01-14 PROCEDURE — 81001 URINALYSIS AUTO W/SCOPE: CPT

## 2021-01-14 PROCEDURE — 85610 PROTHROMBIN TIME: CPT

## 2021-01-14 PROCEDURE — 83605 ASSAY OF LACTIC ACID: CPT

## 2021-01-14 PROCEDURE — U0005: CPT

## 2021-01-14 PROCEDURE — 82962 GLUCOSE BLOOD TEST: CPT

## 2021-01-14 RX ORDER — GLIMEPIRIDE 1 MG
1 TABLET ORAL
Qty: 0 | Refills: 0 | DISCHARGE

## 2021-01-14 RX ORDER — PIPERACILLIN AND TAZOBACTAM 4; .5 G/20ML; G/20ML
3.38 INJECTION, POWDER, LYOPHILIZED, FOR SOLUTION INTRAVENOUS EVERY 12 HOURS
Refills: 0 | Status: DISCONTINUED | OUTPATIENT
Start: 2021-01-14 | End: 2021-01-16

## 2021-01-14 RX ORDER — DEXTROSE 50 % IN WATER 50 %
15 SYRINGE (ML) INTRAVENOUS ONCE
Refills: 0 | Status: DISCONTINUED | OUTPATIENT
Start: 2021-01-14 | End: 2021-01-16

## 2021-01-14 RX ORDER — ATORVASTATIN CALCIUM 80 MG/1
40 TABLET, FILM COATED ORAL AT BEDTIME
Refills: 0 | Status: DISCONTINUED | OUTPATIENT
Start: 2021-01-14 | End: 2021-01-16

## 2021-01-14 RX ORDER — METOPROLOL TARTRATE 50 MG
50 TABLET ORAL
Refills: 0 | Status: DISCONTINUED | OUTPATIENT
Start: 2021-01-14 | End: 2021-01-16

## 2021-01-14 RX ORDER — INSULIN LISPRO 100/ML
4 VIAL (ML) SUBCUTANEOUS ONCE
Refills: 0 | Status: COMPLETED | OUTPATIENT
Start: 2021-01-14 | End: 2021-01-14

## 2021-01-14 RX ORDER — INSULIN GLARGINE 100 [IU]/ML
10 INJECTION, SOLUTION SUBCUTANEOUS AT BEDTIME
Refills: 0 | Status: DISCONTINUED | OUTPATIENT
Start: 2021-01-14 | End: 2021-01-14

## 2021-01-14 RX ORDER — INSULIN HUMAN 100 [IU]/ML
5 INJECTION, SOLUTION SUBCUTANEOUS ONCE
Refills: 0 | Status: COMPLETED | OUTPATIENT
Start: 2021-01-14 | End: 2021-01-14

## 2021-01-14 RX ORDER — DEXTROSE 50 % IN WATER 50 %
12.5 SYRINGE (ML) INTRAVENOUS ONCE
Refills: 0 | Status: DISCONTINUED | OUTPATIENT
Start: 2021-01-14 | End: 2021-01-16

## 2021-01-14 RX ORDER — DEXTROSE 50 % IN WATER 50 %
25 SYRINGE (ML) INTRAVENOUS ONCE
Refills: 0 | Status: DISCONTINUED | OUTPATIENT
Start: 2021-01-14 | End: 2021-01-16

## 2021-01-14 RX ORDER — HYDROXYUREA 500 MG/1
500 CAPSULE ORAL DAILY
Refills: 0 | Status: DISCONTINUED | OUTPATIENT
Start: 2021-01-14 | End: 2021-01-16

## 2021-01-14 RX ORDER — SODIUM CHLORIDE 9 MG/ML
1000 INJECTION INTRAMUSCULAR; INTRAVENOUS; SUBCUTANEOUS ONCE
Refills: 0 | Status: COMPLETED | OUTPATIENT
Start: 2021-01-14 | End: 2021-01-14

## 2021-01-14 RX ORDER — PIPERACILLIN AND TAZOBACTAM 4; .5 G/20ML; G/20ML
3.38 INJECTION, POWDER, LYOPHILIZED, FOR SOLUTION INTRAVENOUS ONCE
Refills: 0 | Status: COMPLETED | OUTPATIENT
Start: 2021-01-14 | End: 2021-01-14

## 2021-01-14 RX ORDER — FUROSEMIDE 40 MG
40 TABLET ORAL DAILY
Refills: 0 | Status: DISCONTINUED | OUTPATIENT
Start: 2021-01-14 | End: 2021-01-16

## 2021-01-14 RX ORDER — GLUCAGON INJECTION, SOLUTION 0.5 MG/.1ML
1 INJECTION, SOLUTION SUBCUTANEOUS ONCE
Refills: 0 | Status: DISCONTINUED | OUTPATIENT
Start: 2021-01-14 | End: 2021-01-16

## 2021-01-14 RX ORDER — SODIUM CHLORIDE 9 MG/ML
1000 INJECTION, SOLUTION INTRAVENOUS
Refills: 0 | Status: DISCONTINUED | OUTPATIENT
Start: 2021-01-14 | End: 2021-01-16

## 2021-01-14 RX ORDER — CHOLECALCIFEROL (VITAMIN D3) 125 MCG
1000 CAPSULE ORAL DAILY
Refills: 0 | Status: DISCONTINUED | OUTPATIENT
Start: 2021-01-14 | End: 2021-01-16

## 2021-01-14 RX ORDER — LANSOPRAZOLE 15 MG/1
1 CAPSULE, DELAYED RELEASE ORAL
Qty: 0 | Refills: 0 | DISCHARGE

## 2021-01-14 RX ORDER — PROTHROMBIN COMPLEX CONCENTRATE (HUMAN) 25.5; 16.5; 24; 22; 22; 26 [IU]/ML; [IU]/ML; [IU]/ML; [IU]/ML; [IU]/ML; [IU]/ML
1500 POWDER, FOR SOLUTION INTRAVENOUS ONCE
Refills: 0 | Status: COMPLETED | OUTPATIENT
Start: 2021-01-14 | End: 2021-01-14

## 2021-01-14 RX ORDER — PHYTONADIONE (VIT K1) 5 MG
5 TABLET ORAL ONCE
Refills: 0 | Status: COMPLETED | OUTPATIENT
Start: 2021-01-14 | End: 2021-01-14

## 2021-01-14 RX ORDER — INSULIN LISPRO 100/ML
4 VIAL (ML) SUBCUTANEOUS ONCE
Refills: 0 | Status: DISCONTINUED | OUTPATIENT
Start: 2021-01-14 | End: 2021-01-14

## 2021-01-14 RX ORDER — FOLIC ACID 0.8 MG
1 TABLET ORAL
Qty: 0 | Refills: 0 | DISCHARGE

## 2021-01-14 RX ORDER — WARFARIN SODIUM 2.5 MG/1
0 TABLET ORAL
Qty: 0 | Refills: 0 | DISCHARGE

## 2021-01-14 RX ORDER — ASPIRIN/CALCIUM CARB/MAGNESIUM 324 MG
1 TABLET ORAL
Qty: 0 | Refills: 0 | DISCHARGE

## 2021-01-14 RX ORDER — ACETAMINOPHEN 500 MG
650 TABLET ORAL ONCE
Refills: 0 | Status: COMPLETED | OUTPATIENT
Start: 2021-01-14 | End: 2021-01-14

## 2021-01-14 RX ORDER — INSULIN GLARGINE 100 [IU]/ML
7 INJECTION, SOLUTION SUBCUTANEOUS AT BEDTIME
Refills: 0 | Status: DISCONTINUED | OUTPATIENT
Start: 2021-01-14 | End: 2021-01-15

## 2021-01-14 RX ORDER — RAMIPRIL 5 MG
1 CAPSULE ORAL
Qty: 0 | Refills: 0 | DISCHARGE

## 2021-01-14 RX ORDER — CHOLECALCIFEROL (VITAMIN D3) 125 MCG
1 CAPSULE ORAL
Qty: 0 | Refills: 0 | DISCHARGE

## 2021-01-14 RX ORDER — FOLIC ACID 0.8 MG
1 TABLET ORAL DAILY
Refills: 0 | Status: DISCONTINUED | OUTPATIENT
Start: 2021-01-14 | End: 2021-01-16

## 2021-01-14 RX ORDER — INSULIN LISPRO 100/ML
VIAL (ML) SUBCUTANEOUS AT BEDTIME
Refills: 0 | Status: DISCONTINUED | OUTPATIENT
Start: 2021-01-14 | End: 2021-01-16

## 2021-01-14 RX ORDER — METOPROLOL TARTRATE 50 MG
1 TABLET ORAL
Qty: 0 | Refills: 0 | DISCHARGE

## 2021-01-14 RX ORDER — INSULIN LISPRO 100/ML
VIAL (ML) SUBCUTANEOUS
Refills: 0 | Status: DISCONTINUED | OUTPATIENT
Start: 2021-01-14 | End: 2021-01-16

## 2021-01-14 RX ADMIN — PIPERACILLIN AND TAZOBACTAM 200 GRAM(S): 4; .5 INJECTION, POWDER, LYOPHILIZED, FOR SOLUTION INTRAVENOUS at 12:36

## 2021-01-14 RX ADMIN — SODIUM CHLORIDE 1000 MILLILITER(S): 9 INJECTION INTRAMUSCULAR; INTRAVENOUS; SUBCUTANEOUS at 13:01

## 2021-01-14 RX ADMIN — Medication 101 MILLIGRAM(S): at 13:04

## 2021-01-14 RX ADMIN — PROTHROMBIN COMPLEX CONCENTRATE (HUMAN) 1500 INTERNATIONAL UNIT(S): 25.5; 16.5; 24; 22; 22; 26 POWDER, FOR SOLUTION INTRAVENOUS at 13:04

## 2021-01-14 RX ADMIN — INSULIN HUMAN 5 UNIT(S): 100 INJECTION, SOLUTION SUBCUTANEOUS at 12:35

## 2021-01-14 RX ADMIN — Medication 650 MILLIGRAM(S): at 11:44

## 2021-01-14 RX ADMIN — Medication 4 UNIT(S): at 23:54

## 2021-01-14 RX ADMIN — Medication 40 MILLIGRAM(S): at 22:10

## 2021-01-14 RX ADMIN — ATORVASTATIN CALCIUM 40 MILLIGRAM(S): 80 TABLET, FILM COATED ORAL at 22:10

## 2021-01-14 RX ADMIN — Medication 3: at 22:35

## 2021-01-14 RX ADMIN — PROTHROMBIN COMPLEX CONCENTRATE (HUMAN) 400 INTERNATIONAL UNIT(S): 25.5; 16.5; 24; 22; 22; 26 POWDER, FOR SOLUTION INTRAVENOUS at 12:51

## 2021-01-14 RX ADMIN — INSULIN GLARGINE 7 UNIT(S): 100 INJECTION, SOLUTION SUBCUTANEOUS at 22:35

## 2021-01-14 RX ADMIN — SODIUM CHLORIDE 1000 MILLILITER(S): 9 INJECTION INTRAMUSCULAR; INTRAVENOUS; SUBCUTANEOUS at 11:43

## 2021-01-14 RX ADMIN — SODIUM CHLORIDE 1000 MILLILITER(S): 9 INJECTION INTRAMUSCULAR; INTRAVENOUS; SUBCUTANEOUS at 12:59

## 2021-01-14 NOTE — H&P ADULT - NSHPPHYSICALEXAM_GEN_ALL_CORE
T(C): 36.4 (01-14-21 @ 15:36), Max: 38 (01-14-21 @ 10:45)  HR: 72 (01-14-21 @ 15:36) (60 - 97)  BP: 104/52 (01-14-21 @ 15:36) (103/59 - 180/78)  RR: 20 (01-14-21 @ 15:36) (16 - 20)  SpO2: 98% (01-14-21 @ 15:36) (93% - 99%)  Wt(kg): --Vital Signs Last 24 Hrs  T(C): 36.4 (14 Jan 2021 15:36), Max: 38 (14 Jan 2021 10:45)  T(F): 97.6 (14 Jan 2021 15:36), Max: 100.4 (14 Jan 2021 10:45)  HR: 72 (14 Jan 2021 15:36) (60 - 97)  BP: 104/52 (14 Jan 2021 15:36) (103/59 - 180/78)  BP(mean): --  RR: 20 (14 Jan 2021 15:36) (16 - 20)  SpO2: 98% (14 Jan 2021 15:36) (93% - 99%)    PHYSICAL EXAM:  GENERAL: in some mild distress, confused, but not-toxic appearing  HEAD:  Atraumatic, Normocephalic  EYES: EOMI, PERRLA, conjunctival hemorrhage right pupil inferiorly  ENMT: No tonsillar erythema, exudates, or enlargement; Moist mucous membranes, No lesions  NECK: Supple, No JVD, Normal thyroid  NERVOUS SYSTEM:  Alert & Oriented X3, Good concentration; Motor Strength 2/5 B/L upper and lower extremities;  CHEST/LUNG: right lower lung crackles, good inspiratory effort. no wheezing, rales, rhonchi  HEART: Regular rate and rhythm; No murmurs, rubs, or gallops  ABDOMEN: Soft, Nontender, Nondistended; Bowel sounds present  EXTREMITIES:  2+ Peripheral Pulses, No clubbing, cyanosis, or edema  SKIN: No rashes or lesions T(C): 36.4 (01-14-21 @ 15:36), Max: 38 (01-14-21 @ 10:45)  HR: 72 (01-14-21 @ 15:36) (60 - 97)  BP: 104/52 (01-14-21 @ 15:36) (103/59 - 180/78)  RR: 20 (01-14-21 @ 15:36) (16 - 20)  SpO2: 98% (01-14-21 @ 15:36) (93% - 99%)  Wt(kg): --Vital Signs Last 24 Hrs  T(C): 36.4 (14 Jan 2021 15:36), Max: 38 (14 Jan 2021 10:45)  T(F): 97.6 (14 Jan 2021 15:36), Max: 100.4 (14 Jan 2021 10:45)  HR: 72 (14 Jan 2021 15:36) (60 - 97)  BP: 104/52 (14 Jan 2021 15:36) (103/59 - 180/78)  BP(mean): --  RR: 20 (14 Jan 2021 15:36) (16 - 20)  SpO2: 98% (14 Jan 2021 15:36) (93% - 99%)    PHYSICAL EXAM:  GENERAL: in some mild distress, confused, but not-toxic appearing  HEAD:  Atraumatic, Normocephalic  EYES: EOMI, PERRLA, conjunctival hemorrhage right pupil inferiorly  ENMT: No tonsillar erythema, exudates, or enlargement; Moist mucous membranes, No lesions  NECK: Supple, No JVD, Normal thyroid  NERVOUS SYSTEM:  Alert & Oriented X2, Good concentration; Motor Strength 2/5 B/L upper and lower extremities;  CHEST/LUNG: right lower lung crackles, good inspiratory effort. no wheezing, rales, rhonchi  HEART: Regular rate and rhythm; No murmurs, rubs, or gallops  ABDOMEN: Soft, Nontender, Nondistended; Bowel sounds present  EXTREMITIES:  2+ Peripheral Pulses, No clubbing, cyanosis, or edema  SKIN: No rashes or lesions

## 2021-01-14 NOTE — H&P ADULT - PROBLEM SELECTOR PROBLEM 3
Supratherapeutic INR Type 2 diabetes mellitus with hyperglycemia, without long-term current use of insulin

## 2021-01-14 NOTE — ED ADULT NURSE NOTE - CHIEF COMPLAINT QUOTE
Patient brought to ER by EMS as a transfer from [facility] for positive subdural hematoma. Pt has had difficulty ambulating and multiple falls. Pt has a right and left 20 gauge anteculbital IV locks. Pt received KCentra and Vitamin K. Needs a repeat INR.  and received coverage. Pt had a fever and cough and was given Tylenol,

## 2021-01-14 NOTE — CONSULT NOTE ADULT - SUBJECTIVE AND OBJECTIVE BOX
NEUROSURGERY CONSULT    HPI: 89y Female transfer from [facility] with history multiple falls. Patient found to have subdural collections on CTH at [place] this morning. Patient transferred to [facility] for further care. She also has a UTI.     RADIOLOGY:     < from: CT Head No Cont (01.14.21 @ 11:30) >  Bilateral convexity hypodense subdural collections, measuring 8 mm on the right and 5 mm on the left. Right-to-left midline shift measuring 3 mm. No hydrocephalus or effacement of basal cisterns. Chronic bilateral cerebellar lacunar infarcts. There are areas of hypodensity in the bilateral hemispheric white matter suggesting white matter microvascular ischemic change. There is cerebral volume loss.    There is no displaced calvarial fracture. Status post bilateral intraocular lens implants. The visualized portions of the paranasal sinuses are well aerated. The mastoid air cells are well aerated.      IMPRESSION: Bilateral convexity hypodense subdural collections, measuring 8 mm on the right and 5 mm on the left. Right-to-left midline shift measuring 3 mm. No hydrocephalus or effacement of basal cisterns.    These findings were discussed with Dr. IDALMIS HOANG [phone] at 1/14/2021 12:08 PM by Dr. Raza Ortiz with read back confirmation.    PHYSICAL EXAM:  Vital Signs Last 24 Hrs  T(C): 36.4 (14 Jan 2021 15:36), Max: 38 (14 Jan 2021 10:45)  T(F): 97.6 (14 Jan 2021 15:36), Max: 100.4 (14 Jan 2021 10:45)  HR: 72 (14 Jan 2021 15:36) (60 - 97)  BP: 104/52 (14 Jan 2021 15:36) (103/59 - 180/78)  RR: 20 (14 Jan 2021 15:36) (16 - 20)  SpO2: 98% (14 Jan 2021 15:36) (93% - 99%)    AOx3, appropriate, intermittently follows commands, agitated and rambling   PERRL, EOMI, face symmetrical   ZABALA x 4 antigravity   Sensation intact to light touch   + L pronator drift     LABS:                        12.9   35.30 )-----------( 197      ( 14 Jan 2021 11:46 )             46.9     01-14    131<L>  |  93<L>  |  28<H>  ----------------------------<  517<HH>  4.3   |  29  |  1.28    Ca    10.0      14 Jan 2021 11:46    TPro  6.4  /  Alb  3.1<L>  /  TBili  1.4<H>  /  DBili  x   /  AST  26  /  ALT  23  /  AlkPhos  233<H>  01-14    PT/INR - ( 14 Jan 2021 11:46 )   PT: >150.0 sec;   INR: >14.99 ratio         PTT - ( 14 Jan 2021 11:46 )  PTT:97.9 sec

## 2021-01-14 NOTE — H&P ADULT - NSHPSOCIALHISTORY_GEN_ALL_CORE
Denies smoking, alcohol, illicit drug use. lives at home with granddaughter. Housewife in the past. 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

## 2021-01-14 NOTE — H&P ADULT - PROBLEM SELECTOR PLAN 3
INR >15, unclear etiology but likely in the setting of decreased PO intake due to cystitis or other etiology  -reversed with kcentra and vitamin K with improvement in INR  -will also give 1u FFP tonight  -daily INR and hold coumadin for 2 weeks INR greater than 14.99, unclear etiology but likely in the setting of decreased PO intake due to cystitis or other etiology  -not fully reversed with kcentra likely because INR greater than 14.99. vitamin K with improvement in INR, but not reversed just yet.  -will also give 1u FFP tonight to bring INR down  -daily INR and hold coumadin for 2 weeks hyperglycemic to 300s-400s. Last A1c 7.6  -Weight based insulin: Lantus 7 now + ISS  -CC diet

## 2021-01-14 NOTE — H&P ADULT - PROBLEM SELECTOR PLAN 5
WBC 35  -f/u bcx  -f/u ucx  -c/w zosyn for now WBC 35, likely 2/2 to UTI and subdural hemorrhage  -will give Zosyn for UTI now, no need for vanc  -f/u bcx  -f/u ucx WBC 35, likely 2/2 to UTI and subdural hemorrhage. Also has myeloproliferative disorder, on hydroxyurea   -will give Zosyn for UTI now, no need for vanc  -f/u bcx  -f/u ucx Pt found to have UA with + leuk esterase, negative nitrites, WBC 10, and CBC showed WBC of 35 and confused state with falls, overall concerning for cystitis  -will treat with Zosyn given pt is critically ill, on hydroxyurea.  -got Zosyn in ED, will c/w Zosyn q12h  -f/u UCx and de-escalate as needed  -f/u BCx Pt found to have UA with + leuk esterase, negative nitrites, WBC 10, and CBC showed WBC of 35 and confused state with falls, overall concerning for cystitis  -will treat initially empirically with Zosyn given that critically ill,  in the setting of immunosuppression and immuno compromised status (on hydroxyurea);  -f/u UCx and de-escalate as needed  -f/u BCx

## 2021-01-14 NOTE — ED PROVIDER NOTE - LAB INTERPRETATION
FLU A B RSV Detection by PCR (01.14.21 @ 12:17)   Flu A Result: NotDetec: The Flu A B RSV assay is a Real-Time PCR test for the qualitative   detection and differentiation of Influenza A, Influenza B, and   Respiratory Syncytial Virus on nasopharyngeal swabs. The results should   be interpreted in the context of all clinical and laboratory findings.   Flu B Result: NotDetec   RSV Result: NotDetec

## 2021-01-14 NOTE — ED PROVIDER NOTE - PROGRESS NOTE DETAILS
[facility] center called d/w julian ([facility] neurosurg) and lluvia ([facility] Neurosurg), relate nonsurg will accept xfer to [facility] ER d/w kali ([facility] ER) will accept xfer er->er d/w son, he consents to xfer to tertiary hospital, he requests [facility] if possible,, or [facility] if not. [facility] center called

## 2021-01-14 NOTE — ED PROVIDER NOTE - NS ED ROS FT
General: denies fever, chills, weight loss/weight gain.  HENT: denies nasal congestion, sore throat, rhinorrhea, ear pain.  Neck: denies neck pain, neck swelling.  CV: denies chest pain, palpitations.  Resp: denies difficulty breathing, cough.  Abdominal: denies nausea, vomiting, diarrhea, abdominal pain, blood in stool, dark stool.  MSK: denies muscle aches, bony pain, leg pain, leg swelling.  Neuro: +confusion; denies headaches, numbness, tingling, dizziness, lightheadedness.  Skin: denies rashes, cuts, bruises.  Hematologic: denies unexplained bruises.
Statement Selected

## 2021-01-14 NOTE — ED ADULT TRIAGE NOTE - CHIEF COMPLAINT QUOTE
" She has difficulty walking x 3 days, worst now, mental capacity not good x 2 days"  Accucheck by   Accucheck 494/ Rpt 488

## 2021-01-14 NOTE — ED PROVIDER NOTE - PROGRESS NOTE DETAILS
Fahad PGY2 - NSx states that if interval CT is unremarkable, pt. will be stable for dc from their end.  Spoke w/ son and conveyed that pt. will likely stay as confusion could be 2/2 UTI.  Progressively worsening balance also concerning for dc.

## 2021-01-14 NOTE — H&P ADULT - NSHPLABSRESULTS_GEN_ALL_CORE
LABS:  The Labs were reviewed by me   The Radiology was reviewed by me    EKG tracing reviewed by me        131<L>  |  93<L>  |  28<H>  ----------------------------<  517<HH>  4.3   |  29  |  1.28    Ca    10.0      2021 11:46    TPro  6.4  /  Alb  3.1<L>  /  TBili  1.4<H>  /  DBili  x   /  AST  26  /  ALT  23  /  AlkPhos  233<H>      PT/INR - ( 2021 16:31 )   PT: 25.5 sec;   INR: 2.33 ratio         PTT - ( 2021 16:31 )  PTT:46.5 sec              Urinalysis Basic - ( 2021 12:40 )    Color: Yellow / Appearance: Slightly Turbid / S.020 / pH: x  Gluc: x / Ketone: Negative  / Bili: Small / Urobili: 4 mg/dL   Blood: x / Protein: 100 mg/dL / Nitrite: Negative   Leuk Esterase: Trace / RBC: 6-10 /HPF / WBC 6-10   Sq Epi: x / Non Sq Epi: Few / Bacteria: Moderate                        12.9   35.30 )-----------( 197      ( 2021 11:46 )             46.9     CAPILLARY BLOOD GLUCOSE    POCT Blood Glucose.: 381 mg/dL (2021 19:45)  POCT Blood Glucose.: 367 mg/dL (2021 16:22)  POCT Blood Glucose.: 488 mg/dL (2021 10:34)  POCT Blood Glucose.: 494 mg/dL (2021 10:31    Lactate, Blood: 3.8 mmol/L (21 @ 11:46)  RADIOLOGY & ADDITIONAL TESTS:  < from: CT Head No Cont (21 @ 18:00) >  IMPRESSION:  Small right-sided holohemispheric chronic subdural collection measuring up to 8 mm is unchanged. Tiny left-sided chronic subdural collection overlying the left frontoparietal lobes measuring up to 5 mm is unchanged. No acute hemorrhagic component is present. There is no significant mass effect on the underlying parenchyma.  < end of copied text >    < from: CT Head No Cont (21 @ 11:30) >  IMPRESSION: Bilateral convexity hypodense subdural collections, measuring 8 mm on the right and 5 mm on the left. Right-to-left midline shift measuring 3 mm. No hydrocephalus or effacement of basal cisterns.  These findings were discussed with Dr. IDALMIS HOANG [phone] at 2021 12:08 PM by Dr. Raza Ortiz with read back confirmation.  < end of copied text >    < from: Xray Chest 1 View- PORTABLE-Routine (Xray Chest 1 View- PORTABLE-Routine .) (21 @ 11:19) >  PROCEDURE DATE:  2021   INTERPRETATION:  Weakness.  AP chest. Prior 2020.  Heart magnified by AP film shallow inspiration. Mild generalized interstitial prominence probably represents mild congestion. No focal consolidation or pleural effusion.  Impression: Probable mild interstitial edema. No focal infiltrates.  < end of copied text >    Imaging Personally Reviewed:  [x] YES  [ ] NO    EKG: ventricular paced rhythm LABS:      EKG, 21, [facility], v-paced, 75bpm, qtc 477 - my reading     CXR , clear lungs, mild vasc congestion, no pleural effusions - my reading         131<L>  |  93<L>  |  28<H>  ----------------------------<  517<HH>  4.3   |  29  |  1.28    Ca    10.0      2021 11:46    TPro  6.4  /  Alb  3.1<L>  /  TBili  1.4<H>  /  DBili  x   /  AST  26  /  ALT  23  /  AlkPhos  233<H>      PT/INR - ( 2021 16:31 )   PT: 25.5 sec;   INR: 2.33 ratio         PTT - ( 2021 16:31 )  PTT:46.5 sec              Urinalysis Basic - ( 2021 12:40 )    Color: Yellow / Appearance: Slightly Turbid / S.020 / pH: x  Gluc: x / Ketone: Negative  / Bili: Small / Urobili: 4 mg/dL   Blood: x / Protein: 100 mg/dL / Nitrite: Negative   Leuk Esterase: Trace / RBC: 6-10 /HPF / WBC 6-10   Sq Epi: x / Non Sq Epi: Few / Bacteria: Moderate                        12.9   35.30 )-----------( 197      ( 2021 11:46 )             46.9     CAPILLARY BLOOD GLUCOSE    POCT Blood Glucose.: 381 mg/dL (2021 19:45)  POCT Blood Glucose.: 367 mg/dL (2021 16:22)  POCT Blood Glucose.: 488 mg/dL (2021 10:34)  POCT Blood Glucose.: 494 mg/dL (2021 10:31    Lactate, Blood: 3.8 mmol/L (21 @ 11:46)      < from: CT Head No Cont (21 @ 18:00) >  IMPRESSION:  Small right-sided holohemispheric chronic subdural collection measuring up to 8 mm is unchanged. Tiny left-sided chronic subdural collection overlying the left frontoparietal lobes measuring up to 5 mm is unchanged. No acute hemorrhagic component is present. There is no significant mass effect on the underlying parenchyma.  < end of copied text >    < from: CT Head No Cont (21 @ 11:30) >  IMPRESSION: Bilateral convexity hypodense subdural collections, measuring 8 mm on the right and 5 mm on the left. Right-to-left midline shift measuring 3 mm. No hydrocephalus or effacement of basal cisterns.  These findings were discussed with Dr. IDALMIS HOANG [id number] at 2021 12:08 PM by Dr. Raza Ortiz with read back confirmation.  < end of copied text >      Imaging Personally Reviewed:  [x] YES  [ ] NO

## 2021-01-14 NOTE — ED ADULT TRIAGE NOTE - CHIEF COMPLAINT QUOTE
Patient brought to ER by EMS as a transfer from [facility] for positive subdural hematoma. Pt has had difficulty ambulating and multiple falls. Pt has a right and left 20 gauge anteculbital IV locks. Pt received KCentra and Vitamin K. Needs a repeat INR.  and received coverage. Pt had a fever and cough and was given Tylenol,

## 2021-01-14 NOTE — ED PROVIDER NOTE - CLINICAL SUMMARY MEDICAL DECISION MAKING FREE TEXT BOX
89F transferred from OSH for SDH.  Left pupil 2mm, right pupil 4mm but PERRL.  Unable to lift RLE, but states this is chronic.  Neuro intact otherwise.  Will repeat INR, CTh, consult neurosurgery.  Received zosyn for UTI.  Will likely admit to medicine for AMS 2/2 UTI if interval CTh unremarkable.

## 2021-01-14 NOTE — H&P ADULT - PROBLEM SELECTOR PLAN 2
hyperglycemic to 300s-400s. Last A1c 7.6  -Weight based insulin: Lantus 7 now + ISS  -CC diet INR greater than 14.99, unclear etiology but likely in the setting of decreased PO intake due to cystitis or other etiology  -not fully reversed with kcentra likely because INR greater than 14.99. vitamin K with improvement in INR, but not reversed just yet.  -will also give 1u FFP tonight to bring INR down  -daily INR and hold coumadin for 2 weeks INR greater than 14.99, unclear etiology but likely in the setting of decreased PO intake due to cystitis or other etiology; possible unintentional OD;  -INR not fully reversed with kcentra likely due to INR greater than 14.99 (not 15) vitamin K with improvement in INR, but not fully reversed  -will give 1u FFP tonight to achieve full INR reversal  -daily INR  - hold coumadin for > weeks, primary team to f/u final NSx recs prior to d/c home

## 2021-01-14 NOTE — H&P ADULT - NSHPREVIEWOFSYSTEMS_GEN_ALL_CORE
REVIEW OF SYSTEMS: limited due to pt confused state. asked son for collateral  CONSTITUTIONAL: No fever. + fatigue  ENMT:  + difficulty hearing, negative tinnitus, vertigo; No sinus or throat pain  NECK: No pain or stiffness  RESPIRATORY: No cough, wheezing, chills or hemoptysis; No shortness of breath  CARDIOVASCULAR: No chest pain, palpitations, dizziness, or leg swelling  GASTROINTESTINAL: No abdominal or epigastric pain. No nausea, vomiting, or hematemesis; No diarrhea or constipation. No melena or hematochezia.  GENITOURINARY: No dysuria, frequency, hematuria, or incontinence  NEUROLOGICAL: No headaches  SKIN: No itching, burning, rashes, or lesions   ENDOCRINE: No heat or cold intolerance  MUSCULOSKELETAL: No joint pain or swelling; No muscle, back, or extremity pain  HEME/LYMPH: No easy bruising, or bleeding gums

## 2021-01-14 NOTE — H&P ADULT - PROBLEM SELECTOR PLAN 10
Diet: dysphagia I, patient seemed to have been coughing while having food today, will get speech and swallow  DVT PPx: SCDs  Dispo: pending workup, PT consult, OT consult,   GOC: spoke with son and HCP, Chuy, would like mother to be full code at this time Diet: dysphagia I, patient seemed to have been coughing while having food today, will get speech and swallow  DVT PPx: SCDs  Dispo: pending workup, PT consult, OT consult, [facility]: spoke with son and HCP, Chuy, would like mother to be full code at this time    #Elevated billirubin: likely from hydroxyurea, likely has baseline hemolysis

## 2021-01-14 NOTE — ED ADULT NURSE NOTE - OBJECTIVE STATEMENT
patient alert to person place time disoriented to situation. transfer from [place] ED for subdural hematoma. Patient son states multiple mechanical falls "few days". taking warfarin. patient received Kcentra and vit.K enroute to [facility] ED. 20G IV left AC 20G right AC patent flushed with saline. placed on cardiac monitor. breathing even unlabored patient able to speak full sentences. Moving all four extremities. Denies headache, chest pain, SOB. Left pupil  2mm reactive to light. Right pupil 3mm reactive to light. denies nausea.  Fall precautions in place. Safety education reinforced with call bell within reach. Verbalizes understanding of use. Will continue to monitor.

## 2021-01-14 NOTE — CONSULT NOTE ADULT - ASSESSMENT
89yo female with b/l subdural collections with hx coumadin use, INR 14 at [place], reversed w kcentra     PLAN:   - repeat INR now   - repeat CTH     Case discussed with attending neurosurgeon.   89yo female with b/l subdural collections with hx coumadin use, INR 14 at [place], reversed w kcentra     PLAN:   - repeat INR now   - repeat CTH   - hold all AC - will need to be off full AC likely 2 weeks     Case discussed with attending neurosurgeon.

## 2021-01-14 NOTE — ED ADULT NURSE REASSESSMENT NOTE - NS ED NURSE REASSESS COMMENT FT1
1/14/2021- Lab work done and resulted, CT performed with + findings, K-Centrra infusing as ordered, started on IV abx. Pt will be transfer to [facility] foe neuro consults, no acute changes noted form previous assessment will continue to monitor. WENDY chopra
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
## 2021-01-14 NOTE — ED PROVIDER NOTE - OBJECTIVE STATEMENT
89F w/ PMHx of afib, HTN, DM, HLD, myeloproliferative disease transferred from [facility] for SDH.  Pt. is a poor historian and states she hasn't fell.  Per chart review from records at [facility], pt. has had multiple falls over the last few weeks.  Son states he does not believe she's had falls, but has had progressively worsening balance.  Son, Chuy states there has been no traumatic incident recently that could explain the SDH.  Pt. has a UTI and was given zosyn at OSH.  AAOx3, but is confused.

## 2021-01-14 NOTE — ED ADULT NURSE NOTE - NSIMPLEMENTINTERV_GEN_ALL_ED
Implemented All Fall with Harm Risk Interventions:  [place] to call system. Call bell, personal items and telephone within reach. Instruct patient to call for assistance. Room bathroom lighting operational. Non-slip footwear when patient is off stretcher. Physically safe environment: no spills, clutter or unnecessary equipment. Stretcher in lowest position, wheels locked, appropriate side rails in place. Provide visual cue, wrist band, yellow gown, etc. Monitor gait and stability. Monitor for mental status changes and reorient to person, place, and time. Review medications for side effects contributing to fall risk. Reinforce activity limits and safety measures with patient and family. Provide visual clues: red socks.

## 2021-01-14 NOTE — ED PROVIDER NOTE - CARE PLAN
Principal Discharge DX:	Fever  Secondary Diagnosis:	Weakness   Principal Discharge DX:	SDH (subdural hematoma)  Secondary Diagnosis:	Weakness  Secondary Diagnosis:	Fever   Principal Discharge DX:	SDH (subdural hematoma)  Secondary Diagnosis:	Weakness  Secondary Diagnosis:	Fever  Secondary Diagnosis:	Warfarin-induced coagulopathy

## 2021-01-14 NOTE — ED ADULT NURSE NOTE - OBJECTIVE STATEMENT
Pt. alert forgetful presnted to the ED with elevated blood sugar leg pain and fever pt co some SOB no distress noted. Febrile able to follow simple commands

## 2021-01-14 NOTE — ED PROVIDER NOTE - PHYSICAL EXAMINATION
GENERAL: Patient awake alert NAD.  HEENT: NC/AT, Moist mucous membranes, PERRL, Right eye conjunctival hemorrhage, 2mm left pupil, 4mm right pupil,  LUNGS: CTAB, no wheezes or crackles.  CARDIAC: RRR, no m/r/g.  ABDOMEN: Soft, NT, ND, No rebound, guarding.  EXT: No edema. No calf tenderness. CV 2+DP/PT bilaterally.   MSK: No pain with movement, no deformities.  NEURO: A&Ox3. Unable to lift RLE. Motor strength 5/5 in b/l UEs.  SKIN: Warm and dry. No rash.  PSYCH: Normal affect.

## 2021-01-14 NOTE — H&P ADULT - NSHPOUTPATIENTPROVIDERS_GEN_ALL_CORE
Dr. Trini Chaney  Oncologist: Dr. Burgess  Cardiologist: Dr. Lee PCP: Dr. Trini Chaney  Oncologist: Dr. Burgess; Cardiologist: Dr. Lee

## 2021-01-14 NOTE — H&P ADULT - HISTORY OF PRESENT ILLNESS
JOURDAN LARSON  89y  Female  Patient is a 89y old  Female who presents with a chief complaint of subdural hematoma    89F w/ PMHx of afib, HTN, DM, HLD, myeloproliferative disease (in remission) transferred from [facility] for SDH.  Pt. is a poor historian and states, although she is A&Ox3 but confused. She reports she has been feeling weak but did not fall down at all.     Collateral obtained from son, Chuy [phone]. He reports that for the past couple of weeks, she has been "deteriorating" and have been having difficulty walking, thinking, somewhat forgetful, and decreased po intake. He also reports that today he went to see her, and she was "shaking" and was in pain and thus called the ambulance. Son reports that she has not fallen, but has had imbalance. She lives with her granddaughter, who reports that she has not fallen. Son denies any fevers, abdominal pain, shortness of breath.    She was brought to the OSH, found to have subdural hematoma 8mm on the R side and 5mm on the L side, with midline shift of 3mm. She was found to have INR>15, UA+ with leuk esterase - nitrites, wbc 6-10. He was given Zosyn, 2L NS, humulin 5 IV, Kcentra, vit K, tylenol 650. Bcx and UCx drawn. Pt then transferred to [facility] ED for further neurosurgery evaluation. At [facility] ED, repeat CT was stable, repeat INR was 2.33 89yo female w/ MHx of afib (on a/c), HTN, DM Type 2, HLD, myeloproliferative disease (in remission) transferred from [facility] for SDH.  Pt is a poor historian and states, although she is A&Ox3 but confused. She reports she has been feeling weak but did not fall down at all.   Collateral obtained from son, Chuy [phone]. He reports that for the past couple of weeks, she has been "deteriorating" and have been having difficulty walking, thinking, somewhat forgetful, and decreased po intake. He also reports that today he went to see her, and she was "shaking" and was in pain and thus called the ambulance. Son reports that she has not fallen, but has had imbalance. She lives with her granddaughter, who reports that she has not fallen. Son reports no observed fevers, c/o abdominal pain or shortness of breath.  She was brought to the OSH, found to have subdural hematoma 8mm on the R side and 5mm on the L side, with midline shift of 3mm. She was found to have INR grater than 14.99,     ED course: Zosyn, 2L NS, humulin 5 IV, Kcentra, vit K, Tylenol 650. Bcx and UCx drawn. Pt then transferred to [facility] ED for further neurosurgery evaluation.   At [facility] ED, repeat CT was stable, repeat INR was 2.33 89yo F, with Afib on coumadin, Myeloproliferative disorder (on hydroxyurea), HTN, HLD, CAD s/p prior MI (9 years ago) as per prior documentation: known chronically occluded RCA, s/p PCI to prox LAD, normal LV, Type 2 DM; Pt transferred from [facility] for SDH.  Pt is a poor historian. She reports she has been feeling weak but did not fall down at all. Collateral obtained from son, Chuy [phone]. He reports that for the past couple of weeks, she has been "deteriorating" and have been having difficulty walking, thinking, somewhat forgetful, and decreased po intake. He also reports that today he went to see her, and she was "shaking" and was in pain and thus called the ambulance. Son reports that she has not fallen, but has had imbalance. She lives with her granddaughter, who reports that she has not fallen. Son reports no observed fevers, c/o abdominal pain or shortness of breath.  She was brought to the OSH, found to have subdural hematoma 8mm on the R side and 5mm on the L side, with midline shift of 3mm. She was found to have INR grater than 14.99,     ED course: Zosyn, 2L NS, humulin 5 IV, Kcentra, vit K, Tylenol 650. Bcx and UCx drawn. Pt then transferred to [facility] ED for further neurosurgery evaluation.   At [facility] ED, repeat CT was stable, repeat INR was 2.33

## 2021-01-14 NOTE — H&P ADULT - ASSESSMENT
89F w/ PMHx of afib, HTN, DM, HLD, myeloproliferative disease (in remission, now on hydroxyurea) transferred from [facility] for SDH workup, found to have UTI, supratherapeutic INR (>15), hyperglycemia, and severe weakness.  89yo F, with Afib on coumadin, Myeloproliferative disorder (on hydroxyurea), HTN, HLD, CAD s/p prior MI (9 years ago) as per prior documentation: known chronically occluded RCA, s/p PCI to prox LAD, normal LV, Type 2 DM; Pt transferred from [facility] for Nsx evaluation given CT head (+) SDH; a/w SDH in the context of supra-therapeutic INR (greater 14.99), hyperglycemia, and gen weakness; Found to have UTI in the setting of immunosuppression and immuno compromised status;

## 2021-01-14 NOTE — H&P ADULT - PROBLEM SELECTOR PLAN 4
Pt found to have UA with + leuk esterase, negative nitrites, WBC 10, and CBC showed WBC of 35 and confused state with falls, overall concerning for cystitis  -got Zosyn in ED, will c/w Zosyn q12h  -f/u UCx and de-escalate as needed  -f/u BCx Pt found to have UA with + leuk esterase, negative nitrites, WBC 10, and CBC showed WBC of 35 and confused state with falls, overall concerning for cystitis  -will treat with Zosyn given pt is critically ill, on hydroxyurea.  -got Zosyn in ED, will c/w Zosyn q12h  -f/u UCx and de-escalate as needed  -f/u BCx Hx of Heart failure with preserved EF, last TTE 10/2020 normal LV function w/ EF60%, mild MS, mild chordal JUSTUS, mild AS, and a prominent septal bulge  -somewhat overtly overloaded, w/ crackles at right lung bases and congestion on CXR  -c/w Lasix 40mg daily  -c/w metoprolol tartrate 50 BID  -of note, pt has  Medtronic Micra PPM placed in April 2020.  -we not suspect ACS, mild HF, echo recent, no need for repeat echo mild acute on chronic dHF, echo recent, no need  to repeat  Hx of Heart failure with preserved EF, last TTE 10/2020 normal LV function w/ EF60%, mild MS, mild chordal JUSTUS, mild AS, and a prominent septal bulge  -somewhat overtly overloaded, w/ crackles at right lung bases  - vascular congestion on CXR  -c/w Lasix 40mg daily  -c/w metoprolol tartrate 50 BID  -of note, pt has  Medtronic Micra PPM placed in April 2020.  - low suspicion of ACS  -no need for Tele at this time

## 2021-01-14 NOTE — H&P ADULT - PROBLEM SELECTOR PLAN 9
Hx of CAD w/ significant stenosis in the proximal LAD, and is s/p PCI with Synergy stents in July of 2019.  -holding aspirin 81 in the setting of bleed Hx of CAD w/ significant stenosis in the proximal LAD, and is s/p PCI with Synergy stents in July of 2019.  -holding aspirin 81 in the setting of bleed  -restarting/holding ASA to be discussed with NSx and Cardiology respectively(primary team)

## 2021-01-14 NOTE — H&P ADULT - NSICDXPASTMEDICALHX_GEN_ALL_CORE_FT
PAST MEDICAL HISTORY:  Anal fissure     Atrial fibrillation on coumadin    Coronary artery disease : found to have a significant stenosis in the proximal LAD, and is s/p PCI with Synergy stents in July of 2019    Elevated red blood cell count     Hearing loss     HTN (hypertension)     Hypercholesterolemia     Myeloproliferative disease     Myocardial infarction 8 yrs ago    Skin cancer of back - removed    Type 2 diabetes mellitus not on insulin

## 2021-01-14 NOTE — H&P ADULT - PROBLEM SELECTOR PLAN 1
Pt presented with INR>15 and found to have subdural hematoma (8mm on R, and 5mm on L with 3mm midline shift) which is stable on repeat CT  -s/p vit K, kCentra in OSH with INR >15, improving to 2.33  -will give 1u FFP this PM in addition  -neurosurgery recs appreciated:  no AC for 2 weeks Pt presented initially with INR greater than 14.99; Found to have subdural hematoma (8mm on R, and 5mm on L with 3mm midline shift) which is stable on repeat CT  -s/p vit K, kCentra in OSH  ([facility]) with INR >15, improving to 2.33  -1u FFP now pending to reverse INR  - neurosurgery recs appreciated:  n/o AC for >2 weeks  - holding ASA

## 2021-01-14 NOTE — ED PROVIDER NOTE - ATTENDING CONTRIBUTION TO CARE
89F with hx of afib on coumadin, htn, dm, hld, transferred from [place] for NSx eval secondary to SDH. patient with multiple falls recently. patient offers no complaints.    ***GEN - NAD; well appearing; A+O x2 ***HEAD - NC/AT ***EYES/NOSE - PERRL, EOMI, mucous membranes moist, no discharge ***THROAT: Oral cavity and pharynx normal. No inflammation, swelling, exudate, or lesions.  ***NECK: Neck supple, non-tender without lymphadenopathy, no masses, no thyromegaly.   ***PULMONARY - CTA b/l, symmetric breath sounds. ***CARDIAC -s1s2, RRR, no M,G,R  ***ABDOMEN - +BS, ND, NT, soft, no guarding, no rebound, no masses   ***BACK - no CVA tenderness, Normal  spine ***EXTREMITIES - symmetric pulses, 2+ dp, capillary refill < 2 seconds, no clubbing, no cyanosis, no edema ***SKIN - no rash or bruising   ***NEUROLOGIC - alert, CN 2-12 intact    MDM: 89F with SDH s/p KCentra. NSx eval, rpt CTH, labs.

## 2021-01-14 NOTE — H&P ADULT - PROBLEM SELECTOR PLAN 7
Pt on coumadin at home for AC, and metoprolol tartrate 50 BID for rate control  -holding coumadin for 2 weeks, as per neurosurgery  -c/w metoprolol 50 BID

## 2021-01-14 NOTE — H&P ADULT - PROBLEM SELECTOR PROBLEM 2
Type 2 diabetes mellitus with hyperglycemia, without long-term current use of insulin Supratherapeutic INR

## 2021-01-14 NOTE — ED PROVIDER NOTE - OBJECTIVE STATEMENT
pt bib ems for generalized weakness, diff ambulating past few days, with some confusion. pt poor historian, but denies fever, ha, cp, sob, cough, abd pain, vomiting, diarrhea, dysuria.  pmd - jose antonio

## 2021-01-14 NOTE — H&P ADULT - PROBLEM SELECTOR PLAN 6
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 Also has myeloproliferative disorder, on hydroxyurea   -will give Zosyn for UTI now, no need for vanc  -f/u bcx  -f/u ucx WBC 35K, due to myeloproliferative disorder, on hydroxyurea;   -will treat UTI empirically   -f/u bcx  -f/u ucx

## 2021-01-15 LAB
ALBUMIN SERPL ELPH-MCNC: 3.4 G/DL — SIGNIFICANT CHANGE UP (ref 3.3–5)
ALP SERPL-CCNC: 211 U/L — HIGH (ref 40–120)
ALT FLD-CCNC: 20 U/L — SIGNIFICANT CHANGE UP (ref 4–33)
ANION GAP SERPL CALC-SCNC: 18 MMOL/L — HIGH (ref 7–14)
AST SERPL-CCNC: 44 U/L — HIGH (ref 4–32)
BASOPHILS # BLD AUTO: 0 K/UL — SIGNIFICANT CHANGE UP (ref 0–0.2)
BASOPHILS NFR BLD AUTO: 0 % — SIGNIFICANT CHANGE UP (ref 0–2)
BILIRUB SERPL-MCNC: 1.4 MG/DL — HIGH (ref 0.2–1.2)
BUN SERPL-MCNC: 38 MG/DL — HIGH (ref 7–23)
CALCIUM SERPL-MCNC: 9.6 MG/DL — SIGNIFICANT CHANGE UP (ref 8.4–10.5)
CHLORIDE SERPL-SCNC: 95 MMOL/L — LOW (ref 98–107)
CO2 SERPL-SCNC: 24 MMOL/L — SIGNIFICANT CHANGE UP (ref 22–31)
CREAT SERPL-MCNC: 1.5 MG/DL — HIGH (ref 0.5–1.3)
CULTURE RESULTS: NO GROWTH — SIGNIFICANT CHANGE UP
EOSINOPHIL # BLD AUTO: 0 K/UL — SIGNIFICANT CHANGE UP (ref 0–0.5)
EOSINOPHIL NFR BLD AUTO: 0 % — SIGNIFICANT CHANGE UP (ref 0–6)
GLUCOSE BLDC GLUCOMTR-MCNC: 136 MG/DL — HIGH (ref 70–99)
GLUCOSE BLDC GLUCOMTR-MCNC: 158 MG/DL — HIGH (ref 70–99)
GLUCOSE BLDC GLUCOMTR-MCNC: 219 MG/DL — HIGH (ref 70–99)
GLUCOSE BLDC GLUCOMTR-MCNC: 231 MG/DL — HIGH (ref 70–99)
GLUCOSE BLDC GLUCOMTR-MCNC: 242 MG/DL — HIGH (ref 70–99)
GLUCOSE SERPL-MCNC: 214 MG/DL — HIGH (ref 70–99)
HCT VFR BLD CALC: 45.3 % — HIGH (ref 34.5–45)
HGB BLD-MCNC: 12.3 G/DL — SIGNIFICANT CHANGE UP (ref 11.5–15.5)
IANC: 27.01 K/UL — HIGH (ref 1.5–8.5)
INR BLD: 1.5 RATIO — HIGH (ref 0.88–1.16)
LYMPHOCYTES # BLD AUTO: 0.87 K/UL — LOW (ref 1–3.3)
LYMPHOCYTES # BLD AUTO: 2.6 % — LOW (ref 13–44)
MAGNESIUM SERPL-MCNC: 1.8 MG/DL — SIGNIFICANT CHANGE UP (ref 1.6–2.6)
MCHC RBC-ENTMCNC: 21.8 PG — LOW (ref 27–34)
MCHC RBC-ENTMCNC: 27.2 GM/DL — LOW (ref 32–36)
MCV RBC AUTO: 80.5 FL — SIGNIFICANT CHANGE UP (ref 80–100)
MONOCYTES # BLD AUTO: 0.57 K/UL — SIGNIFICANT CHANGE UP (ref 0–0.9)
MONOCYTES NFR BLD AUTO: 1.7 % — LOW (ref 2–14)
NEUTROPHILS # BLD AUTO: 31.88 K/UL — HIGH (ref 1.8–7.4)
NEUTROPHILS NFR BLD AUTO: 94.8 % — HIGH (ref 43–77)
NT-PROBNP SERPL-SCNC: HIGH PG/ML
PHOSPHATE SERPL-MCNC: 4.4 MG/DL — SIGNIFICANT CHANGE UP (ref 2.5–4.5)
PLATELET # BLD AUTO: 152 K/UL — SIGNIFICANT CHANGE UP (ref 150–400)
POTASSIUM SERPL-MCNC: 4.3 MMOL/L — SIGNIFICANT CHANGE UP (ref 3.5–5.3)
POTASSIUM SERPL-SCNC: 4.3 MMOL/L — SIGNIFICANT CHANGE UP (ref 3.5–5.3)
PROT SERPL-MCNC: 6.1 G/DL — SIGNIFICANT CHANGE UP (ref 6–8.3)
PROTHROM AB SERPL-ACNC: 16.9 SEC — HIGH (ref 10.6–13.6)
RBC # BLD: 5.63 M/UL — HIGH (ref 3.8–5.2)
RBC # FLD: 24 % — HIGH (ref 10.3–14.5)
SARS-COV-2 IGG SERPL QL IA: NEGATIVE — SIGNIFICANT CHANGE UP
SARS-COV-2 IGM SERPL IA-ACNC: 0.07 INDEX — SIGNIFICANT CHANGE UP
SODIUM SERPL-SCNC: 137 MMOL/L — SIGNIFICANT CHANGE UP (ref 135–145)
SPECIMEN SOURCE: SIGNIFICANT CHANGE UP
TSH SERPL-MCNC: 5.65 UIU/ML — HIGH (ref 0.27–4.2)
WBC # BLD: 33.31 K/UL — HIGH (ref 3.8–10.5)
WBC # FLD AUTO: 33.31 K/UL — HIGH (ref 3.8–10.5)

## 2021-01-15 PROCEDURE — 70450 CT HEAD/BRAIN W/O DYE: CPT | Mod: 26

## 2021-01-15 PROCEDURE — 99233 SBSQ HOSP IP/OBS HIGH 50: CPT | Mod: GC

## 2021-01-15 RX ORDER — ONDANSETRON 8 MG/1
4 TABLET, FILM COATED ORAL ONCE
Refills: 0 | Status: COMPLETED | OUTPATIENT
Start: 2021-01-15 | End: 2021-01-15

## 2021-01-15 RX ORDER — SODIUM CHLORIDE 9 MG/ML
500 INJECTION INTRAMUSCULAR; INTRAVENOUS; SUBCUTANEOUS
Refills: 0 | Status: DISCONTINUED | OUTPATIENT
Start: 2021-01-15 | End: 2021-01-16

## 2021-01-15 RX ORDER — INSULIN GLARGINE 100 [IU]/ML
10 INJECTION, SOLUTION SUBCUTANEOUS AT BEDTIME
Refills: 0 | Status: DISCONTINUED | OUTPATIENT
Start: 2021-01-15 | End: 2021-01-16

## 2021-01-15 RX ORDER — ACETAMINOPHEN 500 MG
1000 TABLET ORAL ONCE
Refills: 0 | Status: COMPLETED | OUTPATIENT
Start: 2021-01-15 | End: 2021-01-15

## 2021-01-15 RX ADMIN — Medication 2: at 12:22

## 2021-01-15 RX ADMIN — INSULIN GLARGINE 10 UNIT(S): 100 INJECTION, SOLUTION SUBCUTANEOUS at 22:04

## 2021-01-15 RX ADMIN — Medication 1000 UNIT(S): at 14:54

## 2021-01-15 RX ADMIN — SODIUM CHLORIDE 50 MILLILITER(S): 9 INJECTION INTRAMUSCULAR; INTRAVENOUS; SUBCUTANEOUS at 17:40

## 2021-01-15 RX ADMIN — Medication 2: at 08:58

## 2021-01-15 RX ADMIN — Medication 400 MILLIGRAM(S): at 14:54

## 2021-01-15 RX ADMIN — HYDROXYUREA 500 MILLIGRAM(S): 500 CAPSULE ORAL at 17:41

## 2021-01-15 RX ADMIN — PIPERACILLIN AND TAZOBACTAM 25 GRAM(S): 4; .5 INJECTION, POWDER, LYOPHILIZED, FOR SOLUTION INTRAVENOUS at 17:41

## 2021-01-15 RX ADMIN — ATORVASTATIN CALCIUM 40 MILLIGRAM(S): 80 TABLET, FILM COATED ORAL at 22:03

## 2021-01-15 RX ADMIN — ONDANSETRON 4 MILLIGRAM(S): 8 TABLET, FILM COATED ORAL at 23:18

## 2021-01-15 RX ADMIN — Medication 1: at 17:41

## 2021-01-15 RX ADMIN — Medication 50 MILLIGRAM(S): at 06:26

## 2021-01-15 RX ADMIN — Medication 1 MILLIGRAM(S): at 12:22

## 2021-01-15 RX ADMIN — PIPERACILLIN AND TAZOBACTAM 25 GRAM(S): 4; .5 INJECTION, POWDER, LYOPHILIZED, FOR SOLUTION INTRAVENOUS at 06:12

## 2021-01-15 NOTE — PROGRESS NOTE ADULT - SUBJECTIVE AND OBJECTIVE BOX
Contact Information:  Dot Marie MD,  PGY-1, Internal Medicine  Pager: [phone] ([facility]) /// [phone] ([facility]ER[facility])    JOURDAN LARSON, [id number]    Patient is a 89y old  Female who presents with a chief complaint of Transferred from [facility] for SDH (2021 20:04)      OVERNIGHT EVENTS:  Admitted.  SUBJECTIVE:        OBJECTIVE:  Vital Signs Last 24 Hrs  T(C): 36.6 (15 Cosme 2021 06:25), Max: 38 (2021 10:45)  T(F): 97.9 (15 Cosme 2021 06:25), Max: 100.4 (2021 10:45)  HR: 70 (15 Cosme 2021 06:25) (60 - 102)  BP: 133/57 (15 Cosme 2021 06:25) (103/59 - 180/78)  BP(mean): --  RR: 19 (15 Cosme 2021 06:25) (16 - 20)  SpO2: 100% (15 Cosme 2021 06:25) (93% - 100%)  I&O's Summary      MEDICATIONS  (STANDING):  atorvastatin 40 milliGRAM(s) Oral at bedtime  cholecalciferol 1000 Unit(s) Oral daily  dextrose 40% Gel 15 Gram(s) Oral once  dextrose 5%. 1000 milliLiter(s) (50 mL/Hr) IV Continuous <Continuous>  dextrose 5%. 1000 milliLiter(s) (100 mL/Hr) IV Continuous <Continuous>  dextrose 50% Injectable 25 Gram(s) IV Push once  dextrose 50% Injectable 12.5 Gram(s) IV Push once  dextrose 50% Injectable 25 Gram(s) IV Push once  folic acid 1 milliGRAM(s) Oral daily  furosemide    Tablet 40 milliGRAM(s) Oral daily  glucagon  Injectable 1 milliGRAM(s) IntraMuscular once  hydroxyurea 500 milliGRAM(s) Oral daily  insulin glargine Injectable (LANTUS) 7 Unit(s) SubCutaneous at bedtime  insulin lispro (ADMELOG) corrective regimen sliding scale   SubCutaneous three times a day before meals  insulin lispro (ADMELOG) corrective regimen sliding scale   SubCutaneous at bedtime  metoprolol tartrate 50 milliGRAM(s) Oral two times a day  piperacillin/tazobactam IVPB.. 3.375 Gram(s) IV Intermittent every 12 hours    MEDICATIONS  (PRN):    Allergies    No Known Allergies    Intolerances      PHYSICAL EXAM:  GENERAL: in some mild distress, confused, but not-toxic appearing  HEAD:  Atraumatic, Normocephalic  EYES: EOMI, PERRLA, conjunctival hemorrhage right pupil inferiorly  ENMT: No tonsillar erythema, exudates, or enlargement; Moist mucous membranes, No lesions  NECK: Supple, No JVD, Normal thyroid  NERVOUS SYSTEM:  Alert & Oriented X2, Good concentration; Motor Strength 2/5 B/L upper and lower extremities;  CHEST/LUNG: right lower lung crackles, good inspiratory effort. no wheezing, rales, rhonchi  HEART: Regular rate and rhythm; No murmurs, rubs, or gallops  ABDOMEN: Soft, Nontender, Nondistended; Bowel sounds present  EXTREMITIES:  2+ Peripheral Pulses, No clubbing, cyanosis, or edema  SKIN: No rashes or lesions                        12.3   33.31 )-----------( 152      ( 15 Cosme 2021 07:51 )             45.3     PT/INR - ( 2021 16:31 )   PT: 25.5 sec;   INR: 2.33 ratio         PTT - ( 2021 16:31 )  PTT:46.5 sec      133<L>  |  95<L>  |  34<H>  ----------------------------<  395<H>  4.5   |  23  |  1.34<H>    Ca    9.3      2021 22:56  Phos  4.3       Mg     1.7         TPro  6.4  /  Alb  3.1<L>  /  TBili  1.4<H>  /  DBili  x   /  AST  26  /  ALT  23  /  AlkPhos  233<H>      CAPILLARY BLOOD GLUCOSE      POCT Blood Glucose.: 242 mg/dL (15 Cosme 2021 03:12)  POCT Blood Glucose.: 346 mg/dL (2021 23:39)  POCT Blood Glucose.: 368 mg/dL (2021 22:31)  POCT Blood Glucose.: 381 mg/dL (2021 19:45)  POCT Blood Glucose.: 367 mg/dL (2021 16:22)  POCT Blood Glucose.: 488 mg/dL (2021 10:34)  POCT Blood Glucose.: 494 mg/dL (2021 10:31)    LIVER FUNCTIONS - ( 2021 11:46 )  Alb: 3.1 g/dL / Pro: 6.4 g/dL / ALK PHOS: 233 U/L / ALT: 23 U/L DA / AST: 26 U/L / GGT: x               Urinalysis Basic - ( 2021 12:40 )    Color: Yellow / Appearance: Slightly Turbid / S.020 / pH: x  Gluc: x / Ketone: Negative  / Bili: Small / Urobili: 4 mg/dL   Blood: x / Protein: 100 mg/dL / Nitrite: Negative   Leuk Esterase: Trace / RBC: 6-10 /HPF / WBC 6-10   Sq Epi: x / Non Sq Epi: Few / Bacteria: Moderate            RADIOLOGY AND ADDITIONAL TESTS:    CONSULTANT NOTES REVIEWED:    CARE DISCUSSED WITH THE FOLLOWING CONSULTANTS/PROVIDERS: Contact Information:  Dot Marie MD,  PGY-1, Internal Medicine  Pager: [phone] ([facility]) /// [phone] ([facility])    JOURDAN LARSON, [id number]    Patient is a 89y old  Female who presents with a chief complaint of Transferred from [facility] for SDH (2021 20:04)      OVERNIGHT EVENTS:  Admitted.  SUBJECTIVE:  Pt seen and examined at bedside. She is moaning in distress, reporting back pain but unable to express location of pain. She is not answering orientation questions.       OBJECTIVE:  Vital Signs Last 24 Hrs  T(C): 36.6 (15 Cosme 2021 06:25), Max: 38 (2021 10:45)  T(F): 97.9 (15 Cosme 2021 06:25), Max: 100.4 (2021 10:45)  HR: 70 (15 Cosme 2021 06:25) (60 - 102)  BP: 133/57 (15 Cosme 2021 06:25) (103/59 - 180/78)  BP(mean): --  RR: 19 (15 Cosme 2021 06:25) (16 - 20)  SpO2: 100% (15 Cosme 2021 06:25) (93% - 100%)  I&O's Summary      MEDICATIONS  (STANDING):  atorvastatin 40 milliGRAM(s) Oral at bedtime  cholecalciferol 1000 Unit(s) Oral daily  dextrose 40% Gel 15 Gram(s) Oral once  dextrose 5%. 1000 milliLiter(s) (50 mL/Hr) IV Continuous <Continuous>  dextrose 5%. 1000 milliLiter(s) (100 mL/Hr) IV Continuous <Continuous>  dextrose 50% Injectable 25 Gram(s) IV Push once  dextrose 50% Injectable 12.5 Gram(s) IV Push once  dextrose 50% Injectable 25 Gram(s) IV Push once  folic acid 1 milliGRAM(s) Oral daily  furosemide    Tablet 40 milliGRAM(s) Oral daily  glucagon  Injectable 1 milliGRAM(s) IntraMuscular once  hydroxyurea 500 milliGRAM(s) Oral daily  insulin glargine Injectable (LANTUS) 7 Unit(s) SubCutaneous at bedtime  insulin lispro (ADMELOG) corrective regimen sliding scale   SubCutaneous three times a day before meals  insulin lispro (ADMELOG) corrective regimen sliding scale   SubCutaneous at bedtime  metoprolol tartrate 50 milliGRAM(s) Oral two times a day  piperacillin/tazobactam IVPB.. 3.375 Gram(s) IV Intermittent every 12 hours    MEDICATIONS  (PRN):    Allergies    No Known Allergies    Intolerances      PHYSICAL EXAM:  GENERAL: in some mild distress, confused, but not-toxic appearing  HEAD:  Atraumatic, Normocephalic  EYES: EOMI, PERRLA, conjunctival hemorrhage right pupil inferiorly  ENMT: No tonsillar erythema, exudates, or enlargement; Moist mucous membranes, No lesions  NECK: Supple, No JVD, Normal thyroid  NERVOUS SYSTEM:  Alert & Oriented X2, Good concentration; Motor Strength 2/5 B/L upper and lower extremities;  CHEST/LUNG: right lower lung crackles, good inspiratory effort. no wheezing, rales, rhonchi  HEART: Regular rate and rhythm; No murmurs, rubs, or gallops  ABDOMEN: Soft, Nontender, Nondistended; Bowel sounds present  EXTREMITIES:  2+ Peripheral Pulses, No clubbing, cyanosis, or edema  SKIN: No rashes or lesions                        12.3   33.31 )-----------( 152      ( 15 Cosme 2021 07:51 )             45.3     PT/INR - ( 2021 16:31 )   PT: 25.5 sec;   INR: 2.33 ratio         PTT - ( 2021 16:31 )  PTT:46.5 sec      133<L>  |  95<L>  |  34<H>  ----------------------------<  395<H>  4.5   |  23  |  1.34<H>    Ca    9.3      2021 22:56  Phos  4.3       Mg     1.7         TPro  6.4  /  Alb  3.1<L>  /  TBili  1.4<H>  /  DBili  x   /  AST  26  /  ALT  23  /  AlkPhos  233<H>      CAPILLARY BLOOD GLUCOSE      POCT Blood Glucose.: 242 mg/dL (15 Cosme 2021 03:12)  POCT Blood Glucose.: 346 mg/dL (2021 23:39)  POCT Blood Glucose.: 368 mg/dL (2021 22:31)  POCT Blood Glucose.: 381 mg/dL (2021 19:45)  POCT Blood Glucose.: 367 mg/dL (2021 16:22)  POCT Blood Glucose.: 488 mg/dL (2021 10:34)  POCT Blood Glucose.: 494 mg/dL (2021 10:31)    LIVER FUNCTIONS - ( 2021 11:46 )  Alb: 3.1 g/dL / Pro: 6.4 g/dL / ALK PHOS: 233 U/L / ALT: 23 U/L DA / AST: 26 U/L / GGT: x               Urinalysis Basic - ( 2021 12:40 )    Color: Yellow / Appearance: Slightly Turbid / S.020 / pH: x  Gluc: x / Ketone: Negative  / Bili: Small / Urobili: 4 mg/dL   Blood: x / Protein: 100 mg/dL / Nitrite: Negative   Leuk Esterase: Trace / RBC: 6-10 /HPF / WBC 6-10   Sq Epi: x / Non Sq Epi: Few / Bacteria: Moderate            RADIOLOGY AND ADDITIONAL TESTS:    CONSULTANT NOTES REVIEWED:    CARE DISCUSSED WITH THE FOLLOWING CONSULTANTS/PROVIDERS: Contact Information:  Dot Marie MD,  PGY-1, Internal Medicine  Pager: [phone] ([facility]) /// [phone] ([facility])    JOURDAN LARSON, MRN-[id number]    Patient is a 89y old  Female who presents with a chief complaint of Transferred from [facility] for SDH (2021 20:04)      OVERNIGHT EVENTS:  Admitted.  SUBJECTIVE:  Pt seen and examined at bedside. She is moaning in distress, reporting back pain but unable to express location of pain. She is not answering orientation questions.       OBJECTIVE:  Vital Signs Last 24 Hrs  T(C): 36.6 (15 Cosme 2021 06:25), Max: 38 (2021 10:45)  T(F): 97.9 (15 Cosme 2021 06:25), Max: 100.4 (2021 10:45)  HR: 70 (15 Cosme 2021 06:25) (60 - 102)  BP: 133/57 (15 Cosme 2021 06:25) (103/59 - 180/78)  BP(mean): --  RR: 19 (15 Cosme 2021 06:25) (16 - 20)  SpO2: 100% (15 Cosme 2021 06:25) (93% - 100%)  I&O's Summary      MEDICATIONS  (STANDING):  atorvastatin 40 milliGRAM(s) Oral at bedtime  cholecalciferol 1000 Unit(s) Oral daily  dextrose 40% Gel 15 Gram(s) Oral once  dextrose 5%. 1000 milliLiter(s) (50 mL/Hr) IV Continuous <Continuous>  dextrose 5%. 1000 milliLiter(s) (100 mL/Hr) IV Continuous <Continuous>  dextrose 50% Injectable 25 Gram(s) IV Push once  dextrose 50% Injectable 12.5 Gram(s) IV Push once  dextrose 50% Injectable 25 Gram(s) IV Push once  folic acid 1 milliGRAM(s) Oral daily  furosemide    Tablet 40 milliGRAM(s) Oral daily  glucagon  Injectable 1 milliGRAM(s) IntraMuscular once  hydroxyurea 500 milliGRAM(s) Oral daily  insulin glargine Injectable (LANTUS) 7 Unit(s) SubCutaneous at bedtime  insulin lispro (ADMELOG) corrective regimen sliding scale   SubCutaneous three times a day before meals  insulin lispro (ADMELOG) corrective regimen sliding scale   SubCutaneous at bedtime  metoprolol tartrate 50 milliGRAM(s) Oral two times a day  piperacillin/tazobactam IVPB.. 3.375 Gram(s) IV Intermittent every 12 hours    MEDICATIONS  (PRN):    Allergies    No Known Allergies    Intolerances      PHYSICAL EXAM:  GENERAL: in distress, confused, but not-toxic appearing  HEAD:  Atraumatic, Normocephalic  EYES: EOMI, PERRLA, conjunctival hemorrhage right pupil inferiorly  ENMT: No tonsillar erythema, exudates, or enlargement; Moist mucous membranes, No lesions  NECK: Supple, No JVD, Normal thyroid  NERVOUS SYSTEM:  awake, moaning in distress, unable to assess orientation; Motor Strength 2/5 B/L upper and lower extremities;  CHEST/LUNG: faint right lower lung crackles, good inspiratory effort. no wheezing, rales, rhonchi  HEART: Regular rate and rhythm; No murmurs, rubs, or gallops  ABDOMEN: Soft, Nontender, Nondistended; Bowel sounds present  EXTREMITIES:  2+ Peripheral Pulses, No clubbing, cyanosis, or edema  SKIN: No rashes or lesions                        12.3   33.31 )-----------( 152      ( 15 Cosme 2021 07:51 )             45.3     PT/INR - ( 2021 16:31 )   PT: 25.5 sec;   INR: 2.33 ratio         PTT - ( 2021 16:31 )  PTT:46.5 sec      133<L>  |  95<L>  |  34<H>  ----------------------------<  395<H>  4.5   |  23  |  1.34<H>    Ca    9.3      2021 22:56  Phos  4.3       Mg     1.7         TPro  6.4  /  Alb  3.1<L>  /  TBili  1.4<H>  /  DBili  x   /  AST  26  /  ALT  23  /  AlkPhos  233<H>      CAPILLARY BLOOD GLUCOSE      POCT Blood Glucose.: 242 mg/dL (15 Cosme 2021 03:12)  POCT Blood Glucose.: 346 mg/dL (2021 23:39)  POCT Blood Glucose.: 368 mg/dL (2021 22:31)  POCT Blood Glucose.: 381 mg/dL (2021 19:45)  POCT Blood Glucose.: 367 mg/dL (2021 16:22)  POCT Blood Glucose.: 488 mg/dL (2021 10:34)  POCT Blood Glucose.: 494 mg/dL (2021 10:31)    LIVER FUNCTIONS - ( 2021 11:46 )  Alb: 3.1 g/dL / Pro: 6.4 g/dL / ALK PHOS: 233 U/L / ALT: 23 U/L DA / AST: 26 U/L / GGT: x               Urinalysis Basic - ( 2021 12:40 )    Color: Yellow / Appearance: Slightly Turbid / S.020 / pH: x  Gluc: x / Ketone: Negative  / Bili: Small / Urobili: 4 mg/dL   Blood: x / Protein: 100 mg/dL / Nitrite: Negative   Leuk Esterase: Trace / RBC: 6-10 /HPF / WBC 6-10   Sq Epi: x / Non Sq Epi: Few / Bacteria: Moderate      < from: Xray Chest 1 View- PORTABLE-Routine (Xray Chest 1 View- PORTABLE-Routine .) (21 @ 11:19) >    Impression: Probable mild interstitial edema. No focal infiltrates.    < end of copied text >        RADIOLOGY AND ADDITIONAL TESTS:    CONSULTANT NOTES REVIEWED:    CARE DISCUSSED WITH THE FOLLOWING CONSULTANTS/PROVIDERS:

## 2021-01-15 NOTE — SWALLOW BEDSIDE ASSESSMENT ADULT - SWALLOW EVAL: DIAGNOSIS
1. Patient demonstrated mild oral dysphagia with honey-thick liquids, nectar-thick liquids, and thin liquids characterized by slow manipulation of bolus and reduced bolus propulsion. 2. Patient demonstrated mild-moderate oral dysphagia with puree, mechanical soft solids characterized by slow manipulation of bolus, reduced bolus propulsion and oral residue. Residue cleared with liquid wash. 3. Patient demonstrated mild pharyngeal dysphagia with honey-thick liquids and nectar-thick liquids characterized by suspected delay in pharyngeal swallow trigger and present hyolaryngeal elevation upon digital palpation. No overt signs or symptoms of aspiration/penetration noted. 4. Patient demonstrated moderate-severe pharyngeal dysphagia with thin liquids characterized by suspected delay in pharyngeal swallow trigger and present hyolaryngeal elevation upon digital palpation. Weak cough noted post intake, suggestive of airway aspiration/penetration.

## 2021-01-15 NOTE — PROGRESS NOTE ADULT - PROBLEM SELECTOR PLAN 2
INR greater than 14.99, unclear etiology but likely in the setting of decreased PO intake due to cystitis or other etiology; possible unintentional OD;  -INR not fully reversed with kcentra likely due to INR greater than 14.99 (not 15) vitamin K with improvement in INR, but not fully reversed  -will give 1u FFP tonight to achieve full INR reversal  -daily INR  - hold coumadin for > weeks, primary team to f/u final NSx recs prior to d/c home INR greater than 14.99, unclear etiology but likely in the setting of decreased PO intake due to cystitis or other etiology; possible unintentional OD;  -INR not fully reversed with kcentra likely due to INR greater than 14.99 (not 15) vitamin K with improvement in INR, but not fully reversed  -will give 1u FFP tonight to achieve full INR reversal  -daily INR  - hold coumadin for > weeks, primary team to f/u final NSx recs prior to d/c home  - INR downtrending

## 2021-01-15 NOTE — PROGRESS NOTE ADULT - ATTENDING COMMENTS
89 y.o. F w/ a hx of afib on Warfarin, myeloproliferative d/o on hydroxyurea, CAD, HTN, DM2 transferred from [facility] for NSGY evaluation for subdural hematoma in the setting of supratherapeutic INR. On Zosyn for possible UTI. Is oriented to self, knows she is in a hospital but does not know name, believes it is february, frequently falling asleep during exam.     # Subdural hematoma: INR 14.9 on admission s/p FFP and KCentra. Hold ASA and Warfarin given bleed. No reports of any recent falls. Repeat CT head STAT as patient appears more lethargic on exam. NSGY following. SLP/PT/OT c/s.   # Hypoxia: Patient requiring 4L though appears dry on exam. Wean down to RA. Noted to have mild interstitial edema but pt does not appear to be in HF exacerbation.   # Possible UTI: U/A +, Zosyn started. Collect Ucx- notably after Zosyn was started.    I have personally seen, examined and participated in the care of this patient. I have reviewed all pertinent clinical information, including history, physical exam, plan and the resident's note and agree except as noted.

## 2021-01-15 NOTE — PHYSICAL THERAPY INITIAL EVALUATION ADULT - ADDITIONAL COMMENTS
Pt lethargic, unable to provide full history. Per care coordination assessment, pt was independent until April of 2020. Prior to admission to [facility], pt was ambulating with a walker or cane and grand-daughter assisted with ADLs.    Patient was left semi-supine in bed as found, all lines/tubes intact and call bell within reach, RN aware.

## 2021-01-15 NOTE — OCCUPATIONAL THERAPY INITIAL EVALUATION ADULT - GENERAL OBSERVATIONS, REHAB EVAL
Patient received semisupine in bed in NAD. +4L oxygen via nasal cannula. Per RN Laura, patient okay to participate in OT evaluation.

## 2021-01-15 NOTE — CHART NOTE - NSCHARTNOTEFT_GEN_A_CORE
HPI: 89y Female transfer from [facility] with history multiple falls. Patient found to have subdural collections on CTH at [place] this morning. Patient transferred to [facility] for further care. She also has a UTI. Repeat CTH showing stable b/l chronic SDH from prior scans. INR has been decreasing from 2.2 to 1.5 today.     If any change in mental status obtain a repeat CTH. Our recommendation is to hold anticoagulation or any other blood thinners for 2 weeks, and get f/u CT head before restarting. Reconsult neurosurgery for any change on CT head prior to restarting A/C. Pager [phone]. Imaging and case reviewed with attending neurosurgeon.  No acute neurosurgical intervention at this time.  Care per primary team.  Please reconsult as needed.      < from: CT Head No Cont (01.15.21 @ 13:55) >    INTERPRETATION:  Clinical indications: Follow-up subdural hematoma    Multiple axial sections were performed from base of skull to vertex without contrast enhancement. Coronal and sagittalreconstructions were performed as well    Parenchymal volume loss and chronic microvascular ischemic changes are identified    Chronic appearing subdural hematomas again seen bilaterally. The subdural on the right side measures approximately 1.2 cm widest diameter and previously measured approximately 1.1 cm widest diameter. The subdural hematoma on the left side measures approximately 0.7 cm in widest diameter and previously measured approximately 0.9 cm widest diameter.    No significant shift or herniation is seen.    Evaluation of the osseous structures with the appropriate window appears normal    The visualized paranasal sinuses mastoid and mastoids appear clear.    IMPRESSION: Chronic subdural hematomas are again seen bilaterally as described above.

## 2021-01-15 NOTE — SWALLOW BEDSIDE ASSESSMENT ADULT - COMMENTS
H&P 1/14/2021: 89yo F, with Afib on coumadin, Myeloproliferative disorder (on hydroxyurea), HTN, HLD, CAD s/p prior MI (9 years ago) as per prior documentation: known chronically occluded RCA, s/p PCI to prox LAD, normal LV, Type 2 DM; Pt transferred from [facility] for Nsx evaluation given CT head (+) SDH; a/w SDH in the context of supra-therapeutic INR (greater 14.99), hyperglycemia, and gen weakness; Found to have UTI in the setting of immunosuppression and immuno compromised status;     CT Head 1/14/2021: Bilateral convexity hypodense subdural collections, measuring 8 mm on the right and 5 mm on the left. Right-to-left midline shift measuring 3 mm.     Clinical bedside swallow evaluation completed during prior hospitalization on 7/11/2019 with recommendations of regular solids with thin liquids. See note for details.    Consult received and chart reviewed. Patient seen during breakfast for clinical swallow evaluation. Patient agitated and receiving oxygen via nasal cannula at 4L/min. Patient alert and oriented to self only; maximum encouragement required for patient to participate in clinical swallow evaluation. Patient able to make wants/needs known but agitated and unwilling to follow 1-step directions.     Results and recommendations discussed with patient and RN on unit. Called out to MD.

## 2021-01-15 NOTE — OCCUPATIONAL THERAPY INITIAL EVALUATION ADULT - PERTINENT HX OF CURRENT PROBLEM, REHAB EVAL
89 year old female with history of Afib on coumadin, Myeloproliferative disorder (on hydroxyurea), HTN, HLD, CAD s/p prior MI (9 years ago) presenting as a transfer from [facility] for SDH. Admitted with SDH in the context of supra-therapeutic INR (greater 14.99), hyperglycemia, and generalized weakness; Found to have UTI in the setting of immunosuppression and immuno compromised status.

## 2021-01-15 NOTE — PROGRESS NOTE ADULT - PROBLEM SELECTOR PLAN 10
Diet: dysphagia I, patient seemed to have been coughing while having food today, will get speech and swallow  DVT PPx: SCDs  Dispo: pending workup, PT consult, OT consult, [facility]: spoke with son and HCP, Chuy, would like mother to be full code at this time    #Elevated billirubin: likely from hydroxyurea, likely has baseline hemolysis Diet: dysphagia 2 per speech and swallow evaluation  DVT PPx: SCDs  Dispo: pending workup, PT consult, OT consult,   GOC: spoke with son and HCP, Chuy, would like mother to be full code at this time    #Elevated billirubin: likely from hydroxyurea, likely has baseline hemolysis

## 2021-01-15 NOTE — OCCUPATIONAL THERAPY INITIAL EVALUATION ADULT - LEVEL OF INDEPENDENCE: SIT/STAND, REHAB EVAL
Health Maintenance Due   Topic Date Due   • Hepatitis C Screening  02/19/2000   • Shingles Vaccine (2 of 3) 06/07/2013   • Medicare Wellness Visit  04/27/2018       Patient is due for topics as listed above but is not proceeding with Immunization(s) Shingles at this time. Orders placed for Hepatitis C Screening.           minimum assist (75% patients effort)

## 2021-01-15 NOTE — SWALLOW BEDSIDE ASSESSMENT ADULT - SWALLOW EVAL: RECOMMENDED DIET
Dysphagia 2 Diet (mechanical soft solids) with nectar-thick liquids with appropriate use of safe feeding strategies. Safe feeding strategies include: 1. Sitting upright at 90 degrees during PO intake. 2. Small sips/bites. 3. Alternate solids/liquids.

## 2021-01-15 NOTE — OCCUPATIONAL THERAPY INITIAL EVALUATION ADULT - ADDITIONAL COMMENTS
Patient unable to provide complete social history due to lethargy. Patient reports living with her granddaughter. Per care coordination assessment, patient lives with her granddaughter in a private home. Patient needs assistance for some ADLs (dressing) and her granddaughter assists with IADLs. Patient uses a rolling walker for ambulation.

## 2021-01-15 NOTE — PHYSICAL THERAPY INITIAL EVALUATION ADULT - PERTINENT HX OF CURRENT PROBLEM, REHAB EVAL
Pt is a 89 year old female presenting as a transfer from [facility] for SDH. Admitted with SDH in the context of supra-therapeutic INR (greater 14.99), hyperglycemia, and generalized weakness; Found to have UTI in the setting of immunosuppression and immuno compromised status. PMH: Afib on coumadin, Myeloproliferative disorder (on hydroxyurea), HTN, HLD, CAD s/p prior MI (9 years ago).

## 2021-01-15 NOTE — PROGRESS NOTE ADULT - PROBLEM SELECTOR PLAN 6
WBC 35K, due to myeloproliferative disorder, on hydroxyurea;   -will treat UTI empirically   -f/u bcx  -f/u ucx WBC 35K, due to myeloproliferative disorder, on hydroxyurea;   -will treat UTI empirically   -f/u bcx and urine cultures  -f/u ucx

## 2021-01-15 NOTE — PROGRESS NOTE ADULT - PROBLEM SELECTOR PLAN 1
Pt presented initially with INR greater than 14.99; Found to have subdural hematoma (8mm on R, and 5mm on L with 3mm midline shift) which is stable on repeat CT  -s/p vit K, kCentra in OSH  ([facility]) with INR >15, improving to 2.33  -1u FFP now pending to reverse INR  - neurosurgery recs appreciated:  n/o AC for >2 weeks  - holding ASA Pt presented initially with INR greater than 14.99; Found to have subdural hematoma (8mm on R, and 5mm on L with 3mm midline shift) which is stable on repeat CT  -s/p vit K, kCentra in OSH  ([facility]) with INR >15, improving to 2.33  -1u FFP now pending to reverse INR  - neurosurgery recs appreciated:  n/o AC for >2 weeks  - holding ASA  - f/u repeat CT head due to new lethargy Pt presented initially with INR greater than 14.99; Found to have subdural hematoma (8mm on R, and 5mm on L with 3mm midline shift) which is stable on repeat CT  -s/p vit K, kCentra in OSH  ([facility]) with INR >15, improving to 2.33  -1u FFP now pending to reverse INR  - neurosurgery recs appreciated:  n/o AC for >2 weeks  - holding ASA  - f/u repeat CT head due to new lethargy    #DRU  - Cr elevated to 1.50  - most likely pre-renal given hx of decreased PO intake; however will avoid fluids for c/f volume overload in setting of mild interstitial edema on CXR  - c/t monitor

## 2021-01-15 NOTE — PHYSICAL THERAPY INITIAL EVALUATION ADULT - PATIENT PROFILE REVIEW, REHAB EVAL
PT orders received: No formal activity order. Consult with RN Laura ALVES, patient may participate in PT evaluation./yes

## 2021-01-15 NOTE — PROGRESS NOTE ADULT - ASSESSMENT
89yo F, with Afib on coumadin, Myeloproliferative disorder (on hydroxyurea), HTN, HLD, CAD s/p prior MI (9 years ago) as per prior documentation: known chronically occluded RCA, s/p PCI to prox LAD, normal LV, Type 2 DM; Pt transferred from [facility] for Nsx evaluation given CT head (+) SDH; a/w SDH in the context of supra-therapeutic INR (greater 14.99), hyperglycemia, and gen weakness; Found to have UTI in the setting of immunosuppression and immuno compromised status;  89yo F, with Afib on coumadin, Myeloproliferative disorder (on hydroxyurea), HTN, HLD, CAD s/p prior MI (9 years ago) as per prior documentation: known chronically occluded RCA, s/p PCI to prox LAD, normal LV, Type 2 DM; Pt transferred from [facility] for Nsx evaluation given CT head (+) SDH; a/w SDH in the context of supra-therapeutic INR (greater 14.99), hyperglycemia, and gen weakness; Found to have UTI in the setting of immunosuppression and immuno compromised status.

## 2021-01-16 VITALS
TEMPERATURE: 97 F | SYSTOLIC BLOOD PRESSURE: 148 MMHG | HEART RATE: 73 BPM | DIASTOLIC BLOOD PRESSURE: 74 MMHG | RESPIRATION RATE: 24 BRPM | OXYGEN SATURATION: 98 %

## 2021-01-16 LAB
A1C WITH ESTIMATED AVERAGE GLUCOSE RESULT: 10.8 % — HIGH (ref 4–5.6)
ALBUMIN SERPL ELPH-MCNC: 3.4 G/DL — SIGNIFICANT CHANGE UP (ref 3.3–5)
ALP SERPL-CCNC: 374 U/L — HIGH (ref 40–120)
ALT FLD-CCNC: 28 U/L — SIGNIFICANT CHANGE UP (ref 4–33)
ANION GAP SERPL CALC-SCNC: >37 MMOL/L — HIGH (ref 7–14)
APTT BLD: 44.1 SEC — HIGH (ref 27–36.3)
AST SERPL-CCNC: 71 U/L — HIGH (ref 4–32)
BASOPHILS # BLD AUTO: 0 K/UL — SIGNIFICANT CHANGE UP (ref 0–0.2)
BASOPHILS NFR BLD AUTO: 0 % — SIGNIFICANT CHANGE UP (ref 0–2)
BILIRUB SERPL-MCNC: 1.6 MG/DL — HIGH (ref 0.2–1.2)
BUN SERPL-MCNC: 45 MG/DL — HIGH (ref 7–23)
CALCIUM SERPL-MCNC: 9.8 MG/DL — SIGNIFICANT CHANGE UP (ref 8.4–10.5)
CHLORIDE SERPL-SCNC: 96 MMOL/L — LOW (ref 98–107)
CO2 SERPL-SCNC: <7 MMOL/L — CRITICAL LOW (ref 22–31)
CREAT SERPL-MCNC: 1.84 MG/DL — HIGH (ref 0.5–1.3)
EOSINOPHIL # BLD AUTO: 0 K/UL — SIGNIFICANT CHANGE UP (ref 0–0.5)
EOSINOPHIL NFR BLD AUTO: 0 % — SIGNIFICANT CHANGE UP (ref 0–6)
ESTIMATED AVERAGE GLUCOSE: 263 MG/DL — HIGH (ref 68–114)
GLUCOSE BLDC GLUCOMTR-MCNC: 68 MG/DL — LOW (ref 70–99)
GLUCOSE BLDC GLUCOMTR-MCNC: 74 MG/DL — SIGNIFICANT CHANGE UP (ref 70–99)
GLUCOSE SERPL-MCNC: 108 MG/DL — HIGH (ref 70–99)
HCT VFR BLD CALC: 55.1 % — HIGH (ref 34.5–45)
HGB BLD-MCNC: 14.1 G/DL — SIGNIFICANT CHANGE UP (ref 11.5–15.5)
IANC: 41.32 K/UL — HIGH (ref 1.5–8.5)
LYMPHOCYTES # BLD AUTO: 3.81 K/UL — HIGH (ref 1–3.3)
LYMPHOCYTES # BLD AUTO: 6.7 % — LOW (ref 13–44)
MAGNESIUM SERPL-MCNC: 2.1 MG/DL — SIGNIFICANT CHANGE UP (ref 1.6–2.6)
MCHC RBC-ENTMCNC: 21.6 PG — LOW (ref 27–34)
MCHC RBC-ENTMCNC: 25.6 GM/DL — LOW (ref 32–36)
MCV RBC AUTO: 84.3 FL — SIGNIFICANT CHANGE UP (ref 80–100)
MONOCYTES # BLD AUTO: 0 K/UL — SIGNIFICANT CHANGE UP (ref 0–0.9)
MONOCYTES NFR BLD AUTO: 0 % — LOW (ref 2–14)
NEUTROPHILS # BLD AUTO: 47.34 K/UL — HIGH (ref 1.8–7.4)
NEUTROPHILS NFR BLD AUTO: 82.4 % — HIGH (ref 43–77)
PHOSPHATE SERPL-MCNC: 7.8 MG/DL — HIGH (ref 2.5–4.5)
PLATELET # BLD AUTO: 196 K/UL — SIGNIFICANT CHANGE UP (ref 150–400)
POTASSIUM SERPL-MCNC: 4.6 MMOL/L — SIGNIFICANT CHANGE UP (ref 3.5–5.3)
POTASSIUM SERPL-SCNC: 4.6 MMOL/L — SIGNIFICANT CHANGE UP (ref 3.5–5.3)
PROT SERPL-MCNC: 6.2 G/DL — SIGNIFICANT CHANGE UP (ref 6–8.3)
RBC # BLD: 6.54 M/UL — HIGH (ref 3.8–5.2)
RBC # FLD: 25.3 % — HIGH (ref 10.3–14.5)
SODIUM SERPL-SCNC: 140 MMOL/L — SIGNIFICANT CHANGE UP (ref 135–145)
T4 AB SER-ACNC: 7.14 UG/DL — SIGNIFICANT CHANGE UP (ref 5.1–13)
WBC # BLD: 56.9 K/UL — CRITICAL HIGH (ref 3.8–10.5)
WBC # FLD AUTO: 56.9 K/UL — CRITICAL HIGH (ref 3.8–10.5)

## 2021-01-16 RX ORDER — OLANZAPINE 15 MG/1
1.5 TABLET, FILM COATED ORAL ONCE
Refills: 0 | Status: COMPLETED | OUTPATIENT
Start: 2021-01-16 | End: 2021-01-16

## 2021-01-16 RX ORDER — ACETAMINOPHEN 500 MG
650 TABLET ORAL EVERY 6 HOURS
Refills: 0 | Status: DISCONTINUED | OUTPATIENT
Start: 2021-01-16 | End: 2021-01-16

## 2021-01-16 RX ORDER — LIDOCAINE 4 G/100G
1 CREAM TOPICAL DAILY
Refills: 0 | Status: DISCONTINUED | OUTPATIENT
Start: 2021-01-16 | End: 2021-01-16

## 2021-01-16 RX ADMIN — Medication 650 MILLIGRAM(S): at 06:11

## 2021-01-16 RX ADMIN — PIPERACILLIN AND TAZOBACTAM 25 GRAM(S): 4; .5 INJECTION, POWDER, LYOPHILIZED, FOR SOLUTION INTRAVENOUS at 06:07

## 2021-01-16 RX ADMIN — OLANZAPINE 1.5 MILLIGRAM(S): 15 TABLET, FILM COATED ORAL at 06:10

## 2021-01-16 RX ADMIN — Medication 40 MILLIGRAM(S): at 06:07

## 2021-01-16 RX ADMIN — OLANZAPINE 1.5 MILLIGRAM(S): 15 TABLET, FILM COATED ORAL at 01:03

## 2021-01-16 RX ADMIN — Medication 50 MILLIGRAM(S): at 06:07

## 2021-01-16 NOTE — PROGRESS NOTE ADULT - PROBLEM SELECTOR PLAN 2
INR greater than 14.99, unclear etiology but likely in the setting of decreased PO intake due to cystitis or other etiology; possible unintentional OD;  -INR not fully reversed with kcentra likely due to INR greater than 14.99 (not 15) vitamin K with improvement in INR, but not fully reversed  -will give 1u FFP tonight to achieve full INR reversal  -daily INR  - hold coumadin for > weeks, primary team to f/u final NSx recs prior to d/c home  - INR downtrending

## 2021-01-16 NOTE — PROGRESS NOTE ADULT - PROBLEM SELECTOR PLAN 7
Pt on coumadin at home for AC, and metoprolol tartrate 50 BID for rate control  -holding coumadin for 2 weeks, as per neurosurgery  -c/w metoprolol 50 BID
Pt on coumadin at home for AC, and metoprolol tartrate 50 BID for rate control  -holding coumadin for 2 weeks, as per neurosurgery  -c/w metoprolol 50 BID

## 2021-01-16 NOTE — PROGRESS NOTE ADULT - PROBLEM SELECTOR PLAN 5
Pt found to have UA with + leuk esterase, negative nitrites, WBC 10, and CBC showed WBC of 35 and confused state with falls, overall concerning for cystitis  -will treat initially empirically with Zosyn given that critically ill,  in the setting of immunosuppression and immuno compromised status (on hydroxyurea);  -Ucx and Bcx NGTD
Pt found to have UA with + leuk esterase, negative nitrites, WBC 10, and CBC showed WBC of 35 and confused state with falls, overall concerning for cystitis  -will treat initially empirically with Zosyn given that critically ill,  in the setting of immunosuppression and immuno compromised status (on hydroxyurea);  -f/u UCx and de-escalate as needed  -f/u BCx

## 2021-01-16 NOTE — PROGRESS NOTE ADULT - PROBLEM SELECTOR PLAN 1
Pt presented initially with INR greater than 14.99; Found to have subdural hematoma (8mm on R, and 5mm on L with 3mm midline shift) which is stable on repeat CT  -s/p vit K, kCentra in OSH  ([facility]) with INR >15, improving to 2.33  -1u FFP now pending to reverse INR  - neurosurgery recs appreciated:  n/o AC for >2 weeks  - holding ASA  - f/u repeat CT head due to new lethargy    #DRU  - Cr elevated to 1.50  - most likely pre-renal given hx of decreased PO intake; however will avoid fluids for c/f volume overload in setting of mild interstitial edema on CXR  - c/t monitor

## 2021-01-16 NOTE — PROVIDER CONTACT NOTE (OTHER) - SITUATION
bp 124/58
patient very restless. tachypneic. MUNOZ, anxious. patients fingertips and toes noted to be darker in color but blanchable. different from beginning of shift.
bp 124/58

## 2021-01-16 NOTE — PROVIDER CONTACT NOTE (OTHER) - ACTION/TREATMENT ORDERED:
Dr. Warren came to bedside to evaluate patient. zyprexa was ordered for agitation and tylenol for pain. continue to monitor.
MD aware. Will cont to monitor patient.
MD aware. Will cont to monitor patient.

## 2021-01-16 NOTE — PROVIDER CONTACT NOTE (CRITICAL VALUE NOTIFICATION) - ASSESSMENT
WBC 47.44, patient assessed and has tremors, patient states, " I am feeling so cold." No other signs of acute distress noted.
pt coded and pronounced  at 950.

## 2021-01-16 NOTE — PROGRESS NOTE ADULT - PROBLEM SELECTOR PLAN 9
Hx of CAD w/ significant stenosis in the proximal LAD, and is s/p PCI with Synergy stents in July of 2019.  -holding aspirin 81 in the setting of bleed  -restarting/holding ASA to be discussed with NSx and Cardiology respectively(primary team)
Hx of CAD w/ significant stenosis in the proximal LAD, and is s/p PCI with Synergy stents in July of 2019.  -holding aspirin 81 in the setting of bleed  -restarting/holding ASA to be discussed with NSx and Cardiology respectively(primary team)

## 2021-01-16 NOTE — PROGRESS NOTE ADULT - PROBLEM SELECTOR PLAN 10
Diet: dysphagia 2 per speech and swallow evaluation  DVT PPx: SCDs  Dispo: pending workup, PT consult, OT consult,   GOC: spoke with son and HCP, Chuy, would like mother to be full code at this time    #Elevated billirubin: likely from hydroxyurea, likely has baseline hemolysis

## 2021-01-16 NOTE — PROGRESS NOTE ADULT - PROBLEM SELECTOR PLAN 6
WBC 35K, due to myeloproliferative disorder, on hydroxyurea;   -will treat UTI empirically   -Ucx and Bcx NGTD

## 2021-01-16 NOTE — CHART NOTE - NSCHARTNOTEFT_GEN_A_CORE
Code blue called for unresponsive, pulseless patient. Patient found to be in PEA arrest. CPR initiated and epi given x2. Patient full code at initiation of code blue, family contacted during event and they decided against further resuscitation. Code ended. Patient pronounced at 9:50 AM

## 2021-01-16 NOTE — CHART NOTE - NSCHARTNOTEFT_GEN_A_CORE
Called to evaluate the patient for no pulse.    On physical exam, patient did not respond to verbal or physical stimuli. No spontaneous respirations.  Absent heart and breath sounds. Absent radial, femoral, and carotid pulses.  Pupils are fixed and dilated, absent PERRLA, no corneal reflex.  EKG rhythm strip shows aystole.   Patient pronounced dead at 9:50 am on 1/16/2021 Attending,  ______ notified regarding patient's status.  Family (son) notified. Autopsy declined.    Dot Marie M.D. PGY-1 Called to evaluate the patient for no pulse.    On physical exam, patient did not respond to verbal or physical stimuli. No spontaneous respirations.  Absent heart and breath sounds. Absent radial, femoral, and carotid pulses.  Pupils are fixed and dilated, absent PERRLA, no corneal reflex.  EKG rhythm strip shows aystole.   Patient pronounced dead at 9:50 am on 1/16/2021 Attending, Dr. Oneill notified regarding patient's status.  Family (son) notified. Autopsy declined.    Dot Marie M.D. PGY-1

## 2021-01-16 NOTE — PROVIDER CONTACT NOTE (OTHER) - BACKGROUND
patient admitted for subdural hemorrhage. pmh afib, dm2

## 2021-01-16 NOTE — PROVIDER CONTACT NOTE (OTHER) - REASON
patient very restless. tachypneic. MUNOZ, anxious. patients fingertips and toes noted to be darker in color but blanchable. different from beginning of shift.
bp 124/58
bp 130/58

## 2021-01-16 NOTE — PROGRESS NOTE ADULT - SUBJECTIVE AND OBJECTIVE BOX
Contact Information:  Dot Marie MD,  PGY-1, Internal Medicine  Pager: [phone] ([facility]) /// [phone] (JOSE DAVID)    JOURDAN LARSON, [id number]    Patient is a 89y old  Female who presents with a chief complaint of Transferred from [facility] for SDH (15 Cosme 2021 08:17)      OVERNIGHT EVENTS:  Pt complaining of back pain, for which she received tylenol. Per night float, patient was agitated and received 1.5mg IM zyprexa x2, along with zofran for nausea.  SUBJECTIVE:        OBJECTIVE:  Vital Signs Last 24 Hrs  T(C): 36.3 (2021 06:05), Max: 36.8 (15 Cosme 2021 16:30)  T(F): 97.4 (2021 06:05), Max: 98.3 (15 Cosme 2021 16:30)  HR: 73 (2021 06:05) (56 - 76)  BP: 148/74 (2021 06:05) (116/60 - 148/74)  BP(mean): --  RR: 24 (2021 06:05) (17 - 24)  SpO2: 98% (2021 06:05) (96% - 100%)  I&O's Summary    15 Cosme 2021 07:01  -  2021 07:00  --------------------------------------------------------  IN: 0 mL / OUT: 400 mL / NET: -400 mL        MEDICATIONS  (STANDING):  atorvastatin 40 milliGRAM(s) Oral at bedtime  cholecalciferol 1000 Unit(s) Oral daily  dextrose 40% Gel 15 Gram(s) Oral once  dextrose 5%. 1000 milliLiter(s) (50 mL/Hr) IV Continuous <Continuous>  dextrose 5%. 1000 milliLiter(s) (100 mL/Hr) IV Continuous <Continuous>  dextrose 50% Injectable 25 Gram(s) IV Push once  dextrose 50% Injectable 12.5 Gram(s) IV Push once  dextrose 50% Injectable 25 Gram(s) IV Push once  folic acid 1 milliGRAM(s) Oral daily  furosemide    Tablet 40 milliGRAM(s) Oral daily  glucagon  Injectable 1 milliGRAM(s) IntraMuscular once  hydroxyurea 500 milliGRAM(s) Oral daily  insulin glargine Injectable (LANTUS) 10 Unit(s) SubCutaneous at bedtime  insulin lispro (ADMELOG) corrective regimen sliding scale   SubCutaneous three times a day before meals  insulin lispro (ADMELOG) corrective regimen sliding scale   SubCutaneous at bedtime  lidocaine   Patch 1 Patch Transdermal daily  metoprolol tartrate 50 milliGRAM(s) Oral two times a day  piperacillin/tazobactam IVPB.. 3.375 Gram(s) IV Intermittent every 12 hours  sodium chloride 0.9%. 500 milliLiter(s) (50 mL/Hr) IV Continuous <Continuous>    MEDICATIONS  (PRN):  acetaminophen   Tablet .. 650 milliGRAM(s) Oral every 6 hours PRN Mild Pain (1 - 3)    Allergies    No Known Allergies    Intolerances        PHYSICAL EXAM:  GENERAL: in distress, confused, but not-toxic appearing  HEAD:  Atraumatic, Normocephalic  EYES: EOMI, PERRLA, conjunctival hemorrhage right pupil inferiorly  ENMT: No tonsillar erythema, exudates, or enlargement; Moist mucous membranes, No lesions  NECK: Supple, No JVD, Normal thyroid  NERVOUS SYSTEM:  awake, moaning in distress, unable to assess orientation; Motor Strength 2/5 B/L upper and lower extremities;  CHEST/LUNG: faint right lower lung crackles, good inspiratory effort. no wheezing, rales, rhonchi  HEART: Regular rate and rhythm; No murmurs, rubs, or gallops  ABDOMEN: Soft, Nontender, Nondistended; Bowel sounds present  EXTREMITIES:  2+ Peripheral Pulses, No clubbing, cyanosis, or edema  SKIN: No rashes or lesions                        12.3   33.31 )-----------( 152      ( 15 Cosme 2021 07:51 )             45.3     PT/INR - ( 15 Cosme 2021 07:51 )   PT: 16.9 sec;   INR: 1.50 ratio         PTT - ( 2021 16:31 )  PTT:46.5 sec  01-15    137  |  95<L>  |  38<H>  ----------------------------<  214<H>  4.3   |  24  |  1.50<H>    Ca    9.6      15 Cosme 2021 07:51  Phos  4.4     -15  Mg     1.8     -15    TPro  6.1  /  Alb  3.4  /  TBili  1.4<H>  /  DBili  x   /  AST  44<H>  /  ALT  20  /  AlkPhos  211<H>  -15    CAPILLARY BLOOD GLUCOSE      POCT Blood Glucose.: 136 mg/dL (15 Cosme 2021 22:01)  POCT Blood Glucose.: 158 mg/dL (15 Cosme 2021 17:34)  POCT Blood Glucose.: 219 mg/dL (15 Cosme 2021 12:16)  POCT Blood Glucose.: 231 mg/dL (15 Cosme 2021 08:32)    LIVER FUNCTIONS - ( 15 Cosme 2021 07:51 )  Alb: 3.4 g/dL / Pro: 6.1 g/dL / ALK PHOS: 211 U/L / ALT: 20 U/L / AST: 44 U/L / GGT: x               Urinalysis Basic - ( 2021 12:40 )    Color: Yellow / Appearance: Slightly Turbid / S.020 / pH: x  Gluc: x / Ketone: Negative  / Bili: Small / Urobili: 4 mg/dL   Blood: x / Protein: 100 mg/dL / Nitrite: Negative   Leuk Esterase: Trace / RBC: 6-10 /HPF / WBC 6-10   Sq Epi: x / Non Sq Epi: Few / Bacteria: Moderate        Culture - Blood (collected 2021 16:38)  Source: .Blood Blood  Preliminary Report (15 Cosme 2021 17:02):    No growth to date.    Culture - Blood (collected 2021 16:38)  Source: .Blood Blood  Preliminary Report (15 Cosme 2021 17:02):    No growth to date.    Culture - Urine (collected 2021 16:36)  Source: .Urine Catheterized  Final Report (15 Cosme 2021 16:39):    No growth          RADIOLOGY AND ADDITIONAL TESTS:    CONSULTANT NOTES REVIEWED:    CARE DISCUSSED WITH THE FOLLOWING CONSULTANTS/PROVIDERS:

## 2021-01-16 NOTE — PROVIDER CONTACT NOTE (OTHER) - ASSESSMENT
Mallampati: Class III - soft palate, base of uvula visible. ASA: Class 2 - patient with mild systemic disease.
patient lethargic resting in the bed at this time. no complaints.
patient very restless. tachypneic. MUNOZ, anxious   patients fingertips and toes noted to be darker in color but blanchable. different from beginning of shift.
patient lethargic resting in the bed at this time. no complaints.

## 2021-01-16 NOTE — PROGRESS NOTE ADULT - ASSESSMENT
89yo F, with Afib on coumadin, Myeloproliferative disorder (on hydroxyurea), HTN, HLD, CAD s/p prior MI (9 years ago) as per prior documentation: known chronically occluded RCA, s/p PCI to prox LAD, normal LV, Type 2 DM; Pt transferred from [facility] for Nsx evaluation given CT head (+) SDH; a/w SDH in the context of supra-therapeutic INR (greater 14.99), hyperglycemia, and gen weakness; Found to have UTI in the setting of immunosuppression and immuno compromised status.

## 2021-01-16 NOTE — DISCHARGE NOTE FOR THE EXPIRED PATIENT - HOSPITAL COURSE
89yo F, with Afib on coumadin, Myeloproliferative disorder (on hydroxyurea), HTN, HLD, CAD s/p prior MI (9 years ago) as per prior documentation: known chronically occluded RCA, s/p PCI to prox LAD, normal LV, Type 2 DM; Pt transferred from [facility] for Nsx evaluation given CT head (+) SDH; a/w SDH in the context of supra-therapeutic INR (greater 14.99), hyperglycemia, and gen weakness; found to have UTI in the setting of immunosuppression and immuno compromised status. Patient had      Problem/Plan - 1:  ·  Problem: Subdural hemorrhage.  Plan: Pt presented initially with INR greater than 14.99; Found to have subdural hematoma (8mm on R, and 5mm on L with 3mm midline shift) which is stable on repeat CT  -s/p vit K, kCentra in OSH  ([facility]) with INR >15, improving to 2.33  -1u FFP now pending to reverse INR  - neurosurgery recs appreciated:  n/o AC for >2 weeks  - holding ASA  - f/u repeat CT head due to new lethargy    #DRU  - Cr elevated to 1.50  - most likely pre-renal given hx of decreased PO intake; however will avoid fluids for c/f volume overload in setting of mild interstitial edema on CXR  - c/t monitor.      Problem/Plan - 2:  ·  Problem: Supratherapeutic INR.  Plan: INR greater than 14.99, unclear etiology but likely in the setting of decreased PO intake due to cystitis or other etiology; possible unintentional OD;  -INR not fully reversed with kcentra likely due to INR greater than 14.99 (not 15) vitamin K with improvement in INR, but not fully reversed  -will give 1u FFP tonight to achieve full INR reversal  -daily INR  - hold coumadin for > weeks, primary team to f/u final NSx recs prior to d/c home  - INR downtrending.      Problem/Plan - 3:  ·  Problem: Type 2 diabetes mellitus with hyperglycemia, without long-term current use of insulin.  Plan: hyperglycemic to 300s-400s. Last A1c 7.6  -Weight based insulin: Lantus 7 now + ISS  -CC diet.      Problem/Plan - 4:  ·  Problem: Acute on chronic diastolic heart failure.  Plan: mild acute on chronic dHF, echo recent, no need  to repeat  Hx of Heart failure with preserved EF, last TTE 10/2020 normal LV function w/ EF60%, mild MS, mild chordal JUSTUS, mild AS, and a prominent septal bulge  -somewhat overtly overloaded, w/ crackles at right lung bases  - vascular congestion on CXR  -c/w Lasix 40mg daily  -c/w metoprolol tartrate 50 BID  -of note, pt has  Medtronic Micra PPM placed in April 2020.  - low suspicion of ACS or CHF exacerbation; pt appeared dry on exam despite mild interstitial edema on CXR. S/p gentle hydration  - no need for Tele at this time.      Problem/Plan - 5:  ·  Problem: Cystitis.  Plan: Pt found to have UA with + leuk esterase, negative nitrites, WBC 10, and CBC showed WBC of 35 and confused state with falls, overall concerning for cystitis  -will treat initially empirically with Zosyn given that critically ill,  in the setting of immunosuppression and immuno compromised status (on hydroxyurea);  -Ucx and Bcx NGTD.      Problem/Plan - 6:  Problem: Leukocytosis. Plan: WBC 35K, due to myeloproliferative disorder, on hydroxyurea;   -will treat UTI empirically   -Ucx and Bcx NGTD.     Problem/Plan - 7:  ·  Problem: Atrial fibrillation.  Plan: Pt on coumadin at home for AC, and metoprolol tartrate 50 BID for rate control  -holding coumadin for 2 weeks, as per neurosurgery  -c/w metoprolol 50 BID.      Problem/Plan - 8:  ·  Problem: Myeloproliferative disease.  Plan: c/w hydroxyurea and folic acid.      Problem/Plan - 9:  ·  Problem: Coronary artery disease.  Plan: Hx of CAD w/ significant stenosis in the proximal LAD, and is s/p PCI with Synergy stents in July of 2019.  -holding aspirin 81 in the setting of bleed  -restarting/holding ASA to be discussed with NSx and Cardiology respectively(primary team).  89yo F, with Afib on coumadin, Myeloproliferative disorder (on hydroxyurea), HTN, HLD, CAD s/p prior MI (9 years ago) as per prior documentation: known chronically occluded RCA, s/p PCI to prox LAD, normal LV, Type 2 DM; Pt transferred from [facility] for Nsx evaluation given CT head (+) SDH; a/w SDH in the context of supra-therapeutic INR (greater 14.99), hyperglycemia, and gen weakness; found to have UTI in the setting of immunosuppression and immuno compromised status. Found to have acute kidney injury of pre-renal etiology most likely in setting of decreased PO intake. Urine and blood cultures NGTD. Patient's INR downtrended to 1.50. Seen by neurosurgery, who recommended holding anticoagulation; no need for surgical intervention. Code blue called in which CPR was started. Family called, who decided against continuing CPR or other further intervention

## 2021-01-16 NOTE — PROGRESS NOTE ADULT - PROBLEM SELECTOR PLAN 4
mild acute on chronic dHF, echo recent, no need  to repeat  Hx of Heart failure with preserved EF, last TTE 10/2020 normal LV function w/ EF60%, mild MS, mild chordal JUSTUS, mild AS, and a prominent septal bulge  -somewhat overtly overloaded, w/ crackles at right lung bases  - vascular congestion on CXR  -c/w Lasix 40mg daily  -c/w metoprolol tartrate 50 BID  -of note, pt has  Medtronic Micra PPM placed in April 2020.  - low suspicion of ACS  -no need for Tele at this time
mild acute on chronic dHF, echo recent, no need  to repeat  Hx of Heart failure with preserved EF, last TTE 10/2020 normal LV function w/ EF60%, mild MS, mild chordal JUSTUS, mild AS, and a prominent septal bulge  -somewhat overtly overloaded, w/ crackles at right lung bases  - vascular congestion on CXR  -c/w Lasix 40mg daily  -c/w metoprolol tartrate 50 BID  -of note, pt has  Medtronic Micra PPM placed in April 2020.  - low suspicion of ACS or CHF exacerbation; pt appeared dry on exam despite mild interstitial edema on CXR. S/p gentle hydration  - no need for Tele at this time

## 2021-01-16 NOTE — PROGRESS NOTE ADULT - PROBLEM SELECTOR PLAN 3
hyperglycemic to 300s-400s. Last A1c 7.6  -Weight based insulin: Lantus 7 now + ISS  -CC diet
hyperglycemic to 300s-400s. Last A1c 7.6  -Weight based insulin: Lantus 7 now + ISS  -CC diet

## 2021-01-18 ENCOUNTER — RX RENEWAL (OUTPATIENT)
Age: 86
End: 2021-01-18

## 2021-01-19 LAB
CULTURE RESULTS: SIGNIFICANT CHANGE UP
CULTURE RESULTS: SIGNIFICANT CHANGE UP
SPECIMEN SOURCE: SIGNIFICANT CHANGE UP
SPECIMEN SOURCE: SIGNIFICANT CHANGE UP

## 2021-01-19 RX ORDER — METOPROLOL SUCCINATE 50 MG/1
50 TABLET, EXTENDED RELEASE ORAL TWICE DAILY
Qty: 60 | Refills: 2 | Status: ACTIVE | COMMUNITY
Start: 2020-06-09

## 2021-03-15 ENCOUNTER — APPOINTMENT (OUTPATIENT)
Dept: CARDIOLOGY | Facility: CLINIC | Age: 86
End: 2021-03-15

## 2021-09-09 NOTE — PHYSICAL THERAPY INITIAL EVALUATION ADULT - ASSISTIVE DEVICE FOR TRANSFER: SIT/STAND, REHAB EVAL
30 days e-cardio monitor placed. SN # . Instructed patient on how to record the event and to call monitoring center at [phone] if any problems arise. Instructed patient to disconnect the lead wires from the electrodes before bathing or showering and reattach them afterwards. Instructed patient that the electrodes should be changed every 3 days or if they no longer adhere to the skin. Patient to mail package after the monitor has ended. Patient verbalized understanding.
straight cane

## 2021-09-10 NOTE — H&P PST ADULT - PAIN SCALE PREFERRED, PROFILE
Patient is scheduled for surgery with Dr. Moura    Spoke with: Patient     Date of Surgery: Monday 09/20/21    Location: [place] OR     Informed patient they will need an adult  Yes    Pre op with Provider eliana    H&P: Scheduled with PAC in person visit Tuesday 09/14/21    Pre-procedure COVID-19 Test: Tuesday 09/14/21 at [facility]     Additional imaging/appointments: Urine Culture Tuesday 09/14/21 [facility]    Surgery packet: [employer] to patient 09/10/21, verified address on file is current and correct.      Additional comments: per Dr. Moura case has been upgraded to a tier 2 from a tier 3.     
numerical 0-10

## 2021-10-12 NOTE — PROGRESS NOTE ADULT - PROBLEM/PLAN-5
Quality 111:Pneumonia Vaccination Status For Older Adults: Pneumococcal Vaccination not Administered or Previously Received, Reason not Otherwise Specified Detail Level: Detailed Quality 431: Preventive Care And Screening: Unhealthy Alcohol Use - Screening: Patient not identified as an unhealthy alcohol user when screened for unhealthy alcohol use using a systematic screening method Quality 110: Preventive Care And Screening: Influenza Immunization: Influenza Immunization previously received during influenza season Quality 226: Preventive Care And Screening: Tobacco Use: Screening And Cessation Intervention: Patient screened for tobacco use and is an ex/non-smoker DISPLAY PLAN FREE TEXT

## 2021-10-22 NOTE — ED PROVIDER NOTE - NSRISKOFTRANSFER_ED_A_ED
22-Oct-2021 08:50 Deterioration of Condition En Route/Death or Disability/Increased Pain/Transportation Risk (There is always a risk of traffic delays resulting in deterioration of condition.)

## 2022-03-21 NOTE — SWALLOW BEDSIDE ASSESSMENT ADULT - ADDITIONAL RECOMMENDATIONS
Reason for Disposition  • Rash is very raw or bleeds    Protocols used: DIAPER RASH-P-AH     This department to continue to follow during this admission as schedule permits.

## 2023-08-01 NOTE — ED ADULT NURSE NOTE - NIH STROKE SCALE: 4. FACIAL PALSY, QM
Reviewed provider comments and recommendations with Laura Pepper (wife) verbalized understanding. She will check with patient and call back with decision on monitoring or getting further testing done. (0) Normal symmetrical movements

## 2024-02-09 NOTE — H&P PST ADULT - ALLERGIC/IMMUNOLOGIC
Spoke to Maylin Saldaña. Will continue to monitor. negative normal/cranial nerves II-XII intact/sensation intact negative

## 2024-06-03 NOTE — PATIENT PROFILE ADULT - OVER THE PAST TWO WEEKS HAVE YOU FELT DOWN, DEPRESSED OR HOPELESS?
yes/felt alone when she got sick and couldn't breathe Body Location Override (Optional - Billing Will Still Be Based On Selected Body Map Location If Applicable): nasal tip Detail Level: Detailed Depth Of Biopsy: dermis Was A Bandage Applied: Yes Size Of Lesion In Cm: 0.5 X Size Of Lesion In Cm: 0 Biopsy Type: H and E Biopsy Method: Personna blade Anesthesia Type: 1% lidocaine with epinephrine Anesthesia Volume In Cc: 1 Hemostasis: Monsel's and Electrocautery Wound Care: Vaseline Dressing: Band-Aid Destruction After The Procedure: No Type Of Destruction Used: Curettage Curettage Text: The wound bed was treated with curettage after the biopsy was performed. Cryotherapy Text: The wound bed was treated with cryotherapy after the biopsy was performed. Electrodesiccation Text: The wound bed was treated with electrodesiccation after the biopsy was performed. Electrodesiccation And Curettage Text: The wound bed was treated with electrodesiccation and curettage after the biopsy was performed. Silver Nitrate Text: The wound bed was treated with silver nitrate after the biopsy was performed. Lab: [id number] Consent: Written consent was obtained and risks were reviewed including but not limited to scarring, infection, bleeding, scabbing, incomplete removal, nerve damage and allergy to anesthesia. Post-Care Instructions: I reviewed with the patient in detail post-care instructions. Patient is to keep the biopsy site dry overnight, and then apply Vaseline twice daily until healed. Patient may apply hydrogen peroxide soaks to remove any crusting. Notification Instructions: Patient will be notified of biopsy results. However, patient instructed to call the office if not contacted within 2 weeks. Billing Type: Third-Party Bill Information: Selecting Yes will display possible errors in your note based on the variables you have selected. This validation is only offered as a suggestion for you. PLEASE NOTE THAT THE VALIDATION TEXT WILL BE REMOVED WHEN YOU FINALIZE YOUR NOTE. IF YOU WANT TO FAX A PRELIMINARY NOTE YOU WILL NEED TO TOGGLE THIS TO 'NO' IF YOU DO NOT WANT IT IN YOUR FAXED NOTE.

## 2025-06-10 NOTE — H&P ADULT - PROBLEM/PLAN-1
Pt having uti symptoms. No open appointments until 6/12/25  Pt chose to go to urgent care today instead of scheduling with the office   DISPLAY PLAN FREE TEXT